# Patient Record
Sex: FEMALE | Race: ASIAN | Employment: OTHER | URBAN - METROPOLITAN AREA
[De-identification: names, ages, dates, MRNs, and addresses within clinical notes are randomized per-mention and may not be internally consistent; named-entity substitution may affect disease eponyms.]

---

## 2019-05-01 ENCOUNTER — APPOINTMENT (OUTPATIENT)
Dept: CT IMAGING | Age: 64
End: 2019-05-01
Attending: EMERGENCY MEDICINE
Payer: COMMERCIAL

## 2019-05-01 ENCOUNTER — HOSPITAL ENCOUNTER (EMERGENCY)
Age: 64
Discharge: HOME | End: 2019-05-01
Attending: EMERGENCY MEDICINE
Payer: COMMERCIAL

## 2019-05-01 VITALS
OXYGEN SATURATION: 90 % | SYSTOLIC BLOOD PRESSURE: 122 MMHG | TEMPERATURE: 98 F | RESPIRATION RATE: 20 BRPM | HEART RATE: 91 BPM | BODY MASS INDEX: 33.99 KG/M2 | HEIGHT: 61 IN | DIASTOLIC BLOOD PRESSURE: 74 MMHG | WEIGHT: 180 LBS

## 2019-05-01 DIAGNOSIS — R07.89 ATYPICAL CHEST PAIN: ICD-10-CM

## 2019-05-01 DIAGNOSIS — M54.16 LUMBAR RADICULOPATHY: Primary | ICD-10-CM

## 2019-05-01 LAB
ALBUMIN SERPL-MCNC: 3.9 G/DL (ref 3.4–5)
ALBUMIN/GLOB SERPL: 1.1 {RATIO} (ref 0.8–1.7)
ALP SERPL-CCNC: 90 U/L (ref 45–117)
ALT SERPL-CCNC: 26 U/L (ref 13–56)
ANION GAP SERPL CALC-SCNC: 9 MMOL/L (ref 3–18)
AST SERPL-CCNC: 18 U/L (ref 15–37)
BASOPHILS # BLD: 0.1 K/UL (ref 0–0.1)
BASOPHILS NFR BLD: 1 % (ref 0–2)
BILIRUB SERPL-MCNC: 0.3 MG/DL (ref 0.2–1)
BUN SERPL-MCNC: 21 MG/DL (ref 7–18)
BUN/CREAT SERPL: 23 (ref 12–20)
CALCIUM SERPL-MCNC: 9.2 MG/DL (ref 8.5–10.1)
CHLORIDE SERPL-SCNC: 105 MMOL/L (ref 100–108)
CK MB CFR SERPL CALC: 1.7 % (ref 0–4)
CK MB SERPL-MCNC: 1.8 NG/ML (ref 5–25)
CK SERPL-CCNC: 105 U/L (ref 26–192)
CO2 SERPL-SCNC: 25 MMOL/L (ref 21–32)
CREAT SERPL-MCNC: 0.91 MG/DL (ref 0.6–1.3)
DIFFERENTIAL METHOD BLD: ABNORMAL
EOSINOPHIL # BLD: 0.4 K/UL (ref 0–0.4)
EOSINOPHIL NFR BLD: 6 % (ref 0–5)
ERYTHROCYTE [DISTWIDTH] IN BLOOD BY AUTOMATED COUNT: 13.7 % (ref 11.6–14.5)
GLOBULIN SER CALC-MCNC: 3.4 G/DL (ref 2–4)
GLUCOSE SERPL-MCNC: 148 MG/DL (ref 74–99)
HCT VFR BLD AUTO: 39.4 % (ref 35–45)
HGB BLD-MCNC: 13.1 G/DL (ref 12–16)
LYMPHOCYTES # BLD: 1.6 K/UL (ref 0.9–3.6)
LYMPHOCYTES NFR BLD: 25 % (ref 21–52)
MCH RBC QN AUTO: 28 PG (ref 24–34)
MCHC RBC AUTO-ENTMCNC: 33.2 G/DL (ref 31–37)
MCV RBC AUTO: 84.2 FL (ref 74–97)
MONOCYTES # BLD: 0.4 K/UL (ref 0.05–1.2)
MONOCYTES NFR BLD: 7 % (ref 3–10)
NEUTS SEG # BLD: 3.9 K/UL (ref 1.8–8)
NEUTS SEG NFR BLD: 61 % (ref 40–73)
PLATELET # BLD AUTO: 329 K/UL (ref 135–420)
PMV BLD AUTO: 8.9 FL (ref 9.2–11.8)
POTASSIUM SERPL-SCNC: 4 MMOL/L (ref 3.5–5.5)
PROT SERPL-MCNC: 7.3 G/DL (ref 6.4–8.2)
RBC # BLD AUTO: 4.68 M/UL (ref 4.2–5.3)
SODIUM SERPL-SCNC: 139 MMOL/L (ref 136–145)
TROPONIN I SERPL-MCNC: <0.02 NG/ML (ref 0–0.04)
WBC # BLD AUTO: 6.3 K/UL (ref 4.6–13.2)

## 2019-05-01 PROCEDURE — 74011250636 HC RX REV CODE- 250/636: Performed by: EMERGENCY MEDICINE

## 2019-05-01 PROCEDURE — 85025 COMPLETE CBC W/AUTO DIFF WBC: CPT

## 2019-05-01 PROCEDURE — 93005 ELECTROCARDIOGRAM TRACING: CPT

## 2019-05-01 PROCEDURE — 99284 EMERGENCY DEPT VISIT MOD MDM: CPT

## 2019-05-01 PROCEDURE — 80053 COMPREHEN METABOLIC PANEL: CPT

## 2019-05-01 PROCEDURE — 96374 THER/PROPH/DIAG INJ IV PUSH: CPT

## 2019-05-01 PROCEDURE — 82550 ASSAY OF CK (CPK): CPT

## 2019-05-01 PROCEDURE — 72131 CT LUMBAR SPINE W/O DYE: CPT

## 2019-05-01 RX ORDER — KETOROLAC TROMETHAMINE 30 MG/ML
30 INJECTION, SOLUTION INTRAMUSCULAR; INTRAVENOUS
Status: COMPLETED | OUTPATIENT
Start: 2019-05-01 | End: 2019-05-01

## 2019-05-01 RX ORDER — CYCLOBENZAPRINE HCL 10 MG
10 TABLET ORAL
Qty: 20 TAB | Refills: 0 | Status: SHIPPED | OUTPATIENT
Start: 2019-05-01 | End: 2022-05-31

## 2019-05-01 RX ORDER — HYDROCODONE BITARTRATE AND ACETAMINOPHEN 7.5; 325 MG/1; MG/1
1 TABLET ORAL
Qty: 60 TAB | Refills: 0 | Status: SHIPPED | OUTPATIENT
Start: 2019-05-01 | End: 2019-05-16

## 2019-05-01 RX ADMIN — KETOROLAC TROMETHAMINE 30 MG: 30 INJECTION, SOLUTION INTRAMUSCULAR at 07:06

## 2019-05-01 NOTE — ED TRIAGE NOTES
Patient reports to ED c/c bilateral leg pain, reports pain radiates from knees to feet. Patient reports being seen by Dr. Juan Hernadez, reports medication is not working. Unable to recall name of pain medication, reports taking Vistaril to help with sleep but it didn't work.

## 2019-05-01 NOTE — DISCHARGE INSTRUCTIONS
Patient Education        Back Pain: Care Instructions  Your Care Instructions    Back pain has many possible causes. It is often related to problems with muscles and ligaments of the back. It may also be related to problems with the nerves, discs, or bones of the back. Moving, lifting, standing, sitting, or sleeping in an awkward way can strain the back. Sometimes you don't notice the injury until later. Arthritis is another common cause of back pain. Although it may hurt a lot, back pain usually improves on its own within several weeks. Most people recover in 12 weeks or less. Using good home treatment and being careful not to stress your back can help you feel better sooner. Follow-up care is a key part of your treatment and safety. Be sure to make and go to all appointments, and call your doctor if you are having problems. It's also a good idea to know your test results and keep a list of the medicines you take. How can you care for yourself at home? · Sit or lie in positions that are most comfortable and reduce your pain. Try one of these positions when you lie down:  ? Lie on your back with your knees bent and supported by large pillows. ? Lie on the floor with your legs on the seat of a sofa or chair. ? Lie on your side with your knees and hips bent and a pillow between your legs. ? Lie on your stomach if it does not make pain worse. · Do not sit up in bed, and avoid soft couches and twisted positions. Bed rest can help relieve pain at first, but it delays healing. Avoid bed rest after the first day of back pain. · Change positions every 30 minutes. If you must sit for long periods of time, take breaks from sitting. Get up and walk around, or lie in a comfortable position. · Try using a heating pad on a low or medium setting for 15 to 20 minutes every 2 or 3 hours. Try a warm shower in place of one session with the heating pad. · You can also try an ice pack for 10 to 15 minutes every 2 to 3 hours. Put a thin cloth between the ice pack and your skin. · Take pain medicines exactly as directed. ? If the doctor gave you a prescription medicine for pain, take it as prescribed. ? If you are not taking a prescription pain medicine, ask your doctor if you can take an over-the-counter medicine. · Take short walks several times a day. You can start with 5 to 10 minutes, 3 or 4 times a day, and work up to longer walks. Walk on level surfaces and avoid hills and stairs until your back is better. · Return to work and other activities as soon as you can. Continued rest without activity is usually not good for your back. · To prevent future back pain, do exercises to stretch and strengthen your back and stomach. Learn how to use good posture, safe lifting techniques, and proper body mechanics. When should you call for help? Call your doctor now or seek immediate medical care if:    · You have new or worsening numbness in your legs.     · You have new or worsening weakness in your legs. (This could make it hard to stand up.)     · You lose control of your bladder or bowels.    Watch closely for changes in your health, and be sure to contact your doctor if:    · You have a fever, lose weight, or don't feel well.     · You do not get better as expected. Where can you learn more? Go to http://kelsey-lexus.info/. Enter O250 in the search box to learn more about \"Back Pain: Care Instructions. \"  Current as of: September 20, 2018  Content Version: 11.9  © 8424-7285 [employer]. Care instructions adapted under license by [employer] (which disclaims liability or warranty for this information). If you have questions about a medical condition or this instruction, always ask your healthcare professional. [place] [address] any warranty or liability for your use of this information.

## 2019-05-01 NOTE — ED PROVIDER NOTES
EMERGENCY DEPARTMENT HISTORY AND PHYSICAL EXAM    Date: 5/1/2019  Patient Name: Zoë Hernandez    History of Presenting Illness     Chief Complaint   Patient presents with    Leg Pain         HPI:   6:52 AM  Zoë Hernandez is a 61 y.o. female with PMHX of diabetes, hypertension, asthma, coronary artery disease and she states she has a stent put in by Dr. Susy Farley 2 years ago who presents to the emergency department C/O multiple complaints but mostly leg pain bilaterally. Also complains of chest pain and wants his stents checked out. Patient states she has had long history of low back pain and leg pain and is treated for restless leg syndrome by her PCP. She states she has been on tramadol but this was changed   to gabapentin which she states is not working low back pain and leg pain began worsening about a month or 2 ago. Patient states she is now unable to sleep because of the pain. She states last night she began having chest pain as well. She points to the lower sternum area for hip pain which she says is localized and some shortness of breath. At present leg pain is worse and she rates 10 out of 10, and chest pain she rates a 6 out of 10. PCP: Tamar Panchal MD    Current Outpatient Medications   Medication Sig Dispense Refill    HYDROcodone-acetaminophen (NORCO) 7.5-325 mg per tablet Take 1 Tab by mouth every six (6) hours as needed for Pain for up to 15 days. Max Daily Amount: 4 Tabs. 60 Tab 0    cyclobenzaprine (FLEXERIL) 10 mg tablet Take 1 Tab by mouth three (3) times daily as needed for Muscle Spasm(s). 20 Tab 0    aspirin delayed-release 81 mg tablet Take 1 Tab by mouth daily. 30 Tab 0    ticagrelor (BRILINTA) 90 mg tablet Take 1 Tab by mouth two (2) times a day. 60 Tab 0    metFORMIN (GLUCOPHAGE) 500 mg tablet Take 500 mg by mouth daily (with breakfast).  lisinopril-hydrochlorothiazide (PRINZIDE, ZESTORETIC) 10-12.5 mg per tablet Take 1 Tab by mouth daily.       PARoxetine (PAXIL) 40 mg tablet Take 40 mg by mouth every evening.  cycloSPORINE (RESTASIS) 0.05 % ophthalmic emulsion Administer 1 Drop to both eyes every twelve (12) hours.  budesonide-formoterol (SYMBICORT) 80-4.5 mcg/actuation HFAA inhaler Take 2 Puffs by inhalation two (2) times a day.  albuterol (PROVENTIL HFA, VENTOLIN HFA, PROAIR HFA) 90 mcg/actuation inhaler Take 2 Puffs by inhalation every four (4) hours as needed for Wheezing.  vitamin E (AQUA GEMS) 400 unit capsule Take 400 Units by mouth daily.  vit a,c & e-lutein-minerals (OCUVITE) tablet 1 Tab daily.  potassium 99 mg tablet Take 99 mg by mouth daily. Past History     Past Medical History:  Past Medical History:   Diagnosis Date    Asthma     Diabetes ([facility] [address]     Hypertension     Psychiatric disorder     depression       Past Surgical History:  Past Surgical History:   Procedure Laterality Date    HX ORTHOPAEDIC      neck surgery    HX UROLOGICAL      bladder surgery       Family History:  History reviewed. No pertinent family history. Social History:  Social History     Tobacco Use    Smoking status: Never Smoker   Substance Use Topics    Alcohol use: No    Drug use: Not on file       Allergies: Allergies   Allergen Reactions    Statins-Hmg-Coa Reductase Inhibitors Other (comments)         Review of Systems   Review of Systems   Constitutional: Negative for chills and fever. HENT: Negative for congestion and sore throat. Respiratory: Negative for cough and shortness of breath. Cardiovascular: Positive for chest pain and palpitations. Gastrointestinal: Negative for abdominal distention, abdominal pain, nausea and vomiting. Genitourinary: Negative for difficulty urinating, dysuria, flank pain, frequency, hematuria, urgency, vaginal bleeding and vaginal discharge. Musculoskeletal: Positive for back pain. Negative for arthralgias and joint swelling. Leg pain   Skin: Negative for rash and wound.    Neurological: Negative for dizziness, weakness, light-headedness and headaches. Hematological: Negative for adenopathy. Physical Exam     Vitals:    05/01/19 0554 05/01/19 0819   BP: 119/70 122/74   Pulse: 98 91   Resp: 16 20   Temp: 98 °F (36.7 °C)    SpO2: 90%    Weight: 81.6 kg (180 lb)    Height: 5' 1\" (1.549 m)      Physical Exam   Constitutional: She is oriented to person, place, and time. She appears well-developed and well-nourished. No distress. Anxious female in no acute distress. HENT:   Head: Normocephalic and atraumatic. Right Ear: External ear normal. No swelling or tenderness. Tympanic membrane is not perforated, not erythematous and not bulging. Left Ear: External ear normal. No swelling or tenderness. Tympanic membrane is not perforated, not erythematous and not bulging. Nose: Nose normal. No mucosal edema or rhinorrhea. Right sinus exhibits no maxillary sinus tenderness and no frontal sinus tenderness. Left sinus exhibits no maxillary sinus tenderness and no frontal sinus tenderness. Mouth/Throat: Uvula is midline, oropharynx is clear and moist and mucous membranes are normal. No oral lesions. No trismus in the jaw. No dental abscesses or uvula swelling. No oropharyngeal exudate, posterior oropharyngeal edema, posterior oropharyngeal erythema or tonsillar abscesses. Eyes: Conjunctivae are normal. Right eye exhibits no discharge. Left eye exhibits no discharge. No scleral icterus. Neck: Normal range of motion. Neck supple. Cardiovascular: Normal rate, regular rhythm, normal heart sounds and intact distal pulses. Exam reveals no gallop and no friction rub. No murmur heard. Pulmonary/Chest: Effort normal and breath sounds normal. No accessory muscle usage. No tachypnea. No respiratory distress. She has no decreased breath sounds. She has no wheezes. She has no rhonchi. She has no rales. Abdominal: Soft. Musculoskeletal: Normal range of motion. She exhibits no edema or tenderness. There is some mild tenderness to the lumbar area at L5-S1 level. Straight leg raising is normal.   Lymphadenopathy:     She has no cervical adenopathy. Neurological: She is alert and oriented to person, place, and time. Skin: Skin is warm and dry. No rash noted. She is not diaphoretic. No erythema. Psychiatric: She has a normal mood and affect. Judgment normal.   Nursing note and vitals reviewed. Diagnostic Study Results     Labs -   No results found for this or any previous visit (from the past 12 hour(s)). Radiologic Studies -   CT SPINE LUMB WO CONT   Final Result   Impression:      1. Multilevel degenerative disc disease, most prominent at L3-L4 and L4-L5 where   there is moderate multifactorial canal stenosis. High-grade right lateral recess   effacement at L4-L5 with abutment of the descending right L5 nerve root. 2. Advanced lower lumbar facet arthropathy causes impingement of the right L4   and right L5 foraminal nerve roots. There is abutment but no definite   deformation of the left L4 foraminal nerve root. 3. No acute fracture or subluxation. 4. Only partially visualized are calcifications near the right kidney upper   pole. Indeterminant to what extent these are associated with the cortex or   adjacent vasculature. This does not appear to represent nephrolithiasis and   there is no acute obstructive uropathy. CT Results  (Last 48 hours)    None        CXR Results  (Last 48 hours)    None          Medications given in the ED-  Medications   ketorolac (TORADOL) injection 30 mg (30 mg IntraVENous Given 5/1/19 0706)         Medical Decision Making   I am the first provider for this patient. I reviewed the vital signs, available nursing notes, past medical history, past surgical history, family history and social history. Vital Signs-Reviewed the patient's vital signs.     Pulse Oximetry Analysis - 90% on RA     Cardiac Monitor:  Rate: 91 bpm  Rhythm: Sinus    EKG interpretation: (Preliminary)  6:52 AM   NSR with rate 94, Nl QRS      Records Reviewed: Nursing Notes and Old Medical Records    Provider Notes (Medical Decision Making):     Procedures:  Procedures    ED Course:   6:52 AM Initial assessment performed. The patients presenting problems have been discussed, and they are in agreement with the care plan formulated and outlined with them. I have encouraged them to ask questions as they arise throughout their visit. Diagnosis and Disposition       DISCHARGE NOTE:  08:09  Alber Pardo's  results have been reviewed with her. She has been counseled regarding her diagnosis, treatment, and plan. She verbally conveys understanding and agreement of the signs, symptoms, diagnosis, treatment and prognosis and additionally agrees to follow up as discussed. She also agrees with the care-plan and conveys that all of her questions have been answered. I have also provided discharge instructions for her that include: educational information regarding their diagnosis and treatment, and list of reasons why they would want to return to the ED prior to their follow-up appointment, should her condition change. She has been provided with education for proper emergency department utilization. CLINICAL IMPRESSION:    1. Lumbar radiculopathy    2. Atypical chest pain        PLAN:  1. D/C Home  2. Discharge Medication List as of 5/1/2019  8:10 AM      START taking these medications    Details   HYDROcodone-acetaminophen (NORCO) 7.5-325 mg per tablet Take 1 Tab by mouth every six (6) hours as needed for Pain for up to 15 days. Max Daily Amount: 4 Tabs., Print, Disp-60 Tab, R-0      cyclobenzaprine (FLEXERIL) 10 mg tablet Take 1 Tab by mouth three (3) times daily as needed for Muscle Spasm(s). , Print, Disp-20 Tab, R-0         CONTINUE these medications which have NOT CHANGED    Details   aspirin delayed-release 81 mg tablet Take 1 Tab by mouth daily. , Print, Disp-30 Tab, R-0 ticagrelor (BRILINTA) 90 mg tablet Take 1 Tab by mouth two (2) times a day., Print, Disp-60 Tab, R-0      metFORMIN (GLUCOPHAGE) 500 mg tablet Take 500 mg by mouth daily (with breakfast). , Historical Med      lisinopril-hydrochlorothiazide (PRINZIDE, ZESTORETIC) 10-12.5 mg per tablet Take 1 Tab by mouth daily. , Historical Med      PARoxetine (PAXIL) 40 mg tablet Take 40 mg by mouth every evening., Historical Med      cycloSPORINE (RESTASIS) 0.05 % ophthalmic emulsion Administer 1 Drop to both eyes every twelve (12) hours. , Historical Med      budesonide-formoterol (SYMBICORT) 80-4.5 mcg/actuation HFAA inhaler Take 2 Puffs by inhalation two (2) times a day., Historical Med      albuterol (PROVENTIL HFA, VENTOLIN HFA, PROAIR HFA) 90 mcg/actuation inhaler Take 2 Puffs by inhalation every four (4) hours as needed for Wheezing., Historical Med      vitamin E (AQUA GEMS) 400 unit capsule Take 400 Units by mouth daily. , Historical Med      vit a,c & e-lutein-minerals (OCUVITE) tablet 1 Tab daily. , Historical Med      potassium 99 mg tablet Take 99 mg by mouth daily. , Historical Med           3.    Follow-up Information     Follow up With Specialties Details Why Contact Info    Tomas Sanderson MD Cardiology, Internal Medicine In 1 day  [address]  [address] [facility]      Giancarlo Stubbs MD Orthopedic Surgery In 1 day  [address] [address]  [phone]      Other, MD Tamar  In 1 day  Patient can only remember the practice name and not the physician      [facility] Emergency Medicine  As needed [address] [address] [phone]

## 2019-05-06 ENCOUNTER — HOSPITAL ENCOUNTER (OUTPATIENT)
Dept: MRI IMAGING | Age: 64
Discharge: HOME | End: 2019-05-06
Attending: ORTHOPAEDIC SURGERY
Payer: COMMERCIAL

## 2019-05-06 DIAGNOSIS — M54.50 LOW BACK PAIN: ICD-10-CM

## 2019-05-06 PROCEDURE — 72148 MRI LUMBAR SPINE W/O DYE: CPT

## 2019-05-11 LAB
ATRIAL RATE: 94 BPM
CALCULATED P AXIS, ECG09: 42 DEGREES
CALCULATED R AXIS, ECG10: 42 DEGREES
CALCULATED T AXIS, ECG11: 36 DEGREES
DIAGNOSIS, 93000: NORMAL
P-R INTERVAL, ECG05: 162 MS
Q-T INTERVAL, ECG07: 380 MS
QRS DURATION, ECG06: 86 MS
QTC CALCULATION (BEZET), ECG08: 475 MS
VENTRICULAR RATE, ECG03: 94 BPM

## 2019-07-23 ENCOUNTER — HOSPITAL ENCOUNTER (OUTPATIENT)
Dept: MRI IMAGING | Age: 64
Discharge: HOME | End: 2019-07-23
Attending: ORTHOPAEDIC SURGERY
Payer: COMMERCIAL

## 2019-07-23 DIAGNOSIS — M25.562 LEFT KNEE PAIN: ICD-10-CM

## 2019-07-23 DIAGNOSIS — M25.561 KNEE PAIN, RIGHT: ICD-10-CM

## 2019-07-23 PROCEDURE — 73721 MRI JNT OF LWR EXTRE W/O DYE: CPT

## 2020-12-30 ENCOUNTER — HOSPITAL ENCOUNTER (EMERGENCY)
Age: 65
Discharge: HOME | End: 2020-12-30
Attending: EMERGENCY MEDICINE
Payer: MEDICARE

## 2020-12-30 VITALS
TEMPERATURE: 97.3 F | DIASTOLIC BLOOD PRESSURE: 63 MMHG | SYSTOLIC BLOOD PRESSURE: 130 MMHG | HEIGHT: 61 IN | HEART RATE: 100 BPM | OXYGEN SATURATION: 97 % | BODY MASS INDEX: 33.99 KG/M2 | RESPIRATION RATE: 18 BRPM | WEIGHT: 180 LBS

## 2020-12-30 DIAGNOSIS — K59.00 CONSTIPATION, UNSPECIFIED CONSTIPATION TYPE: ICD-10-CM

## 2020-12-30 DIAGNOSIS — G25.81 RLS (RESTLESS LEGS SYNDROME): Primary | ICD-10-CM

## 2020-12-30 PROCEDURE — 74011250637 HC RX REV CODE- 250/637: Performed by: EMERGENCY MEDICINE

## 2020-12-30 PROCEDURE — 99284 EMERGENCY DEPT VISIT MOD MDM: CPT

## 2020-12-30 RX ORDER — BROMPHENIRAMINE MALEATE, DEXTROMETHORPHAN HBR, PHENYLEPHRINE HCL, DIPHENHYDRAMINE HCL, PHENYLEPHRINE HCL 0.52G
1 KIT ORAL DAILY
Qty: 30 CAP | Refills: 0 | Status: SHIPPED | OUTPATIENT
Start: 2020-12-30 | End: 2021-01-29

## 2020-12-30 RX ORDER — DOCUSATE SODIUM 100 MG/1
100 CAPSULE, LIQUID FILLED ORAL 2 TIMES DAILY
Qty: 60 CAP | Refills: 0 | Status: SHIPPED | OUTPATIENT
Start: 2020-12-30 | End: 2021-01-29

## 2020-12-30 RX ORDER — ROPINIROLE 5 MG/1
5 TABLET, FILM COATED ORAL
Qty: 20 TAB | Refills: 0 | Status: SHIPPED | OUTPATIENT
Start: 2020-12-30 | End: 2022-05-24

## 2020-12-30 RX ORDER — ROPINIROLE 1 MG/1
5 TABLET, FILM COATED ORAL ONCE
Status: COMPLETED | OUTPATIENT
Start: 2020-12-30 | End: 2020-12-30

## 2020-12-30 RX ORDER — POLYETHYLENE GLYCOL 3350 17 G/17G
17 POWDER, FOR SOLUTION ORAL DAILY
Qty: 300 G | Refills: 0 | Status: SHIPPED | OUTPATIENT
Start: 2020-12-30

## 2020-12-30 RX ADMIN — ROPINIROLE HYDROCHLORIDE 5 MG: 1 TABLET, FILM COATED ORAL at 05:02

## 2020-12-30 NOTE — ED PROVIDER NOTES
EMERGENCY DEPARTMENT HISTORY AND PHYSICAL EXAM    Date: 12/30/2020  Patient Name: Vilma Mcgill    History of Presenting Illness     Chief Complaint   Patient presents with    Insomnia    Restless Leg Syndrome    Medication Refill         History Provided By: Patient    Mohan Velazquez is a 72 y.o. female with PMHX of restless leg syndrome, diabetes who presents to the emergency department C/O restless legs. Patient reports that she is out of her ropinirole which she has been taking for over 2 years for restless leg syndrome. She tells me that her doctor prescribed her clonazepam to help her sleep but did not prescribe her ropinirole. Tells me she thinks this is because he did not want her to be taking both at the same time. She reports feeling restless legs flaring up and not being able to sleep for the past 2 days. Patient also reports constipation and feeling bloated. PCP: Tamar Panchal MD    Current Facility-Administered Medications   Medication Dose Route Frequency Provider Last Rate Last Admin    rOPINIRole (REQUIP) tablet 5 mg  5 mg Oral ONCE Mikey Acuña MD         Current Outpatient Medications   Medication Sig Dispense Refill    rOPINIRole (Requip) 5 mg tablet Take 1 Tab by mouth nightly. 20 Tab 0    polyethylene glycol (Miralax) 17 gram/dose powder Take 17 g by mouth daily. 1 tablespoon with 8 oz of water daily 300 g 0    docusate sodium (COLACE) 100 mg capsule Take 1 Cap by mouth two (2) times a day for 30 days. 60 Cap 0    psyllium (Metamucil) 0.52 gram capsule Take 1 Cap by mouth daily for 30 days. 30 Cap 0    cyclobenzaprine (FLEXERIL) 10 mg tablet Take 1 Tab by mouth three (3) times daily as needed for Muscle Spasm(s). 20 Tab 0    aspirin delayed-release 81 mg tablet Take 1 Tab by mouth daily. 30 Tab 0    ticagrelor (BRILINTA) 90 mg tablet Take 1 Tab by mouth two (2) times a day. 60 Tab 0    metFORMIN (GLUCOPHAGE) 500 mg tablet Take 500 mg by mouth daily (with breakfast).  lisinopril-hydrochlorothiazide (PRINZIDE, ZESTORETIC) 10-12.5 mg per tablet Take 1 Tab by mouth daily.  PARoxetine (PAXIL) 40 mg tablet Take 40 mg by mouth every evening.  cycloSPORINE (RESTASIS) 0.05 % ophthalmic emulsion Administer 1 Drop to both eyes every twelve (12) hours.  budesonide-formoterol (SYMBICORT) 80-4.5 mcg/actuation HFAA inhaler Take 2 Puffs by inhalation two (2) times a day.  albuterol (PROVENTIL HFA, VENTOLIN HFA, PROAIR HFA) 90 mcg/actuation inhaler Take 2 Puffs by inhalation every four (4) hours as needed for Wheezing.  vitamin E (AQUA GEMS) 400 unit capsule Take 400 Units by mouth daily.  vit a,c & e-lutein-minerals (OCUVITE) tablet 1 Tab daily.  potassium 99 mg tablet Take 99 mg by mouth daily. Past History     Past Medical History:  Past Medical History:   Diagnosis Date    Asthma     Diabetes ([facility] [address]     Hypertension     Psychiatric disorder     depression       Past Surgical History:  Past Surgical History:   Procedure Laterality Date    HX ORTHOPAEDIC      neck surgery    HX UROLOGICAL      bladder surgery       Family History:  History reviewed. No pertinent family history. Social History:  Social History     Tobacco Use    Smoking status: Never Smoker   Substance Use Topics    Alcohol use: No    Drug use: Never       Allergies: Allergies   Allergen Reactions    Statins-Hmg-Coa Reductase Inhibitors Other (comments)         Review of Systems   Review of Systems   Constitutional: Negative for chills and fever. Respiratory: Negative for shortness of breath. Cardiovascular: Negative for chest pain. Gastrointestinal: Positive for constipation. Musculoskeletal: Positive for myalgias. All other systems reviewed and are negative.         Physical Exam     Vitals:    12/30/20 0430   BP: 130/63   Pulse: 100   Resp: 18   Temp: 97.3 °F (36.3 °C)   SpO2: 97%   Weight: 81.6 kg (180 lb)   Height: 5' 1\" (1.549 m)     Physical Exam  Vitals signs and nursing note reviewed. Constitutional:       General: She is not in acute distress. Appearance: Normal appearance. She is not ill-appearing. HENT:      Head: Normocephalic and atraumatic. Eyes:      Extraocular Movements: Extraocular movements intact. Conjunctiva/sclera: Conjunctivae normal.   Neck:      Musculoskeletal: Normal range of motion. Cardiovascular:      Rate and Rhythm: Normal rate and regular rhythm. Pulmonary:      Effort: Pulmonary effort is normal. No respiratory distress. Abdominal:      General: There is no distension. Palpations: Abdomen is soft. Tenderness: There is no abdominal tenderness. Musculoskeletal: Normal range of motion. General: No deformity. Right lower leg: No edema. Left lower leg: No edema. Neurological:      General: No focal deficit present. Mental Status: She is alert and oriented to person, place, and time. Mental status is at baseline. Comments: Akthesia, lower extremities    Psychiatric:         Mood and Affect: Mood normal.         Behavior: Behavior normal.      Comments: Restless, pacing room               Diagnostic Study Results     Labs -   No results found for this or any previous visit (from the past 12 hour(s)). Radiologic Studies -   No orders to display     CT Results  (Last 48 hours)    None        CXR Results  (Last 48 hours)    None          Medications given in the ED-  Medications   rOPINIRole (REQUIP) tablet 5 mg (has no administration in time range)         Medical Decision Making   I am the first provider for this patient. I reviewed the vital signs, available nursing notes, past medical history, past surgical history, family history and social history. Vital Signs-Reviewed the patient's vital signs.     Pulse Oximetry Analysis and Interpretation:   97% on RA, normal        Records Reviewed: Nursing Notes    Provider Notes (Medical Decision Making): Joao Claudio is a 72 y.o. female here for essentially medication refill not be able to sleep as she has not had her ropinirole and has had difficulty getting in contact with her primary care physician. Patient very restless and has been unable to sleep because of this. She tells me his medication has been working well for her for the past 2 years. Will give dose in emergency department and also prescribe short course until she can be seen by her primary care doctor. She is also reporting constipation. This is an ongoing issue. Will prescribed bowel regimen as well. Procedures:  Procedures    ED Course:       Diagnosis and Disposition     Critical Care:     DISCHARGE NOTE:    Alber Pardo's  results have been reviewed with her. She has been counseled regarding her diagnosis, treatment, and plan. She verbally conveys understanding and agreement of the signs, symptoms, diagnosis, treatment and prognosis and additionally agrees to follow up as discussed. She also agrees with the care-plan and conveys that all of her questions have been answered. I have also provided discharge instructions for her that include: educational information regarding their diagnosis and treatment, and list of reasons why they would want to return to the ED prior to their follow-up appointment, should her condition change. She has been provided with education for proper emergency department utilization. CLINICAL IMPRESSION:    1. RLS (restless legs syndrome)    2. Constipation, unspecified constipation type        PLAN:  1. D/C Home  2. Current Discharge Medication List      START taking these medications    Details   rOPINIRole (Requip) 5 mg tablet Take 1 Tab by mouth nightly. Qty: 20 Tab, Refills: 0      polyethylene glycol (Miralax) 17 gram/dose powder Take 17 g by mouth daily.  1 tablespoon with 8 oz of water daily  Qty: 300 g, Refills: 0      docusate sodium (COLACE) 100 mg capsule Take 1 Cap by mouth two (2) times a day for 30 days.  Qty: 60 Cap, Refills: 0      psyllium (Metamucil) 0.52 gram capsule Take 1 Cap by mouth daily for 30 days. Qty: 30 Cap, Refills: 0           3. Follow-up Information     Follow up With Specialties Details Why Contact Info    Mallie Dakins, MD Family Medicine Schedule an appointment as soon as possible for a visit  For primary care follow up [address] [place] [address]  [phone]          _______________________________      Please note that this dictation was completed with [employer], the computer voice recognition software. Quite often unanticipated grammatical, syntax, homophones, and other interpretive errors are inadvertently transcribed by the computer software. Please disregard these errors. Please excuse any errors that have escaped final proofreading.

## 2021-03-22 ENCOUNTER — HOSPITAL ENCOUNTER (EMERGENCY)
Age: 66
Discharge: HOME | End: 2021-03-22
Attending: EMERGENCY MEDICINE
Payer: MEDICARE

## 2021-03-22 ENCOUNTER — APPOINTMENT (OUTPATIENT)
Dept: GENERAL RADIOLOGY | Age: 66
End: 2021-03-22
Attending: PHYSICIAN ASSISTANT
Payer: MEDICARE

## 2021-03-22 VITALS
SYSTOLIC BLOOD PRESSURE: 127 MMHG | HEIGHT: 61 IN | TEMPERATURE: 97.6 F | DIASTOLIC BLOOD PRESSURE: 71 MMHG | HEART RATE: 99 BPM | RESPIRATION RATE: 20 BRPM | OXYGEN SATURATION: 93 % | BODY MASS INDEX: 32.1 KG/M2 | WEIGHT: 170 LBS

## 2021-03-22 DIAGNOSIS — R07.9 CHEST PAIN, UNSPECIFIED TYPE: Primary | ICD-10-CM

## 2021-03-22 LAB
ALBUMIN SERPL-MCNC: 4 G/DL (ref 3.4–5)
ALBUMIN/GLOB SERPL: 1.2 {RATIO} (ref 0.8–1.7)
ALP SERPL-CCNC: 87 U/L (ref 45–117)
ALT SERPL-CCNC: 25 U/L (ref 13–56)
ANION GAP SERPL CALC-SCNC: 6 MMOL/L (ref 3–18)
APPEARANCE UR: CLEAR
APTT PPP: 26.4 SEC (ref 23–36.4)
AST SERPL-CCNC: 15 U/L (ref 10–38)
BASOPHILS # BLD: 0 K/UL (ref 0–0.1)
BASOPHILS NFR BLD: 0 % (ref 0–2)
BILIRUB SERPL-MCNC: 0.5 MG/DL (ref 0.2–1)
BILIRUB UR QL: NEGATIVE
BNP SERPL-MCNC: 14 PG/ML (ref 0–900)
BUN SERPL-MCNC: 12 MG/DL (ref 7–18)
BUN/CREAT SERPL: 17 (ref 12–20)
CALCIUM SERPL-MCNC: 9.4 MG/DL (ref 8.5–10.1)
CHLORIDE SERPL-SCNC: 101 MMOL/L (ref 100–111)
CK MB CFR SERPL CALC: 1.5 % (ref 0–4)
CK MB CFR SERPL CALC: 1.9 % (ref 0–4)
CK MB SERPL-MCNC: 1 NG/ML (ref 5–25)
CK MB SERPL-MCNC: 1.1 NG/ML (ref 5–25)
CK SERPL-CCNC: 58 U/L (ref 26–192)
CK SERPL-CCNC: 66 U/L (ref 26–192)
CO2 SERPL-SCNC: 31 MMOL/L (ref 21–32)
COLOR UR: YELLOW
CREAT SERPL-MCNC: 0.7 MG/DL (ref 0.6–1.3)
D DIMER PPP FEU-MCNC: <0.27 UG/ML(FEU)
DIFFERENTIAL METHOD BLD: NORMAL
EOSINOPHIL # BLD: 0.1 K/UL (ref 0–0.4)
EOSINOPHIL NFR BLD: 2 % (ref 0–5)
ERYTHROCYTE [DISTWIDTH] IN BLOOD BY AUTOMATED COUNT: 13.8 % (ref 11.6–14.5)
GLOBULIN SER CALC-MCNC: 3.4 G/DL (ref 2–4)
GLUCOSE SERPL-MCNC: 115 MG/DL (ref 74–99)
GLUCOSE UR STRIP.AUTO-MCNC: NEGATIVE MG/DL
HCT VFR BLD AUTO: 41.1 % (ref 35–45)
HGB BLD-MCNC: 13 G/DL (ref 12–16)
HGB UR QL STRIP: NEGATIVE
INR PPP: 0.9 (ref 0.8–1.2)
KETONES UR QL STRIP.AUTO: NEGATIVE MG/DL
LEUKOCYTE ESTERASE UR QL STRIP.AUTO: NEGATIVE
LIPASE SERPL-CCNC: 146 U/L (ref 73–393)
LYMPHOCYTES # BLD: 1.4 K/UL (ref 0.9–3.6)
LYMPHOCYTES NFR BLD: 23 % (ref 21–52)
MCH RBC QN AUTO: 27.5 PG (ref 24–34)
MCHC RBC AUTO-ENTMCNC: 31.6 G/DL (ref 31–37)
MCV RBC AUTO: 86.9 FL (ref 74–97)
MONOCYTES # BLD: 0.3 K/UL (ref 0.05–1.2)
MONOCYTES NFR BLD: 5 % (ref 3–10)
NEUTS SEG # BLD: 4.2 K/UL (ref 1.8–8)
NEUTS SEG NFR BLD: 70 % (ref 40–73)
NITRITE UR QL STRIP.AUTO: NEGATIVE
PH UR STRIP: 7 [PH] (ref 5–8)
PLATELET # BLD AUTO: 308 K/UL (ref 135–420)
PMV BLD AUTO: 9.2 FL (ref 9.2–11.8)
POTASSIUM SERPL-SCNC: 3.8 MMOL/L (ref 3.5–5.5)
PROT SERPL-MCNC: 7.4 G/DL (ref 6.4–8.2)
PROT UR STRIP-MCNC: NEGATIVE MG/DL
PROTHROMBIN TIME: 12.2 SEC (ref 11.5–15.2)
RBC # BLD AUTO: 4.73 M/UL (ref 4.2–5.3)
SODIUM SERPL-SCNC: 138 MMOL/L (ref 136–145)
SP GR UR REFRACTOMETRY: <1.005 (ref 1–1.03)
TROPONIN I SERPL-MCNC: <0.02 NG/ML (ref 0–0.04)
TROPONIN I SERPL-MCNC: <0.02 NG/ML (ref 0–0.04)
UROBILINOGEN UR QL STRIP.AUTO: 0.2 EU/DL (ref 0.2–1)
WBC # BLD AUTO: 6.1 K/UL (ref 4.6–13.2)

## 2021-03-22 PROCEDURE — 85730 THROMBOPLASTIN TIME PARTIAL: CPT

## 2021-03-22 PROCEDURE — 83690 ASSAY OF LIPASE: CPT

## 2021-03-22 PROCEDURE — 99285 EMERGENCY DEPT VISIT HI MDM: CPT

## 2021-03-22 PROCEDURE — 80053 COMPREHEN METABOLIC PANEL: CPT

## 2021-03-22 PROCEDURE — 74011250637 HC RX REV CODE- 250/637: Performed by: PHYSICIAN ASSISTANT

## 2021-03-22 PROCEDURE — 81003 URINALYSIS AUTO W/O SCOPE: CPT

## 2021-03-22 PROCEDURE — 83880 ASSAY OF NATRIURETIC PEPTIDE: CPT

## 2021-03-22 PROCEDURE — 93005 ELECTROCARDIOGRAM TRACING: CPT

## 2021-03-22 PROCEDURE — 85379 FIBRIN DEGRADATION QUANT: CPT

## 2021-03-22 PROCEDURE — 85610 PROTHROMBIN TIME: CPT

## 2021-03-22 PROCEDURE — 85025 COMPLETE CBC W/AUTO DIFF WBC: CPT

## 2021-03-22 PROCEDURE — 82553 CREATINE MB FRACTION: CPT

## 2021-03-22 PROCEDURE — 71045 X-RAY EXAM CHEST 1 VIEW: CPT

## 2021-03-22 RX ORDER — GUAIFENESIN 100 MG/5ML
244 LIQUID (ML) ORAL
Status: COMPLETED | OUTPATIENT
Start: 2021-03-22 | End: 2021-03-22

## 2021-03-22 RX ADMIN — ASPIRIN 243 MG: 81 TABLET, CHEWABLE ORAL at 13:55

## 2021-03-22 RX ADMIN — NITROGLYCERIN 1 INCH: 20 OINTMENT TOPICAL at 13:56

## 2021-03-22 NOTE — ED PROVIDER NOTES
EMERGENCY DEPARTMENT HISTORY AND PHYSICAL EXAM    Date: 3/22/2021  Patient Name: Francia Solomon    History of Presenting Illness     Chief Complaint   Patient presents with    Shortness of Breath    Chest Pain         History Provided By: Patient    Chief Complaint: CP, SOB    HPI(Context):   1:02 PM  Francia Solomon is a 72 y.o. female with PMHX of CAD with stents x 2, HTN, DMII, asthma who presents to the emergency department C/O chest pain. Associated sxs include SOB. Sxs intermittent since last night. Reports 4/10 currently. Sxs are not exertional. Sxs described as pressure on across chest. No radiation of sxs. Pt reports left shoulder pain 2 days ago but since resolved. Pt followed by Nikole Cesar MD (cardiology). Pt was taken off Brilinta. Now takes 81 mg ASA with first dose this AM.  Pt is nonsmoker. Pt denies fever, chills, cough, wheezing, diaphoresis, nausea, vomiting, lightheadedness, dizziness, PND, and any other sxs or complaints. PCP: Ansley, MD Tamar    Current Outpatient Medications   Medication Sig Dispense Refill    rOPINIRole (Requip) 5 mg tablet Take 1 Tab by mouth nightly. 20 Tab 0    polyethylene glycol (Miralax) 17 gram/dose powder Take 17 g by mouth daily. 1 tablespoon with 8 oz of water daily 300 g 0    cyclobenzaprine (FLEXERIL) 10 mg tablet Take 1 Tab by mouth three (3) times daily as needed for Muscle Spasm(s). 20 Tab 0    aspirin delayed-release 81 mg tablet Take 1 Tab by mouth daily. 30 Tab 0    ticagrelor (BRILINTA) 90 mg tablet Take 1 Tab by mouth two (2) times a day. 60 Tab 0    metFORMIN (GLUCOPHAGE) 500 mg tablet Take 500 mg by mouth daily (with breakfast).  lisinopril-hydrochlorothiazide (PRINZIDE, ZESTORETIC) 10-12.5 mg per tablet Take 1 Tab by mouth daily.  PARoxetine (PAXIL) 40 mg tablet Take 40 mg by mouth every evening.  cycloSPORINE (RESTASIS) 0.05 % ophthalmic emulsion Administer 1 Drop to both eyes every twelve (12) hours.       budesonide-formoterol (SYMBICORT) 80-4.5 mcg/actuation HFAA inhaler Take 2 Puffs by inhalation two (2) times a day.  albuterol (PROVENTIL HFA, VENTOLIN HFA, PROAIR HFA) 90 mcg/actuation inhaler Take 2 Puffs by inhalation every four (4) hours as needed for Wheezing.  vitamin E (AQUA GEMS) 400 unit capsule Take 400 Units by mouth daily.  vit a,c & e-lutein-minerals (OCUVITE) tablet 1 Tab daily.  potassium 99 mg tablet Take 99 mg by mouth daily. Past History     Past Medical History:  Past Medical History:   Diagnosis Date    Asthma     Diabetes ([address]     Hypertension     Psychiatric disorder     depression       Past Surgical History:  Past Surgical History:   Procedure Laterality Date    HX ORTHOPAEDIC      neck surgery    HX UROLOGICAL      bladder surgery       Family History:  History reviewed. No pertinent family history. Social History:  Social History     Tobacco Use    Smoking status: Never Smoker    Smokeless tobacco: Never Used   Substance Use Topics    Alcohol use: No    Drug use: Never       Allergies: Allergies   Allergen Reactions    Statins-Hmg-Coa Reductase Inhibitors Other (comments)         Review of Systems   Review of Systems   Constitutional: Negative for chills, diaphoresis and fever. HENT: Negative for congestion. Respiratory: Positive for chest tightness and shortness of breath. Negative for cough. Cardiovascular: Positive for chest pain. Negative for palpitations and leg swelling. Gastrointestinal: Negative for nausea and vomiting. Musculoskeletal: Negative for back pain. Neurological: Negative for dizziness and light-headedness. All other systems reviewed and are negative. Physical Exam     Vitals:    03/22/21 1645 03/22/21 1700 03/22/21 1715 03/22/21 1719   BP:       Pulse: 89 94 93 99   Resp:       Temp:       SpO2: 95% 94% 95% 93%   Weight:       Height:         Physical Exam  Vitals signs and nursing note reviewed.    Constitutional:       General: She is not in acute distress. Appearance: She is well-developed. She is not diaphoretic. Comments:  female in NAD. Alert. Appears comfortable. HENT:      Head: Normocephalic and atraumatic. Right Ear: External ear normal.      Left Ear: External ear normal.      Nose: Nose normal.   Eyes:      General: No scleral icterus. Right eye: No discharge. Left eye: No discharge. Conjunctiva/sclera: Conjunctivae normal.   Neck:      Musculoskeletal: Normal range of motion. Cardiovascular:      Rate and Rhythm: Normal rate and regular rhythm. Heart sounds: Normal heart sounds. No murmur. No friction rub. No gallop. Pulmonary:      Effort: Pulmonary effort is normal. No tachypnea, accessory muscle usage or respiratory distress. Breath sounds: Normal breath sounds. No decreased breath sounds, wheezing, rhonchi or rales. Musculoskeletal: Normal range of motion. Right lower leg: No edema. Left lower leg: No edema. Skin:     General: Skin is warm and dry. Neurological:      Mental Status: She is alert and oriented to person, place, and time. Psychiatric:         Behavior: Behavior normal.         Judgment: Judgment normal.             Diagnostic Study Results     Labs -     No results found for this or any previous visit (from the past 12 hour(s)). XR CHEST PORT   Final Result      No acute radiographic cardiopulmonary abnormality. CT Results  (Last 48 hours)    None        CXR Results  (Last 48 hours)               03/22/21 1352  XR CHEST PORT Final result    Impression:      No acute radiographic cardiopulmonary abnormality. Narrative:  EXAM: XR CHEST PORT       CLINICAL INDICATION/HISTORY: CP, SOB   -Additional: None       COMPARISON: 4/13/2016       TECHNIQUE: Frontal view of the chest       _______________       FINDINGS:       HEART AND MEDIASTINUM: Normal cardiac size and mediastinal contours.        LUNGS AND PLEURAL SPACES: No focal pneumonic consolidation, pneumothorax, or   pleural effusion. BONY THORAX AND SOFT TISSUES: No acute osseous abnormality       _______________                 Medications given in the ED-  Medications   aspirin chewable tablet 243 mg (243 mg Oral Given 3/22/21 1355)   nitroglycerin (NITROBID) 2 % ointment 1 Inch (1 Inch Topical Given 3/22/21 1356)         Medical Decision Making   I am the first provider for this patient. I reviewed the vital signs, available nursing notes, past medical history, past surgical history, family history and social history. Vital Signs-Reviewed the patient's vital signs. Pulse Oximetry Analysis - 98% on RA. NORMAL      Records Reviewed: Nursing Notes, Old Medical Records, Previous electrocardiograms, Previous Radiology Studies and Previous Laboratory Studies    Provider Notes (Medical Decision Making): ACS/MI, PE, arrhythmia, pericarditis, myocarditis, GERD, COPD, CHF, PNA, asthma/RAD. Doubt dissection    Procedures:  Procedures    ED Course:   1:02 PM Initial assessment performed. The patients presenting problems have been discussed, and they are in agreement with the care plan formulated and outlined with them. I have encouraged them to ask questions as they arise throughout their visit. Diagnosis and Disposition       2:45 PM  Pt is CP free after nitro and ASA. Will await serial CE/EKG and consult cardiology    5:22 PM  Pt remains CP free. Serial EKG/CE unremarkable. Will consult cardiology    5:23 PM Consult: I discussed care with nurse for Dr. Stan Castillo as he is in a case. It was a standard discussion, including history of patients chief complaint, available diagnostic results, and treatment course. She will notify Dr. Stan Castillo and he will call back in 30 minutes    5:45 PM  Patient's presentation, labs/imaging and plan of care was reviewed with Margaret Jones PA-C as part of sign out.   They will consult with cardiology as part of the plan discussed with the patientKrys Schultz's assistance in completion of this plan is greatly appreciated but it should be noted that I will be the provider of record for this patient. Written by Uma Langley PA-C      5:58 PM Consult: Discussed case with Dr. Sergio Guzman cardiology. Agrees 2 sets of negative cardiac enzymes EKG x2 with NSR normal D-dimer remainder of labs stable vital signs and patient is pain-free at this time. Suitable for discharge and outpatient management. Patient will be seen in his office in a week likely to have outpatient stress testing and further recheck. NINA Jimenez       1. Chest pain, unspecified type        PLAN:  1. D/C Home  2. Discharge Medication List as of 3/22/2021  6:00 PM        3. Follow-up Information     Follow up With Specialties Details Why Eliezer Gitelman, MD Cardiology, Internal Medicine Schedule an appointment as soon as possible for a visit   [address] [place] [id number] [phone]      [facility] EMERGENCY DEPT Emergency Medicine  As needed, If symptoms worsen [address] [place] [id number]  [phone]        _______________________________    Attestations: This note is prepared by Uma Langley PA-C.  _______________________________          Please note that this dictation was completed with [employer], the computer voice recognition software. Quite often unanticipated grammatical, syntax, homophones, and other interpretive errors are inadvertently transcribed by the computer software. Please disregard these errors. Please excuse any errors that have escaped final proofreading.

## 2021-03-23 LAB
ATRIAL RATE: 87 BPM
ATRIAL RATE: 98 BPM
CALCULATED P AXIS, ECG09: 36 DEGREES
CALCULATED P AXIS, ECG09: 49 DEGREES
CALCULATED R AXIS, ECG10: 36 DEGREES
CALCULATED R AXIS, ECG10: 37 DEGREES
CALCULATED T AXIS, ECG11: 36 DEGREES
CALCULATED T AXIS, ECG11: 37 DEGREES
DIAGNOSIS, 93000: NORMAL
DIAGNOSIS, 93000: NORMAL
P-R INTERVAL, ECG05: 174 MS
P-R INTERVAL, ECG05: 184 MS
Q-T INTERVAL, ECG07: 382 MS
Q-T INTERVAL, ECG07: 394 MS
QRS DURATION, ECG06: 86 MS
QRS DURATION, ECG06: 86 MS
QTC CALCULATION (BEZET), ECG08: 474 MS
QTC CALCULATION (BEZET), ECG08: 487 MS
VENTRICULAR RATE, ECG03: 87 BPM
VENTRICULAR RATE, ECG03: 98 BPM

## 2022-05-03 ENCOUNTER — APPOINTMENT (OUTPATIENT)
Dept: GENERAL RADIOLOGY | Age: 67
DRG: 247 | End: 2022-05-03
Attending: EMERGENCY MEDICINE
Payer: MEDICARE

## 2022-05-03 ENCOUNTER — HOSPITAL ENCOUNTER (INPATIENT)
Age: 67
LOS: 2 days | Discharge: HOME | DRG: 247 | End: 2022-05-06
Attending: EMERGENCY MEDICINE | Admitting: FAMILY MEDICINE
Payer: MEDICARE

## 2022-05-03 DIAGNOSIS — I21.4 NON-ST ELEVATED MYOCARDIAL INFARCTION (HCC): Primary | ICD-10-CM

## 2022-05-03 DIAGNOSIS — I25.118 CORONARY ARTERY DISEASE INVOLVING NATIVE CORONARY ARTERY OF NATIVE HEART WITH OTHER FORM OF ANGINA PECTORIS (HCC): ICD-10-CM

## 2022-05-03 DIAGNOSIS — I21.4 NSTEMI (NON-ST ELEVATED MYOCARDIAL INFARCTION) (HCC): ICD-10-CM

## 2022-05-03 LAB
ALBUMIN SERPL-MCNC: 3.4 G/DL (ref 3.4–5)
ALBUMIN/GLOB SERPL: 1 {RATIO} (ref 0.8–1.7)
ALP SERPL-CCNC: 92 U/L (ref 45–117)
ALT SERPL-CCNC: 69 U/L (ref 13–56)
ANION GAP SERPL CALC-SCNC: 4 MMOL/L (ref 3–18)
AST SERPL-CCNC: 73 U/L (ref 10–38)
ATRIAL RATE: 102 BPM
BASOPHILS # BLD: 0.1 K/UL (ref 0–0.1)
BASOPHILS NFR BLD: 1 % (ref 0–2)
BILIRUB SERPL-MCNC: 0.3 MG/DL (ref 0.2–1)
BUN SERPL-MCNC: 20 MG/DL (ref 7–18)
BUN/CREAT SERPL: 23 (ref 12–20)
CALCIUM SERPL-MCNC: 8.6 MG/DL (ref 8.5–10.1)
CALCULATED P AXIS, ECG09: 41 DEGREES
CALCULATED R AXIS, ECG10: 44 DEGREES
CALCULATED T AXIS, ECG11: 23 DEGREES
CHLORIDE SERPL-SCNC: 109 MMOL/L (ref 100–111)
CO2 SERPL-SCNC: 27 MMOL/L (ref 21–32)
CREAT SERPL-MCNC: 0.87 MG/DL (ref 0.6–1.3)
D DIMER PPP FEU-MCNC: 0.38 UG/ML(FEU)
DIAGNOSIS, 93000: NORMAL
DIFFERENTIAL METHOD BLD: NORMAL
EOSINOPHIL # BLD: 0.1 K/UL (ref 0–0.4)
EOSINOPHIL NFR BLD: 2 % (ref 0–5)
ERYTHROCYTE [DISTWIDTH] IN BLOOD BY AUTOMATED COUNT: 13.9 % (ref 11.6–14.5)
GLOBULIN SER CALC-MCNC: 3.4 G/DL (ref 2–4)
GLUCOSE SERPL-MCNC: 178 MG/DL (ref 74–99)
HCT VFR BLD AUTO: 38.2 % (ref 35–45)
HGB BLD-MCNC: 12.3 G/DL (ref 12–16)
IMM GRANULOCYTES # BLD AUTO: 0 K/UL (ref 0–0.04)
IMM GRANULOCYTES NFR BLD AUTO: 0 % (ref 0–0.5)
LYMPHOCYTES # BLD: 1.5 K/UL (ref 0.9–3.6)
LYMPHOCYTES NFR BLD: 24 % (ref 21–52)
MAGNESIUM SERPL-MCNC: 2.1 MG/DL (ref 1.6–2.6)
MCH RBC QN AUTO: 28 PG (ref 24–34)
MCHC RBC AUTO-ENTMCNC: 32.2 G/DL (ref 31–37)
MCV RBC AUTO: 87 FL (ref 78–100)
MONOCYTES # BLD: 0.5 K/UL (ref 0.05–1.2)
MONOCYTES NFR BLD: 7 % (ref 3–10)
NEUTS SEG # BLD: 4.2 K/UL (ref 1.8–8)
NEUTS SEG NFR BLD: 66 % (ref 40–73)
NRBC # BLD: 0 K/UL (ref 0–0.01)
NRBC BLD-RTO: 0 PER 100 WBC
P-R INTERVAL, ECG05: 174 MS
PLATELET # BLD AUTO: 272 K/UL (ref 135–420)
PMV BLD AUTO: 9.2 FL (ref 9.2–11.8)
POTASSIUM SERPL-SCNC: 4.7 MMOL/L (ref 3.5–5.5)
PROT SERPL-MCNC: 6.8 G/DL (ref 6.4–8.2)
Q-T INTERVAL, ECG07: 354 MS
QRS DURATION, ECG06: 86 MS
QTC CALCULATION (BEZET), ECG08: 461 MS
RBC # BLD AUTO: 4.39 M/UL (ref 4.2–5.3)
SODIUM SERPL-SCNC: 140 MMOL/L (ref 136–145)
TROPONIN-HIGH SENSITIVITY: 48 NG/L (ref 0–54)
VENTRICULAR RATE, ECG03: 102 BPM
WBC # BLD AUTO: 6.4 K/UL (ref 4.6–13.2)

## 2022-05-03 PROCEDURE — 93005 ELECTROCARDIOGRAM TRACING: CPT

## 2022-05-03 PROCEDURE — 85730 THROMBOPLASTIN TIME PARTIAL: CPT

## 2022-05-03 PROCEDURE — 80053 COMPREHEN METABOLIC PANEL: CPT

## 2022-05-03 PROCEDURE — 84484 ASSAY OF TROPONIN QUANT: CPT

## 2022-05-03 PROCEDURE — 85379 FIBRIN DEGRADATION QUANT: CPT

## 2022-05-03 PROCEDURE — 71045 X-RAY EXAM CHEST 1 VIEW: CPT

## 2022-05-03 PROCEDURE — 74011250637 HC RX REV CODE- 250/637: Performed by: EMERGENCY MEDICINE

## 2022-05-03 PROCEDURE — 85025 COMPLETE CBC W/AUTO DIFF WBC: CPT

## 2022-05-03 PROCEDURE — 85610 PROTHROMBIN TIME: CPT

## 2022-05-03 PROCEDURE — 94762 N-INVAS EAR/PLS OXIMTRY CONT: CPT

## 2022-05-03 PROCEDURE — 83735 ASSAY OF MAGNESIUM: CPT

## 2022-05-03 PROCEDURE — 99285 EMERGENCY DEPT VISIT HI MDM: CPT

## 2022-05-03 RX ORDER — GUAIFENESIN 100 MG/5ML
162 LIQUID (ML) ORAL
Status: COMPLETED | OUTPATIENT
Start: 2022-05-03 | End: 2022-05-03

## 2022-05-03 RX ADMIN — ASPIRIN 162 MG: 81 TABLET, CHEWABLE ORAL at 21:01

## 2022-05-03 NOTE — Clinical Note
Contrast Dose Calculator:   Patient's age: 77.   Patient's sex: Female. Patient weight (kg) = 91.7. Creatinine level (mg/dL) = 0.73. Creatinine clearance (mL/min): 109.74. Max Contrast dose per Creatinine Cl calculator = 246.91 mL.

## 2022-05-03 NOTE — Clinical Note
TRANSFER - OUT REPORT:     Verbal report given to: RN in holding. Report consisted of patient's Situation, Background, Assessment and   Recommendations(SBAR). Opportunity for questions and clarification was provided. Patient transported with a Registered Nurse.

## 2022-05-03 NOTE — Clinical Note
TRANSFER - IN REPORT:     Verbal report received from: Care Unit RN. Report consisted of patient's Situation, Background, Assessment and   Recommendations(SBAR). Opportunity for questions and clarification was provided. Assessment completed upon patient's arrival to unit and care assumed. Patient transported with a Registered Nurse.

## 2022-05-03 NOTE — Clinical Note
Status[de-identified] INPATIENT [101]   Type of Bed: Telemetry [19]   Cardiac Monitoring Required?: Yes   Inpatient Hospitalization Certified Necessary for the Following Reasons: 3.  Patient receiving treatment that can only be provided in an inpatient setting (further clarification in H&P documentation)   Admitting Diagnosis: Non-STEMI (non-ST elevated myocardial infarction) [facility]) [[id number]]   Admitting Physician: Chano Ramirez [[id number]]   Attending Physician: Chano Ramirez [[id number]]   Estimated Length of Stay: 2 Midnights   Discharge Plan[de-identified] Home with Office Follow-up

## 2022-05-04 ENCOUNTER — APPOINTMENT (OUTPATIENT)
Dept: NON INVASIVE DIAGNOSTICS | Age: 67
DRG: 247 | End: 2022-05-04
Attending: FAMILY MEDICINE
Payer: MEDICARE

## 2022-05-04 PROBLEM — I10 HTN (HYPERTENSION): Status: ACTIVE | Noted: 2022-05-04

## 2022-05-04 PROBLEM — I21.4 NON-STEMI (NON-ST ELEVATED MYOCARDIAL INFARCTION) (HCC): Status: ACTIVE | Noted: 2022-05-04

## 2022-05-04 PROBLEM — Z95.5 HX OF HEART ARTERY STENT: Status: ACTIVE | Noted: 2022-05-04

## 2022-05-04 LAB
ALBUMIN SERPL-MCNC: 3.3 G/DL (ref 3.4–5)
ALBUMIN/GLOB SERPL: 0.9 {RATIO} (ref 0.8–1.7)
ALP SERPL-CCNC: 79 U/L (ref 45–117)
ALT SERPL-CCNC: 62 U/L (ref 13–56)
ANION GAP SERPL CALC-SCNC: 5 MMOL/L (ref 3–18)
APTT PPP: 111.2 SEC (ref 23–36.4)
APTT PPP: 26.9 SEC (ref 23–36.4)
APTT PPP: 88.4 SEC (ref 23–36.4)
APTT PPP: 92.6 SEC (ref 23–36.4)
APTT PPP: >180 SEC (ref 23–36.4)
AST SERPL-CCNC: 41 U/L (ref 10–38)
BILIRUB DIRECT SERPL-MCNC: 0.1 MG/DL (ref 0–0.2)
BILIRUB SERPL-MCNC: 0.3 MG/DL (ref 0.2–1)
BUN SERPL-MCNC: 17 MG/DL (ref 7–18)
BUN/CREAT SERPL: 22 (ref 12–20)
CALCIUM SERPL-MCNC: 8.4 MG/DL (ref 8.5–10.1)
CHLORIDE SERPL-SCNC: 111 MMOL/L (ref 100–111)
CHOLEST SERPL-MCNC: 205 MG/DL
CO2 SERPL-SCNC: 26 MMOL/L (ref 21–32)
CREAT SERPL-MCNC: 0.79 MG/DL (ref 0.6–1.3)
ECHO AO ROOT DIAM: 3.1 CM
ECHO AO ROOT INDEX: 1.69 CM/M2
ECHO AV AREA PEAK VELOCITY: 1.5 CM2
ECHO AV AREA VTI: 1.6 CM2
ECHO AV AREA/BSA PEAK VELOCITY: 0.8 CM2/M2
ECHO AV AREA/BSA VTI: 0.9 CM2/M2
ECHO AV MEAN GRADIENT: 5 MMHG
ECHO AV MEAN VELOCITY: 1 M/S
ECHO AV PEAK GRADIENT: 9 MMHG
ECHO AV PEAK VELOCITY: 1.5 M/S
ECHO AV VELOCITY RATIO: 0.47
ECHO AV VTI: 37 CM
ECHO LA VOL 2C: 34 ML (ref 22–52)
ECHO LA VOL 4C: 57 ML (ref 22–52)
ECHO LA VOL BP: 46 ML (ref 22–52)
ECHO LA VOL/BSA BIPLANE: 25 ML/M2 (ref 16–34)
ECHO LA VOLUME AREA LENGTH: 49 ML
ECHO LA VOLUME INDEX A2C: 19 ML/M2 (ref 16–34)
ECHO LA VOLUME INDEX A4C: 31 ML/M2 (ref 16–34)
ECHO LA VOLUME INDEX AREA LENGTH: 27 ML/M2 (ref 16–34)
ECHO LV E' LATERAL VELOCITY: 8 CM/S
ECHO LV E' SEPTAL VELOCITY: 8 CM/S
ECHO LV EDV A2C: 52 ML
ECHO LV EDV A4C: 73 ML
ECHO LV EDV BP: 64 ML (ref 56–104)
ECHO LV EDV INDEX A4C: 40 ML/M2
ECHO LV EDV INDEX BP: 35 ML/M2
ECHO LV EDV NDEX A2C: 28 ML/M2
ECHO LV EJECTION FRACTION A2C: 61 %
ECHO LV EJECTION FRACTION A4C: 64 %
ECHO LV EJECTION FRACTION BIPLANE: 63 % (ref 55–100)
ECHO LV ESV A2C: 20 ML
ECHO LV ESV A4C: 26 ML
ECHO LV ESV BP: 24 ML (ref 19–49)
ECHO LV ESV INDEX A2C: 11 ML/M2
ECHO LV ESV INDEX A4C: 14 ML/M2
ECHO LV ESV INDEX BP: 13 ML/M2
ECHO LV FRACTIONAL SHORTENING: 37 % (ref 28–44)
ECHO LV INTERNAL DIMENSION DIASTOLE INDEX: 2.24 CM/M2
ECHO LV INTERNAL DIMENSION DIASTOLIC: 4.1 CM (ref 3.9–5.3)
ECHO LV INTERNAL DIMENSION SYSTOLIC INDEX: 1.42 CM/M2
ECHO LV INTERNAL DIMENSION SYSTOLIC: 2.6 CM
ECHO LV IVSD: 1.3 CM (ref 0.6–0.9)
ECHO LV MASS 2D: 182.5 G (ref 67–162)
ECHO LV MASS INDEX 2D: 99.7 G/M2 (ref 43–95)
ECHO LV POSTERIOR WALL DIASTOLIC: 1.2 CM (ref 0.6–0.9)
ECHO LV RELATIVE WALL THICKNESS RATIO: 0.59
ECHO LVOT AREA: 3.1 CM2
ECHO LVOT AV VTI INDEX: 0.53
ECHO LVOT DIAM: 2 CM
ECHO LVOT MEAN GRADIENT: 1 MMHG
ECHO LVOT PEAK GRADIENT: 2 MMHG
ECHO LVOT PEAK VELOCITY: 0.7 M/S
ECHO LVOT STROKE VOLUME INDEX: 33.8 ML/M2
ECHO LVOT SV: 61.9 ML
ECHO LVOT VTI: 19.7 CM
ECHO MV A VELOCITY: 0.76 M/S
ECHO MV E DECELERATION TIME (DT): 124.5 MS
ECHO MV E VELOCITY: 0.71 M/S
ECHO MV E/A RATIO: 0.93
ECHO MV E/E' LATERAL: 8.88
ECHO MV E/E' RATIO (AVERAGED): 8.88
ECHO MV E/E' SEPTAL: 8.88
ECHO RV TAPSE: 2 CM (ref 1.7–?)
ECHO TV REGURGITANT MAX VELOCITY: 2.76 M/S
ECHO TV REGURGITANT PEAK GRADIENT: 31 MMHG
ERYTHROCYTE [DISTWIDTH] IN BLOOD BY AUTOMATED COUNT: 13.8 % (ref 11.6–14.5)
EST. AVERAGE GLUCOSE BLD GHB EST-MCNC: 174 MG/DL
GLOBULIN SER CALC-MCNC: 3.6 G/DL (ref 2–4)
GLUCOSE BLD STRIP.AUTO-MCNC: 118 MG/DL (ref 70–110)
GLUCOSE BLD STRIP.AUTO-MCNC: 128 MG/DL (ref 70–110)
GLUCOSE BLD STRIP.AUTO-MCNC: 136 MG/DL (ref 70–110)
GLUCOSE SERPL-MCNC: 147 MG/DL (ref 74–99)
HBA1C MFR BLD: 7.7 % (ref 4.2–5.6)
HCT VFR BLD AUTO: 38.6 % (ref 35–45)
HDLC SERPL-MCNC: 57 MG/DL (ref 40–60)
HDLC SERPL: 3.6 {RATIO} (ref 0–5)
HGB BLD-MCNC: 12.2 G/DL (ref 12–16)
INR PPP: 0.9 (ref 0.8–1.2)
INR PPP: 1 (ref 0.8–1.2)
LDLC SERPL CALC-MCNC: 123.6 MG/DL (ref 0–100)
LIPID PROFILE,FLP: ABNORMAL
MCH RBC QN AUTO: 27.7 PG (ref 24–34)
MCHC RBC AUTO-ENTMCNC: 31.6 G/DL (ref 31–37)
MCV RBC AUTO: 87.7 FL (ref 78–100)
NRBC # BLD: 0 K/UL (ref 0–0.01)
NRBC BLD-RTO: 0 PER 100 WBC
PLATELET # BLD AUTO: 290 K/UL (ref 135–420)
PMV BLD AUTO: 9.3 FL (ref 9.2–11.8)
POTASSIUM SERPL-SCNC: 3.9 MMOL/L (ref 3.5–5.5)
PROT SERPL-MCNC: 6.9 G/DL (ref 6.4–8.2)
PROTHROMBIN TIME: 12.3 SEC (ref 11.5–15.2)
PROTHROMBIN TIME: 13.5 SEC (ref 11.5–15.2)
RBC # BLD AUTO: 4.4 M/UL (ref 4.2–5.3)
SODIUM SERPL-SCNC: 142 MMOL/L (ref 136–145)
TRIGL SERPL-MCNC: 122 MG/DL (ref ?–150)
TROPONIN-HIGH SENSITIVITY: 60 NG/L (ref 0–54)
TROPONIN-HIGH SENSITIVITY: 68 NG/L (ref 0–54)
TROPONIN-HIGH SENSITIVITY: 68 NG/L (ref 0–54)
TSH SERPL DL<=0.05 MIU/L-ACNC: 3.7 UIU/ML (ref 0.36–3.74)
VLDLC SERPL CALC-MCNC: 24.4 MG/DL
WBC # BLD AUTO: 6 K/UL (ref 4.6–13.2)

## 2022-05-04 PROCEDURE — 82962 GLUCOSE BLOOD TEST: CPT

## 2022-05-04 PROCEDURE — 84443 ASSAY THYROID STIM HORMONE: CPT

## 2022-05-04 PROCEDURE — 65270000046 HC RM TELEMETRY

## 2022-05-04 PROCEDURE — 74011250636 HC RX REV CODE- 250/636: Performed by: FAMILY MEDICINE

## 2022-05-04 PROCEDURE — 93306 TTE W/DOPPLER COMPLETE: CPT

## 2022-05-04 PROCEDURE — 85610 PROTHROMBIN TIME: CPT

## 2022-05-04 PROCEDURE — 80061 LIPID PANEL: CPT

## 2022-05-04 PROCEDURE — 74011000250 HC RX REV CODE- 250: Performed by: FAMILY MEDICINE

## 2022-05-04 PROCEDURE — 94640 AIRWAY INHALATION TREATMENT: CPT

## 2022-05-04 PROCEDURE — 85730 THROMBOPLASTIN TIME PARTIAL: CPT

## 2022-05-04 PROCEDURE — 85027 COMPLETE CBC AUTOMATED: CPT

## 2022-05-04 PROCEDURE — 83036 HEMOGLOBIN GLYCOSYLATED A1C: CPT

## 2022-05-04 PROCEDURE — 74011250637 HC RX REV CODE- 250/637: Performed by: FAMILY MEDICINE

## 2022-05-04 PROCEDURE — 80048 BASIC METABOLIC PNL TOTAL CA: CPT

## 2022-05-04 PROCEDURE — 84484 ASSAY OF TROPONIN QUANT: CPT

## 2022-05-04 PROCEDURE — 74011250637 HC RX REV CODE- 250/637: Performed by: EMERGENCY MEDICINE

## 2022-05-04 PROCEDURE — 80076 HEPATIC FUNCTION PANEL: CPT

## 2022-05-04 PROCEDURE — 36415 COLL VENOUS BLD VENIPUNCTURE: CPT

## 2022-05-04 PROCEDURE — 74011250636 HC RX REV CODE- 250/636: Performed by: EMERGENCY MEDICINE

## 2022-05-04 RX ORDER — NITROGLYCERIN 0.4 MG/1
0.4 TABLET SUBLINGUAL
Status: DISCONTINUED | OUTPATIENT
Start: 2022-05-04 | End: 2022-05-06 | Stop reason: HOSPADM

## 2022-05-04 RX ORDER — SODIUM CHLORIDE 0.9 % (FLUSH) 0.9 %
5-40 SYRINGE (ML) INJECTION EVERY 8 HOURS
Status: DISCONTINUED | OUTPATIENT
Start: 2022-05-04 | End: 2022-05-04

## 2022-05-04 RX ORDER — ACETAMINOPHEN 325 MG/1
650 TABLET ORAL
Status: DISCONTINUED | OUTPATIENT
Start: 2022-05-04 | End: 2022-05-06 | Stop reason: HOSPADM

## 2022-05-04 RX ORDER — DEXTROSE MONOHYDRATE 100 MG/ML
0-250 INJECTION, SOLUTION INTRAVENOUS AS NEEDED
Status: DISCONTINUED | OUTPATIENT
Start: 2022-05-04 | End: 2022-05-06 | Stop reason: HOSPADM

## 2022-05-04 RX ORDER — ROPINIROLE 1 MG/1
5 TABLET, FILM COATED ORAL
Status: DISCONTINUED | OUTPATIENT
Start: 2022-05-04 | End: 2022-05-06 | Stop reason: HOSPADM

## 2022-05-04 RX ORDER — HYDROCODONE BITARTRATE AND ACETAMINOPHEN 5; 325 MG/1; MG/1
1 TABLET ORAL
Status: DISCONTINUED | OUTPATIENT
Start: 2022-05-04 | End: 2022-05-06 | Stop reason: HOSPADM

## 2022-05-04 RX ORDER — SODIUM CHLORIDE 0.9 % (FLUSH) 0.9 %
5-40 SYRINGE (ML) INJECTION AS NEEDED
Status: DISCONTINUED | OUTPATIENT
Start: 2022-05-04 | End: 2022-05-04

## 2022-05-04 RX ORDER — DOCUSATE SODIUM 100 MG/1
100 CAPSULE, LIQUID FILLED ORAL 2 TIMES DAILY
Status: DISCONTINUED | OUTPATIENT
Start: 2022-05-04 | End: 2022-05-06 | Stop reason: HOSPADM

## 2022-05-04 RX ORDER — PAROXETINE HYDROCHLORIDE 20 MG/1
40 TABLET, FILM COATED ORAL EVERY EVENING
Status: DISCONTINUED | OUTPATIENT
Start: 2022-05-04 | End: 2022-05-06 | Stop reason: HOSPADM

## 2022-05-04 RX ORDER — LEVOTHYROXINE SODIUM 25 UG/1
25 TABLET ORAL
COMMUNITY
Start: 2020-02-25

## 2022-05-04 RX ORDER — ONDANSETRON 2 MG/ML
4 INJECTION INTRAMUSCULAR; INTRAVENOUS
Status: DISCONTINUED | OUTPATIENT
Start: 2022-05-04 | End: 2022-05-06 | Stop reason: HOSPADM

## 2022-05-04 RX ORDER — METOPROLOL TARTRATE 25 MG/1
12.5 TABLET, FILM COATED ORAL EVERY 12 HOURS
Status: DISCONTINUED | OUTPATIENT
Start: 2022-05-04 | End: 2022-05-06 | Stop reason: HOSPADM

## 2022-05-04 RX ORDER — GUAIFENESIN 100 MG/5ML
81 LIQUID (ML) ORAL DAILY
Status: DISCONTINUED | OUTPATIENT
Start: 2022-05-05 | End: 2022-05-04

## 2022-05-04 RX ORDER — LANOLIN ALCOHOL/MO/W.PET/CERES
3 CREAM (GRAM) TOPICAL
Status: DISCONTINUED | OUTPATIENT
Start: 2022-05-04 | End: 2022-05-06 | Stop reason: HOSPADM

## 2022-05-04 RX ORDER — LEVOTHYROXINE SODIUM 25 UG/1
25 TABLET ORAL
Status: DISCONTINUED | OUTPATIENT
Start: 2022-05-05 | End: 2022-05-04 | Stop reason: SDUPTHER

## 2022-05-04 RX ORDER — METOPROLOL TARTRATE 25 MG/1
12.5 TABLET, FILM COATED ORAL
Status: COMPLETED | OUTPATIENT
Start: 2022-05-04 | End: 2022-05-04

## 2022-05-04 RX ORDER — INSULIN LISPRO 100 [IU]/ML
INJECTION, SOLUTION INTRAVENOUS; SUBCUTANEOUS
Status: DISCONTINUED | OUTPATIENT
Start: 2022-05-04 | End: 2022-05-06 | Stop reason: HOSPADM

## 2022-05-04 RX ORDER — MAGNESIUM SULFATE 100 %
4 CRYSTALS MISCELLANEOUS AS NEEDED
Status: DISCONTINUED | OUTPATIENT
Start: 2022-05-04 | End: 2022-05-06 | Stop reason: HOSPADM

## 2022-05-04 RX ORDER — MORPHINE SULFATE 2 MG/ML
2 INJECTION, SOLUTION INTRAMUSCULAR; INTRAVENOUS
Status: DISCONTINUED | OUTPATIENT
Start: 2022-05-04 | End: 2022-05-06 | Stop reason: HOSPADM

## 2022-05-04 RX ORDER — ASPIRIN 81 MG/1
81 TABLET ORAL DAILY
Status: DISCONTINUED | OUTPATIENT
Start: 2022-05-05 | End: 2022-05-06 | Stop reason: HOSPADM

## 2022-05-04 RX ORDER — LEVOTHYROXINE SODIUM 25 UG/1
25 TABLET ORAL
Status: DISCONTINUED | OUTPATIENT
Start: 2022-05-04 | End: 2022-05-06 | Stop reason: HOSPADM

## 2022-05-04 RX ORDER — ADHESIVE BANDAGE
30 BANDAGE TOPICAL DAILY PRN
Status: DISCONTINUED | OUTPATIENT
Start: 2022-05-04 | End: 2022-05-06 | Stop reason: HOSPADM

## 2022-05-04 RX ORDER — HEPARIN SODIUM 1000 [USP'U]/ML
80 INJECTION, SOLUTION INTRAVENOUS; SUBCUTANEOUS ONCE
Status: COMPLETED | OUTPATIENT
Start: 2022-05-04 | End: 2022-05-04

## 2022-05-04 RX ADMIN — MORPHINE SULFATE 2 MG: 2 INJECTION, SOLUTION INTRAMUSCULAR; INTRAVENOUS at 10:14

## 2022-05-04 RX ADMIN — MORPHINE SULFATE 2 MG: 2 INJECTION, SOLUTION INTRAMUSCULAR; INTRAVENOUS at 18:50

## 2022-05-04 RX ADMIN — HYDROCODONE BITARTRATE AND ACETAMINOPHEN 1 TABLET: 5; 325 TABLET ORAL at 06:03

## 2022-05-04 RX ADMIN — HEPARIN SODIUM 6710 UNITS: 1000 INJECTION INTRAVENOUS; SUBCUTANEOUS at 00:35

## 2022-05-04 RX ADMIN — ROPINIROLE HYDROCHLORIDE 5 MG: 1 TABLET, FILM COATED ORAL at 22:00

## 2022-05-04 RX ADMIN — METOPROLOL TARTRATE 12.5 MG: 25 TABLET, FILM COATED ORAL at 13:09

## 2022-05-04 RX ADMIN — DOCUSATE SODIUM 100 MG: 100 CAPSULE ORAL at 10:14

## 2022-05-04 RX ADMIN — METOPROLOL TARTRATE 12.5 MG: 25 TABLET, FILM COATED ORAL at 21:54

## 2022-05-04 RX ADMIN — LEVOTHYROXINE SODIUM 25 MCG: 0.03 TABLET ORAL at 07:28

## 2022-05-04 RX ADMIN — SODIUM CHLORIDE, PRESERVATIVE FREE 40 ML: 5 INJECTION INTRAVENOUS at 06:00

## 2022-05-04 RX ADMIN — METOPROLOL TARTRATE 12.5 MG: 25 TABLET, FILM COATED ORAL at 00:36

## 2022-05-04 RX ADMIN — DOCUSATE SODIUM 100 MG: 100 CAPSULE ORAL at 21:54

## 2022-05-04 RX ADMIN — SODIUM CHLORIDE, PRESERVATIVE FREE 10 ML: 5 INJECTION INTRAVENOUS at 14:00

## 2022-05-04 RX ADMIN — HEPARIN SODIUM 18 UNITS/KG/HR: 5000 INJECTION INTRAVENOUS; SUBCUTANEOUS at 00:43

## 2022-05-04 RX ADMIN — PAROXETINE HYDROCHLORIDE 40 MG: 20 TABLET, FILM COATED ORAL at 21:54

## 2022-05-04 RX ADMIN — ARFORMOTEROL TARTRATE: 15 SOLUTION RESPIRATORY (INHALATION) at 19:51

## 2022-05-04 NOTE — PROGRESS NOTES
Care Management    Reason for Admission: NSTEMI    Chart reviewed. Per H&P: Lorna Santos is a 77 y.o. female who has a history of hypertension, hyperlipidemia and diabetes as well as coronary artery disease with 2 stents placed and is followed by Dr. Nick Carolina. She presents to the emergency room complaining of 3 days of chest pain/chest pressure, shortness of breath that is worse with exertion. She stated the pain got really bad today when she decided to go for a walk in her neighborhood and became very short of breath with lots of chest pressure radiating into her left shoulder and left arm and had to actually come back and sit down for it to get better. Associated history is osteoarthritis in both of her knees. She is followed by Dr. Blanca Hernandez and is getting gel injections.        Prior to admission patient was living: with her daughter and son in law    Prior to admission patient was using the following DME:  none                   RUR Score:  5%                  Plan for utilizing home health: To be determined based on clinical course       COVID Vaccine Status:     PCP: First and Last name: Fabiano Tee MD    Name of Practice:    Are you a current patient: Yes/No:    Approximate date of last visit:    Can you participate in a virtual visit with your PCP:                     Current Advanced Directive/Advance Care Plan: Full Code    Healthcare Decision Maker:   Click here to complete [address] including selection of the Healthcare Decision Maker Relationship (ie \"Primary\")                         Transition of Care Plan:     CM spoke with patient's daughter via telephone who confirmed contact info, her mother's PCP and that the patient lives with the daughter and son in law. According to the daughter the patient does not use home O2 and does use DME. CM will follow up with patient to complete CM assessment. Care Management Interventions  PCP Verified by CM:  Yes  Transition of Care Consult (CM Consult): Discharge Planning  Support Systems: Child(carito)  Discharge Location  Patient Expects to be Discharged to[de-identified]  (discharge home with physician follow up)

## 2022-05-04 NOTE — CONSULTS
[facility] Consult Note      Patient: Diego Farmer MRN: [id number]  SSN: [id number]    YOB: 1955  Age: 77 y.o. Sex: female    Date of Consultation: 05/04/2022  Referring Physician: Ashley Chung  Reason for Consultation: Chest pain, abnormal troponin    Chief complain: Chest pain    HPI:  78-year-old female came to emergency with complaining of chest pain and shortness of breath for last 2-3 days. Chest pain is midsternal, pressure-like, no radiation and associated with shortness of breath and dizziness. She has arthritis of both knee and she was given recent gel injection. For last few days her knee was bothering and she could not able to walk. Now when she started walking she developed chest pain and shortness of breath. Cardiology consult called for evaluation of chest pain and abnormal troponin. Currently she is chest pain-free. Denies any palpitation, presyncope or syncope. Denies any smoking or alcohol abuse. Patient has known coronary artery disease and she had PCI done in 2016. At that time she had similar symptoms.     Past Medical History:   Diagnosis Date    Asthma     Diabetes [address] [address] [address]     Hypertension     Hypertension, accelerated 4/13/2016    Positive cardiac stress test 4/14/2016    Precordial pain 4/13/2016    Psychiatric disorder     depression     Past Surgical History:   Procedure Laterality Date    HX ORTHOPAEDIC      neck surgery    HX UROLOGICAL      bladder surgery     Current Facility-Administered Medications   Medication Dose Route Frequency    heparin 25,000 units in  mL infusion  18-36 Units/kg/hr IntraVENous TITRATE    sodium chloride (NS) flush 5-40 mL  5-40 mL IntraVENous Q8H    sodium chloride (NS) flush 5-40 mL  5-40 mL IntraVENous PRN    magnesium hydroxide (MILK OF MAGNESIA) 400 mg/5 mL oral suspension 30 mL  30 mL Oral DAILY PRN    docusate sodium (COLACE) capsule 100 mg  100 mg Oral BID    acetaminophen (TYLENOL) tablet 650 mg  650 mg Oral Q6H PRN    HYDROcodone-acetaminophen (NORCO) 5-325 mg per tablet 1 Tablet  1 Tablet Oral Q6H PRN    morphine injection 2 mg  2 mg IntraVENous Q4H PRN    nitroglycerin (NITROSTAT) tablet 0.4 mg  0.4 mg SubLINGual Q5MIN PRN    [START ON 5/5/2022] aspirin chewable tablet 81 mg  81 mg Oral DAILY    ondansetron (ZOFRAN) injection 4 mg  4 mg IntraVENous Q4H PRN    melatonin tablet 3 mg  3 mg Oral QHS PRN    sodium chloride (NS) flush 5-40 mL  5-40 mL IntraVENous Q8H    sodium chloride (NS) flush 5-40 mL  5-40 mL IntraVENous PRN    insulin lispro (HUMALOG) injection   SubCUTAneous AC&HS    glucose chewable tablet 16 g  4 Tablet Oral PRN    glucagon (GLUCAGEN) injection 1 mg  1 mg IntraMUSCular PRN    dextrose 10% infusion 0-250 mL  0-250 mL IntraVENous PRN    metoprolol tartrate (LOPRESSOR) tablet 12.5 mg  12.5 mg Oral Q12H    levothyroxine (SYNTHROID) tablet 25 mcg  25 mcg Oral ACB       Allergies and Intolerances: Allergies   Allergen Reactions    Statins-Hmg-Coa Reductase Inhibitors Other (comments)       Family History:   History reviewed. No pertinent family history. Social History:   She  reports that she has never smoked. She has never used smokeless tobacco.  She  reports no history of alcohol use. Review of Systems:     Gen: No fever, chills, malaise, weight loss/gain. Heent: No headache, rhinorrhea, epistaxis, ear pain, hearing loss, sinus pain, neck pain/stiffness, sore throat. Heart:   Positive chest pain, shortness of breath, no palpitations,pnd, or orthopnea. Resp: No cough, hemoptysis, wheezing and  Dyspnea  GI: No nausea, vomiting, diarrhea, constipation, melena or hematochezia. : No urinary obstruction, dysuria or hematuria. Derm: No rash, new skin lesion or pruritis. Musc/skeletal:  Positive bone or joint complains. Vasc: No edema, cyanosis or claudication. Endo: No heat/cold intolerance, no polyuria,polydipsia or polyphagia.    Neuro: No unilateral weakness, numbness, tingling. No seizures. Heme: No easy bruising or bleeding. Physical:   Patient Vitals for the past 6 hrs:   Temp Pulse Resp BP SpO2   05/04/22 1503 97.8 °F (36.6 °C) 78 20 128/62 94 %   05/04/22 1256 -- 84 18 -- 95 %         Exam:   General Appearance: Comfortable, not using accessory muscles of respiration. HEENT: PAT. HEAD: Atraumatic  NECK: No JVD, no thyroidomeglay. CAROTIDS: no bruit  LUNGS: Clear bilaterally. HEART: S1+S2 audible, no murmur, no pericardial rub. ABD: Non-tender, BS Audible    EXT: No edema, and no cyanosis. VASCULAR EXAM: Pulses are intact. PSYCHIATRIC EXAM: Mood is appropriate. MUSCULOSKELETAL: Grossly no joint deformity.   NEUROLOGICAL: AAO times 3, Motor and sensory sytem intact     Review of Data:   LABS:   Lab Results   Component Value Date/Time    WBC 6.0 05/04/2022 03:39 AM    HGB 12.2 05/04/2022 03:39 AM    HCT 38.6 05/04/2022 03:39 AM    PLATELET 523 61/09/3416 03:39 AM     Lab Results   Component Value Date/Time    Sodium 142 05/04/2022 03:39 AM    Potassium 3.9 05/04/2022 03:39 AM    Chloride 111 05/04/2022 03:39 AM    CO2 26 05/04/2022 03:39 AM    Glucose 147 (H) 05/04/2022 03:39 AM    BUN 17 05/04/2022 03:39 AM    Creatinine 0.79 05/04/2022 03:39 AM     Lab Results   Component Value Date/Time    Cholesterol, total 205 (H) 05/04/2022 03:39 AM    HDL Cholesterol 57 05/04/2022 03:39 AM    LDL, calculated 123.6 (H) 05/04/2022 03:39 AM    Triglyceride 122 05/04/2022 03:39 AM     No components found for: GPT  Lab Results   Component Value Date/Time    Hemoglobin A1c 7.7 (H) 05/04/2022 03:39 AM         Cardiology Procedures:   Results for orders placed or performed during the hospital encounter of 05/03/22   EKG, 12 LEAD, INITIAL   Result Value Ref Range    Ventricular Rate 102 BPM    Atrial Rate 102 BPM    P-R Interval 174 ms    QRS Duration 86 ms    Q-T Interval 354 ms    QTC Calculation (Bezet) 461 ms    Calculated P Axis 41 degrees    Calculated R Axis 44 degrees Calculated T Axis 23 degrees    Diagnosis       Sinus tachycardia  Otherwise normal ECG  Confirmed by Lizeth Padron MD, Marci Gomez ([id number]) on 5/3/2022 9:46:20 PM             Impression / Plan:    Patient Active Problem List   Diagnosis Code    DM II (diabetes mellitus, type II), controlled ([facility] [facility] E11.9    Generalized anxiety disorder F41.1    Coronary artery disease involving native coronary artery I25.10    Non-STEMI (non-ST elevated myocardial infarction) ([facility] I21.4    HTN (hypertension) I10    Hx of heart artery stent Z95.5       She had cardiac catheterization was done in 04/2016     1. LEFT MAIN: Normal.  2. LAD: There was a proximal 10% to 15% stenosis followed by a 20% to 25%  stenosis, and then there was a 95% stenosis in the proximal-mid vessel  and the mid-distal vessel, a 10% to 15% stenosis was also noted. 3. Left circumflex: The left circumflex vessel had scattered luminal  irregularities with at most 10% stenosis in the proximal to mid vessel. The  obtuse marginal branches were normal.  4. RCA: There was a proximal 10% stenosis followed by another 10% stenosis. In the mid vessel, there was a 10% to 15% stenosis. There were a few  scattered luminal irregularities in the mid-distal vessel. The  posterolateral branch and posterior descending coronary artery appeared  normal.     CONCLUSIONS  1. Single-vessel occlusive coronary artery disease (left anterior  descending) with 3-vessel coronary artery very mild plaquing noted. 2. Successful percutaneous transluminal coronary stenting of the  proximal-mid left anterior descending using drug-eluting stents (Xience). PCI was done with Xience 3.0X12 mm followed by 2.75X8 mm stent in overlapping fashion and post dilated with 3.25 mm balloon up to 20 atmosphere pressure      Echocardiogram done revealed      Left Ventricle: The EF by visual approximation is 55 - 60%. Left ventricle size is normal. Mildly increased wall thickness.  Findings consistent with mild concentric hypertrophy. Normal wall motion. Grade I diastolic dysfunction.   Mild tricuspid regurgitation. Estimated PASP 31 mm hg       Plan:    Continue aspirin and beta-blocker. Patient has statin allergy. I advised ER physician to start IV heparin. Continue IV heparin as per a PTT. Continue telemetry monitoring. In view of non-STEMI advised about left heart catheterization, coronary angiogram plus or minus PCI. All risks, benefits and alternatives explained to patient and family member and they verbally understood. Discussed with patient about risk of cardiac catheterization including not limited are hematoma, retroperitoneal bleed, pseudoaneurysm, AV fistula, dissection, thrombosis and embolism, radial artery occlusion, myocardial infarction, CVA, TIA, dissection and perforation of great vessels, cardiac perforation, tamponade, atheroembolism, allergy reaction, infection, acute renal failure, arrhythmias, radiation injury, congestive heart failure, respiratory failure, multiorgan failure, death. Patient agreed for procedure. NPO after midnight.         Signed By: Shyanne Harley MD     May 4, 2022

## 2022-05-04 NOTE — H&P
History & Physical    Patient: Agustina Wilder MRN: [id number]  CSN: [id number]    YOB: 1955  Age: 77 y.o. Sex: female      DOA: 5/3/2022  Primary Care Provider:  Juice Mota MD      Assessment/Plan   Agustina Wilder is a 77 y.o. female who has a history of hypertension, hyperlipidemia and diabetes as well as coronary artery disease with 2 stents placed and is followed by Dr. Veronica Hathaway. She presents to the emergency room complaining of 3 days of chest pain/chest pressure, shortness of breath that is worse with exertion. Admitted for NSTEMI. NSTEMI   Chest pain with exertional shortness of breath  Hypertension, with mildly elevated systolic blood pressure on presentation  Diabetes mellitus  Coronary artery disease, with stent placement x2    Admit to cardiac floor with telemetry monitoring  EKG unremarkable  Elevated troponin  ED department consulted cardiology, Dr. Zac Padilla following  Aspirin, heparin drip, metoprolol 12.5 mg started in the emergency department  We will continue metoprolol 12.5 mg twice daily  We will trend cardiac enzymes  We will obtain echocardiogram in a.m.   Did not start statin because patient had allergic reaction which patient describes as severe myalgias    Daughter at bedside, Melisa Jacob, medical power of  for patient, [phone]    DVT prophylaxis covered by heparin drip  GI prophylaxis covered by Pepcid    Patient desires to be full code    Patient Active Problem List   Diagnosis Code    DM II (diabetes mellitus, type II), controlled ([facility] [address] E11.9    Generalized anxiety disorder F41.1    Coronary artery disease involving native coronary artery I25.10    Non-STEMI (non-ST elevated myocardial infarction) ([facility] [address] I21.4    HTN (hypertension) I10    Hx of heart artery stent Z95.5     Estimated length of stay : 2 to 3 days    CC: chest pain with SOB worse with exertion        HPI:     Agustina Wilder is a 77 y.o. female who has a history of hypertension, hyperlipidemia and diabetes as well as coronary artery disease with 2 stents placed and is followed by Dr. Jose Norwood. She presents to the emergency room complaining of 3 days of chest pain/chest pressure, shortness of breath that is worse with exertion. She stated the pain got really bad today when she decided to go for a walk in her neighborhood and became very short of breath with lots of chest pressure radiating into her left shoulder and left arm and had to actually come back and sit down for it to get better. Associated history is osteoarthritis in both of her knees. She is followed by Dr. Rosalind Philip and is getting gel injections. Past Medical History:   Diagnosis Date    Asthma     Diabetes ([address]     Hypertension     Hypertension, accelerated 4/13/2016    Positive cardiac stress test 4/14/2016    Precordial pain 4/13/2016    Psychiatric disorder     depression       Past Surgical History:   Procedure Laterality Date    HX ORTHOPAEDIC      neck surgery    HX UROLOGICAL      bladder surgery       History reviewed. No pertinent family history. Social History     Socioeconomic History    Marital status:    Tobacco Use    Smoking status: Never Smoker    Smokeless tobacco: Never Used   Vaping Use    Vaping Use: Never used   Substance and Sexual Activity    Alcohol use: No    Drug use: Never       Prior to Admission medications    Medication Sig Start Date End Date Taking? Authorizing Provider   rOPINIRole (Requip) 5 mg tablet Take 1 Tab by mouth nightly. 12/30/20   Daisy Maravilla MD   polyethylene glycol (Miralax) 17 gram/dose powder Take 17 g by mouth daily. 1 tablespoon with 8 oz of water daily 12/30/20   Daisy Maravilla MD   cyclobenzaprine (FLEXERIL) 10 mg tablet Take 1 Tab by mouth three (3) times daily as needed for Muscle Spasm(s). 5/1/19   Tom Davidson MD   aspirin delayed-release 81 mg tablet Take 1 Tab by mouth daily.  4/15/16   Sheila Nance MD   ticagrelor (BRILINTA) 90 mg tablet Take 1 Tab by mouth two (2) times a day. 4/15/16   Sophia Salinas MD   metFORMIN (GLUCOPHAGE) 500 mg tablet Take 500 mg by mouth daily (with breakfast). Other, MD Tamar   lisinopril-hydrochlorothiazide (PRINZIDE, ZESTORETIC) 10-12.5 mg per tablet Take 1 Tab by mouth daily. Other, MD Tamar   PARoxetine (PAXIL) 40 mg tablet Take 40 mg by mouth every evening. Provider, Historical   cycloSPORINE (RESTASIS) 0.05 % ophthalmic emulsion Administer 1 Drop to both eyes every twelve (12) hours. Provider, Historical   budesonide-formoterol (SYMBICORT) 80-4.5 mcg/actuation HFAA inhaler Take 2 Puffs by inhalation two (2) times a day. Provider, Historical   albuterol (PROVENTIL HFA, VENTOLIN HFA, PROAIR HFA) 90 mcg/actuation inhaler Take 2 Puffs by inhalation every four (4) hours as needed for Wheezing. Provider, Historical   vitamin E (AQUA GEMS) 400 unit capsule Take 400 Units by mouth daily. Provider, Historical   vit a,c & e-lutein-minerals (OCUVITE) tablet 1 Tab daily. Provider, Historical   potassium 99 mg tablet Take 99 mg by mouth daily. Provider, Historical       Allergies   Allergen Reactions    Statins-Hmg-Coa Reductase Inhibitors Other (comments)       Review of Systems  Gen: No fever, chills, malaise, weight loss/gain. Heent: No headache, rhinorrhea, epistaxis, ear pain, hearing loss, sinus pain, neck pain/stiffness, sore throat. Heart: No chest pain, palpitations, BIRD, pnd, or orthopnea. Resp: No cough, hemoptysis, wheezing and shortness of breath. GI: No nausea, vomiting, diarrhea, constipation, melena or hematochezia. : No urinary obstruction, dysuria or hematuria. Derm: No rash, new skin lesion or pruritis. Musc/skeletal: no bone or joint complains. Vasc: No edema, cyanosis or claudication. Endo: No heat/cold intolerance, no polyuria,polydipsia or polyphagia. Neuro: No unilateral weakness, numbness, tingling. No seizures.    Heme: No easy bruising or bleeding. Physical Exam:     Physical Exam:  Visit Vitals  /62   Pulse 69   Temp 97.8 °F (36.6 °C)   Resp 16   Ht 5' 1\" (1.549 m)   Wt 83.9 kg (185 lb)   SpO2 95%   BMI 34.96 kg/m²      O2 Device: None (Room air)    Temp (24hrs), Av.8 °F (36.6 °C), Min:97.8 °F (36.6 °C), Max:97.8 °F (36.6 °C)    No intake/output data recorded. No intake/output data recorded. General:  Awake, cooperative, no distress. Head:  Normocephalic, without obvious abnormality, atraumatic. Eyes:  Conjunctivae/corneas clear, sclera anicteric, PERRL, EOMs intact. Nose: Nares normal. No drainage or sinus tenderness. Throat: Lips, mucosa, and tongue normal.    Neck: Supple, symmetrical, trachea midline, no adenopathy. Lungs:   Clear to auscultation bilaterally. Heart:  Regular rate and rhythm, S1, S2 normal, no murmur, click, rub or gallop. Abdomen: Soft, non-tender. Bowel sounds normal. No masses,  No organomegaly. Extremities: Extremities normal, atraumatic, no cyanosis or edema. Capillary refill normal.   Pulses: 2+ and symmetric all extremities. Skin: Skin color as per ethnicity, turgor normal. No rashes or lesions   Neurologic: CNII-XII intact. No focal motor or sensory deficit.        Labs Reviewed:  Recent Results (from the past 24 hour(s))   EKG, 12 LEAD, INITIAL    Collection Time: 22  8:53 PM   Result Value Ref Range    Ventricular Rate 102 BPM    Atrial Rate 102 BPM    P-R Interval 174 ms    QRS Duration 86 ms    Q-T Interval 354 ms    QTC Calculation (Bezet) 461 ms    Calculated P Axis 41 degrees    Calculated R Axis 44 degrees    Calculated T Axis 23 degrees    Diagnosis       Sinus tachycardia  Otherwise normal ECG  Confirmed by Renée Riddle MD, [facility] ([id number]) on 5/3/2022 9:46:20 PM     CBC WITH AUTOMATED DIFF    Collection Time: 22  9:00 PM   Result Value Ref Range    WBC 6.4 4.6 - 13.2 K/uL    RBC 4.39 4.20 - 5.30 M/uL    HGB 12.3 12.0 - 16.0 g/dL    HCT 38.2 35.0 - 45.0 %    MCV 87.0 78.0 - 100.0 FL    MCH 28.0 24.0 - 34.0 PG    MCHC 32.2 31.0 - 37.0 g/dL    RDW 13.9 11.6 - 14.5 %    PLATELET 991 741 - 928 K/uL    MPV 9.2 9.2 - 11.8 FL    NRBC 0.0 0  WBC    ABSOLUTE NRBC 0.00 0.00 - 0.01 K/uL    NEUTROPHILS 66 40 - 73 %    LYMPHOCYTES 24 21 - 52 %    MONOCYTES 7 3 - 10 %    EOSINOPHILS 2 0 - 5 %    BASOPHILS 1 0 - 2 %    IMMATURE GRANULOCYTES 0 0.0 - 0.5 %    ABS. NEUTROPHILS 4.2 1.8 - 8.0 K/UL    ABS. LYMPHOCYTES 1.5 0.9 - 3.6 K/UL    ABS. MONOCYTES 0.5 0.05 - 1.2 K/UL    ABS. EOSINOPHILS 0.1 0.0 - 0.4 K/UL    ABS. BASOPHILS 0.1 0.0 - 0.1 K/UL    ABS. IMM. GRANS. 0.0 0.00 - 0.04 K/UL    DF AUTOMATED     METABOLIC PANEL, COMPREHENSIVE    Collection Time: 05/03/22  9:00 PM   Result Value Ref Range    Sodium 140 136 - 145 mmol/L    Potassium 4.7 3.5 - 5.5 mmol/L    Chloride 109 100 - 111 mmol/L    CO2 27 21 - 32 mmol/L    Anion gap 4 3.0 - 18 mmol/L    Glucose 178 (H) 74 - 99 mg/dL    BUN 20 (H) 7.0 - 18 MG/DL    Creatinine 0.87 0.6 - 1.3 MG/DL    BUN/Creatinine ratio 23 (H) 12 - 20      GFR est AA >60 >60 ml/min/1.73m2    GFR est non-AA >60 >60 ml/min/1.73m2    Calcium 8.6 8.5 - 10.1 MG/DL    Bilirubin, total 0.3 0.2 - 1.0 MG/DL    ALT (SGPT) 69 (H) 13 - 56 U/L    AST (SGOT) 73 (H) 10 - 38 U/L    Alk.  phosphatase 92 45 - 117 U/L    Protein, total 6.8 6.4 - 8.2 g/dL    Albumin 3.4 3.4 - 5.0 g/dL    Globulin 3.4 2.0 - 4.0 g/dL    A-G Ratio 1.0 0.8 - 1.7     MAGNESIUM    Collection Time: 05/03/22  9:00 PM   Result Value Ref Range    Magnesium 2.1 1.6 - 2.6 mg/dL   TROPONIN-HIGH SENSITIVITY    Collection Time: 05/03/22  9:00 PM   Result Value Ref Range    Troponin-High Sensitivity 48 0 - 54 ng/L   D DIMER    Collection Time: 05/03/22  9:00 PM   Result Value Ref Range    D DIMER 0.38 <0.46 ug/ml(FEU)   PTT    Collection Time: 05/03/22  9:00 PM   Result Value Ref Range    aPTT 26.9 23.0 - 36.4 SEC   PROTHROMBIN TIME + INR    Collection Time: 05/03/22  9:00 PM   Result Value Ref Range Prothrombin time 12.3 11.5 - 15.2 sec    INR 0.9 0.8 - 1.2     TROPONIN-HIGH SENSITIVITY    Collection Time: 05/03/22 11:22 PM   Result Value Ref Range    Troponin-High Sensitivity 68 (HH) 0 - 54 ng/L   CBC W/O DIFF    Collection Time: 05/04/22  3:39 AM   Result Value Ref Range    WBC 6.0 4.6 - 13.2 K/uL    RBC 4.40 4.20 - 5.30 M/uL    HGB 12.2 12.0 - 16.0 g/dL    HCT 38.6 35.0 - 45.0 %    MCV 87.7 78.0 - 100.0 FL    MCH 27.7 24.0 - 34.0 PG    MCHC 31.6 31.0 - 37.0 g/dL    RDW 13.8 11.6 - 14.5 %    PLATELET 516 437 - 211 K/uL    MPV 9.3 9.2 - 11.8 FL    NRBC 0.0 0  WBC    ABSOLUTE NRBC 0.00 0.00 - 0.01 K/uL       Procedures/imaging: see electronic medical records for all procedures/Xrays and details which were not copied into this note but were reviewed prior to creation of Plan      CC:  King Alexis MD

## 2022-05-04 NOTE — ED TRIAGE NOTES
Ambulatory patient w/ multiple complaints. Patient reports dyspnea at rest that started yesterday, right sided chest discomfort and RLQ abdominal pain that started this morning, and chronic right knee pain that she can not control tonight. VSS. NAD able to talk in complete sentences.

## 2022-05-04 NOTE — ED NOTES
TRANSFER - OUT REPORT:    Verbal report given to Staci Palmer RN(name) on Pipo Tobin  being transferred to tele(unit) for routine progression of care       Report consisted of patients Situation, Background, Assessment and   Recommendations(SBAR). Information from the following report(s) SBAR, ED Summary, MAR, Recent Results and Cardiac Rhythm nsr was reviewed with the receiving nurse. Lines:   Peripheral IV 05/03/22 Right Antecubital (Active)       Peripheral IV 05/04/22 Left Antecubital (Active)   Site Assessment Clean, dry, & intact 05/04/22 0723   Phlebitis Assessment 0 05/04/22 0723   Infiltration Assessment 0 05/04/22 0723   Dressing Status Clean, dry, & intact 05/04/22 0723   Action Taken Blood drawn 05/04/22 8512        Opportunity for questions and clarification was provided.       Patient transported with:   Monitor  Registered Nurse

## 2022-05-04 NOTE — ED PROVIDER NOTES
EMERGENCY DEPARTMENT HISTORY AND PHYSICAL EXAM    Date: 5/3/2022  Patient Name: Melania Diallo    History of Presenting Illness     Chief Complaint   Patient presents with    Shortness of Breath       History Provided By: Patient     History Dinah Walter):   8:53 PM  Melania Diallo is a 77 y.o. female with PMHX of diabetes, hypertension, hyperlipidemia, CAD (with stent) who presents to the emergency department C/O chest pain onset 3 days ago. Associated sxs include dyspnea and dyspnea with exertion. Pt denies any other related sxs or complaints. Patient additionally complains of chronic right knee pain. Chief Complaint: Chest pain  Onset: 3 days  Timing:  Acute  Context: Symptoms started spontaneously, symptoms have progressively worsened since onset  Location: Left chest  Quality: Dull  Severity: Moderate  Modifying Factors: Nothing makes it better, walking makes it worse. Associated Symptoms: Dyspnea    PCP: Morro Joseph MD  Cardiologist: Dr. Jazmín Raymond    Past History         Past Medical History:  Past Medical History:   Diagnosis Date    Asthma     Diabetes ([address]     Hypertension     Psychiatric disorder     depression       Past Surgical History:  Past Surgical History:   Procedure Laterality Date    HX ORTHOPAEDIC      neck surgery    HX UROLOGICAL      bladder surgery       Family History:  History reviewed. No pertinent family history.   Reviewed and non-contributory    Social History:  Social History     Tobacco Use    Smoking status: Never Smoker    Smokeless tobacco: Never Used   Vaping Use    Vaping Use: Never used   Substance Use Topics    Alcohol use: No    Drug use: Never       Medications:  Current Facility-Administered Medications   Medication Dose Route Frequency Provider Last Rate Last Admin    heparin 25,000 units in  mL infusion  18-36 Units/kg/hr IntraVENous TITRATE Yoanna Taylor MD 15.1 mL/hr at 05/04/22 0043 18 Units/kg/hr at 05/04/22 0043    sodium chloride (NS) flush 5-40 mL  5-40 mL IntraVENous Q8H Abby Torres MD        sodium chloride (NS) flush 5-40 mL  5-40 mL IntraVENous PRN Abby Torres MD        magnesium hydroxide (MILK OF MAGNESIA) 400 mg/5 mL oral suspension 30 mL  30 mL Oral DAILY PRN Abby Torres MD        docusate sodium (COLACE) capsule 100 mg  100 mg Oral BID Abby Torres MD        acetaminophen (TYLENOL) tablet 650 mg  650 mg Oral Q6H PRN Abby Torres MD        HYDROcodone-acetaminophen (NORCO) 5-325 mg per tablet 1 Tablet  1 Tablet Oral Q6H PRN Abby Torres MD        morphine injection 2 mg  2 mg IntraVENous Q4H PRN Abby Torres MD        nitroglycerin (NITROSTAT) tablet 0.4 mg  0.4 mg SubLINGual Q5MIN PRN Abby Torres MD        [START ON 5/5/2022] aspirin chewable tablet 81 mg  81 mg Oral DAILY Kvng Torres MD        ondansetron (ZOFRAN) injection 4 mg  4 mg IntraVENous Q4H PRN Kvng Torres MD        melatonin tablet 3 mg  3 mg Oral QHS PRN Abby Torres MD        sodium chloride (NS) flush 5-40 mL  5-40 mL IntraVENous Q8H Kvng Torres MD        sodium chloride (NS) flush 5-40 mL  5-40 mL IntraVENous PRN Abby Torres MD         Current Outpatient Medications   Medication Sig Dispense Refill    rOPINIRole (Requip) 5 mg tablet Take 1 Tab by mouth nightly. 20 Tab 0    polyethylene glycol (Miralax) 17 gram/dose powder Take 17 g by mouth daily. 1 tablespoon with 8 oz of water daily 300 g 0    cyclobenzaprine (FLEXERIL) 10 mg tablet Take 1 Tab by mouth three (3) times daily as needed for Muscle Spasm(s). 20 Tab 0    aspirin delayed-release 81 mg tablet Take 1 Tab by mouth daily. 30 Tab 0    ticagrelor (BRILINTA) 90 mg tablet Take 1 Tab by mouth two (2) times a day. 60 Tab 0    metFORMIN (GLUCOPHAGE) 500 mg tablet Take 500 mg by mouth daily (with breakfast).       lisinopril-hydrochlorothiazide (PRINZIDE, ZESTORETIC) 10-12.5 mg per tablet Take 1 Tab by mouth daily.  PARoxetine (PAXIL) 40 mg tablet Take 40 mg by mouth every evening.  cycloSPORINE (RESTASIS) 0.05 % ophthalmic emulsion Administer 1 Drop to both eyes every twelve (12) hours.  budesonide-formoterol (SYMBICORT) 80-4.5 mcg/actuation HFAA inhaler Take 2 Puffs by inhalation two (2) times a day.  albuterol (PROVENTIL HFA, VENTOLIN HFA, PROAIR HFA) 90 mcg/actuation inhaler Take 2 Puffs by inhalation every four (4) hours as needed for Wheezing.  vitamin E (AQUA GEMS) 400 unit capsule Take 400 Units by mouth daily.  vit a,c & e-lutein-minerals (OCUVITE) tablet 1 Tab daily.  potassium 99 mg tablet Take 99 mg by mouth daily. Allergies: Allergies   Allergen Reactions    Statins-Hmg-Coa Reductase Inhibitors Other (comments)       Review of Systems      Review of Systems   Constitutional: Negative for chills and fever. HENT: Negative for congestion, rhinorrhea and sore throat. Eyes: Negative for pain and visual disturbance. Respiratory: Positive for shortness of breath. Negative for cough and wheezing. Cardiovascular: Positive for chest pain. Negative for palpitations. Gastrointestinal: Negative for abdominal pain, diarrhea and vomiting. Genitourinary: Negative for dysuria, flank pain, frequency and urgency. Musculoskeletal: Negative for arthralgias and myalgias. Skin: Negative for rash and wound. Neurological: Negative for speech difficulty, weakness, light-headedness and headaches. Psychiatric/Behavioral: Negative for agitation and confusion. All other systems reviewed and are negative. Physical Exam     Vitals:    05/03/22 2041 05/03/22 2125 05/04/22 0036   BP: (!) 141/56  (!) 155/76   Pulse: 82  89   Resp: 20     Temp: 97.8 °F (36.6 °C)     SpO2: 97% 97%    Weight: 83.9 kg (185 lb)     Height: 5' 1\" (1.549 m)         Physical Exam  Vitals and nursing note reviewed.    Constitutional: General: She is not in acute distress. Appearance: Normal appearance. She is normal weight. She is not ill-appearing. HENT:      Head: Normocephalic and atraumatic. Nose: Nose normal. No rhinorrhea. Mouth/Throat:      Mouth: Mucous membranes are moist.      Pharynx: No oropharyngeal exudate or posterior oropharyngeal erythema. Eyes:      Extraocular Movements: Extraocular movements intact. Conjunctiva/sclera: Conjunctivae normal.      Pupils: Pupils are equal, round, and reactive to light. Cardiovascular:      Rate and Rhythm: Normal rate and regular rhythm. Heart sounds: No murmur heard. No friction rub. No gallop. Pulmonary:      Effort: Pulmonary effort is normal. No respiratory distress. Breath sounds: Normal breath sounds. No wheezing, rhonchi or rales. Abdominal:      General: Bowel sounds are normal.      Palpations: Abdomen is soft. Tenderness: There is no abdominal tenderness. There is no guarding or rebound. Musculoskeletal:         General: No swelling, tenderness or deformity. Normal range of motion. Cervical back: Normal range of motion and neck supple. No rigidity. Lymphadenopathy:      Cervical: No cervical adenopathy. Skin:     General: Skin is warm and dry. Findings: No rash. Neurological:      General: No focal deficit present. Mental Status: She is alert and oriented to person, place, and time.    Psychiatric:         Mood and Affect: Mood normal.         Behavior: Behavior normal.         Diagnostic Study Results     Labs -  Recent Results (from the past 12 hour(s))   EKG, 12 LEAD, INITIAL    Collection Time: 05/03/22  8:53 PM   Result Value Ref Range    Ventricular Rate 102 BPM    Atrial Rate 102 BPM    P-R Interval 174 ms    QRS Duration 86 ms    Q-T Interval 354 ms    QTC Calculation (Bezet) 461 ms    Calculated P Axis 41 degrees    Calculated R Axis 44 degrees    Calculated T Axis 23 degrees    Diagnosis       Sinus tachycardia  Otherwise normal ECG  Confirmed by Luan Voss MD, [facility] ([id number]) on 5/3/2022 9:46:20 PM     CBC WITH AUTOMATED DIFF    Collection Time: 05/03/22  9:00 PM   Result Value Ref Range    WBC 6.4 4.6 - 13.2 K/uL    RBC 4.39 4.20 - 5.30 M/uL    HGB 12.3 12.0 - 16.0 g/dL    HCT 38.2 35.0 - 45.0 %    MCV 87.0 78.0 - 100.0 FL    MCH 28.0 24.0 - 34.0 PG    MCHC 32.2 31.0 - 37.0 g/dL    RDW 13.9 11.6 - 14.5 %    PLATELET 702 596 - 473 K/uL    MPV 9.2 9.2 - 11.8 FL    NRBC 0.0 0  WBC    ABSOLUTE NRBC 0.00 0.00 - 0.01 K/uL    NEUTROPHILS 66 40 - 73 %    LYMPHOCYTES 24 21 - 52 %    MONOCYTES 7 3 - 10 %    EOSINOPHILS 2 0 - 5 %    BASOPHILS 1 0 - 2 %    IMMATURE GRANULOCYTES 0 0.0 - 0.5 %    ABS. NEUTROPHILS 4.2 1.8 - 8.0 K/UL    ABS. LYMPHOCYTES 1.5 0.9 - 3.6 K/UL    ABS. MONOCYTES 0.5 0.05 - 1.2 K/UL    ABS. EOSINOPHILS 0.1 0.0 - 0.4 K/UL    ABS. BASOPHILS 0.1 0.0 - 0.1 K/UL    ABS. IMM. GRANS. 0.0 0.00 - 0.04 K/UL    DF AUTOMATED     METABOLIC PANEL, COMPREHENSIVE    Collection Time: 05/03/22  9:00 PM   Result Value Ref Range    Sodium 140 136 - 145 mmol/L    Potassium 4.7 3.5 - 5.5 mmol/L    Chloride 109 100 - 111 mmol/L    CO2 27 21 - 32 mmol/L    Anion gap 4 3.0 - 18 mmol/L    Glucose 178 (H) 74 - 99 mg/dL    BUN 20 (H) 7.0 - 18 MG/DL    Creatinine 0.87 0.6 - 1.3 MG/DL    BUN/Creatinine ratio 23 (H) 12 - 20      GFR est AA >60 >60 ml/min/1.73m2    GFR est non-AA >60 >60 ml/min/1.73m2    Calcium 8.6 8.5 - 10.1 MG/DL    Bilirubin, total 0.3 0.2 - 1.0 MG/DL    ALT (SGPT) 69 (H) 13 - 56 U/L    AST (SGOT) 73 (H) 10 - 38 U/L    Alk.  phosphatase 92 45 - 117 U/L    Protein, total 6.8 6.4 - 8.2 g/dL    Albumin 3.4 3.4 - 5.0 g/dL    Globulin 3.4 2.0 - 4.0 g/dL    A-G Ratio 1.0 0.8 - 1.7     MAGNESIUM    Collection Time: 05/03/22  9:00 PM   Result Value Ref Range    Magnesium 2.1 1.6 - 2.6 mg/dL   TROPONIN-HIGH SENSITIVITY    Collection Time: 05/03/22  9:00 PM   Result Value Ref Range    Troponin-High Sensitivity 48 0 - 54 ng/L   D DIMER    Collection Time: 05/03/22  9:00 PM   Result Value Ref Range    D DIMER 0.38 <0.46 ug/ml(FEU)   PTT    Collection Time: 05/03/22  9:00 PM   Result Value Ref Range    aPTT 26.9 23.0 - 36.4 SEC   PROTHROMBIN TIME + INR    Collection Time: 05/03/22  9:00 PM   Result Value Ref Range    Prothrombin time 12.3 11.5 - 15.2 sec    INR 0.9 0.8 - 1.2     TROPONIN-HIGH SENSITIVITY    Collection Time: 05/03/22 11:22 PM   Result Value Ref Range    Troponin-High Sensitivity 68 (HH) 0 - 54 ng/L       Radiologic Studies -     2:06 AM  RADIOLOGY FINDINGS  Chest X-ray shows no acute cardiopulmonary process.   Pending review by Radiologist  Recorded by Reva Lala MD.    XR CHEST PORT    (Results Pending)     CT Results  (Last 48 hours)    None        CXR Results  (Last 48 hours)    None          Medications given in the ED-  Medications   heparin 25,000 units in  mL infusion (18 Units/kg/hr × 83.9 kg IntraVENous New Bag 5/4/22 0043)   sodium chloride (NS) flush 5-40 mL (has no administration in time range)   sodium chloride (NS) flush 5-40 mL (has no administration in time range)   magnesium hydroxide (MILK OF MAGNESIA) 400 mg/5 mL oral suspension 30 mL (has no administration in time range)   docusate sodium (COLACE) capsule 100 mg (has no administration in time range)   acetaminophen (TYLENOL) tablet 650 mg (has no administration in time range)   HYDROcodone-acetaminophen (NORCO) 5-325 mg per tablet 1 Tablet (has no administration in time range)   morphine injection 2 mg (has no administration in time range)   nitroglycerin (NITROSTAT) tablet 0.4 mg (has no administration in time range)   aspirin chewable tablet 81 mg (has no administration in time range)   ondansetron (ZOFRAN) injection 4 mg (has no administration in time range)   melatonin tablet 3 mg (has no administration in time range)   sodium chloride (NS) flush 5-40 mL (has no administration in time range)   sodium chloride (NS) flush 5-40 mL (has no administration in time range)   aspirin chewable tablet 162 mg (162 mg Oral Given 5/3/22 2101)   metoprolol tartrate (LOPRESSOR) tablet 12.5 mg (12.5 mg Oral Given 5/4/22 0036)   heparin (porcine) 1,000 unit/mL injection 6,710 Units (6,710 Units IntraVENous Given 5/4/22 0035)       Procedures     Procedures    ED Course     I am the first provider for this patient. I reviewed the vital signs, available nursing notes, past medical history, past surgical history, family history and social history. Records Reviewed: Nursing Notes    Cardiac Monitor:  Rate: 82 bpm  Rhythm: sinus rhythm    Pulse Oximetry Analysis - 97% on RA    EKG interpretation: (Preliminary)  Rhythm: sinus tachycardia. Rate: 102 bpm; no STEMI  EKG read by Titus Ariza MD at 8:53 PM    8:53 PM Initial assessment performed. The patients presenting problems have been discussed, and they are in agreement with the care plan formulated and outlined with them. I have encouraged them to ask questions as they arise throughout their visit. ED Course as of 05/04/22 0209   Wed May 04, 2022   0021 Discussed with Dr. Adeola Parekh, Pt has an LAD stent, start heparin, metoprolol 12.5 mg po bid, trend enzymes, echo in the morning [JM]   0022 Discussed with Dr. Zaina Olivares for telemetry admission. [JM]      ED Course User Index  [JM] Mckenzie Calvo MD         Medical Decision Making     Provider Notes (Medical Decision Making):   DDX: ACS, URI, pneumonia, PE    Discussion:  77 y.o. female with 3 days of acute chest pain, dyspnea and dyspnea on exertion. Patient had a negative D-dimer in the ED and a troponin in the intermediate range. Repeat troponin was done 2 hours later which was increasing by more than 20%. Discussed with cardiologist who recommended inpatient treatment as listed above. Discussed with hospitalist will admit for further work-up. Patient and family understand and agree with the need for admission.      Critical Care Time:   I have spent 45 minutes of critical care time involved in lab review, consultations with specialist, family decision-making, and documentation. During this entire length of time I was immediately available to the patient. Critical Care: The reason for providing this level of medical care for this critically ill patient was due a critical illness that impaired one or more vital organ systems such that there was a high probability of imminent or life threatening deterioration in the patients condition. This care involved high complexity decision making to assess, manipulate, and support vital system functions, to treat this degreee vital organ system failure and to prevent further life threatening deterioration of the patients condition. Izabel Villagomez MD    Diagnosis and Disposition     Critical Care Time: 45 minutes    Core Measures:  For Hospitalized Patients:    1. Hospitalization Decision Time:  The decision to hospitalize the patient was made by Chloé Lagos MD, MD at 12:21 AM on 5/3/2022    2. Aspirin: Aspirin was given    12:22 AM patient is being admitted to the hospital by Dr. Temitope Jung. The results of their tests and reasons for their admission have been discussed with them and/or available family. They convey agreement and understanding for the need to be admitted and for their admission diagnosis. CONDITIONS ON ADMISSION:  Sepsis is not present at the time of admission. Deep Vein Thrombosis is not present at the time of admission. Thrombosis is not present at the time of admission. Urinary Tract Infection is not present at the time of admission. Pneumonia is not present at the time of admission. MRSA is not present at the time of admission. Wound infection is not present at the time of admission. Pressure Ulcer is not present at the time of admission.       CLINICAL IMPRESSION:    1. Non-ST elevated myocardial infarction [facility])          Dragon Disclaimer     Please note that this dictation was completed with Dragon, the computer voice recognition software. Quite often unanticipated grammatical, syntax, homophones, and other interpretive errors are inadvertently transcribed by the computer software. Please disregard these errors. Please excuse any errors that have escaped final proofreading.     Octavia Haynes MD

## 2022-05-05 LAB
ACT BLD: 291 SECS (ref 79–138)
ACT BLD: 327 SECS (ref 79–138)
ALBUMIN SERPL-MCNC: 3.3 G/DL (ref 3.4–5)
ALBUMIN/GLOB SERPL: 1.1 {RATIO} (ref 0.8–1.7)
ALP SERPL-CCNC: 68 U/L (ref 45–117)
ALT SERPL-CCNC: 71 U/L (ref 13–56)
ANION GAP SERPL CALC-SCNC: 4 MMOL/L (ref 3–18)
APTT PPP: 90.4 SEC (ref 23–36.4)
AST SERPL-CCNC: 33 U/L (ref 10–38)
BASOPHILS # BLD: 0.1 K/UL (ref 0–0.1)
BASOPHILS NFR BLD: 1 % (ref 0–2)
BILIRUB SERPL-MCNC: 0.6 MG/DL (ref 0.2–1)
BUN SERPL-MCNC: 13 MG/DL (ref 7–18)
BUN/CREAT SERPL: 18 (ref 12–20)
CALCIUM SERPL-MCNC: 8.7 MG/DL (ref 8.5–10.1)
CHLORIDE SERPL-SCNC: 108 MMOL/L (ref 100–111)
CO2 SERPL-SCNC: 28 MMOL/L (ref 21–32)
CREAT SERPL-MCNC: 0.73 MG/DL (ref 0.6–1.3)
DIFFERENTIAL METHOD BLD: ABNORMAL
EOSINOPHIL # BLD: 0.1 K/UL (ref 0–0.4)
EOSINOPHIL NFR BLD: 1 % (ref 0–5)
ERYTHROCYTE [DISTWIDTH] IN BLOOD BY AUTOMATED COUNT: 14 % (ref 11.6–14.5)
GLOBULIN SER CALC-MCNC: 3.1 G/DL (ref 2–4)
GLUCOSE BLD STRIP.AUTO-MCNC: 133 MG/DL (ref 70–110)
GLUCOSE BLD STRIP.AUTO-MCNC: 143 MG/DL (ref 70–110)
GLUCOSE BLD STRIP.AUTO-MCNC: 172 MG/DL (ref 70–110)
GLUCOSE BLD STRIP.AUTO-MCNC: 182 MG/DL (ref 70–110)
GLUCOSE SERPL-MCNC: 172 MG/DL (ref 74–99)
HCT VFR BLD AUTO: 37.4 % (ref 35–45)
HCT VFR BLD AUTO: 39.5 % (ref 35–45)
HGB BLD-MCNC: 11.8 G/DL (ref 12–16)
HGB BLD-MCNC: 12.9 G/DL (ref 12–16)
IMM GRANULOCYTES # BLD AUTO: 0 K/UL (ref 0–0.04)
IMM GRANULOCYTES NFR BLD AUTO: 0 % (ref 0–0.5)
INR PPP: 0.9 (ref 0.8–1.2)
LYMPHOCYTES # BLD: 1.4 K/UL (ref 0.9–3.6)
LYMPHOCYTES NFR BLD: 19 % (ref 21–52)
MCH RBC QN AUTO: 27.8 PG (ref 24–34)
MCHC RBC AUTO-ENTMCNC: 31.6 G/DL (ref 31–37)
MCV RBC AUTO: 88.2 FL (ref 78–100)
MONOCYTES # BLD: 0.4 K/UL (ref 0.05–1.2)
MONOCYTES NFR BLD: 6 % (ref 3–10)
NEUTS SEG # BLD: 5.4 K/UL (ref 1.8–8)
NEUTS SEG NFR BLD: 73 % (ref 40–73)
NRBC # BLD: 0 K/UL (ref 0–0.01)
NRBC BLD-RTO: 0 PER 100 WBC
PLATELET # BLD AUTO: 261 K/UL (ref 135–420)
PMV BLD AUTO: 9.4 FL (ref 9.2–11.8)
POTASSIUM SERPL-SCNC: 4.1 MMOL/L (ref 3.5–5.5)
PROT SERPL-MCNC: 6.4 G/DL (ref 6.4–8.2)
PROTHROMBIN TIME: 12.9 SEC (ref 11.5–15.2)
RBC # BLD AUTO: 4.24 M/UL (ref 4.2–5.3)
SODIUM SERPL-SCNC: 140 MMOL/L (ref 136–145)
WBC # BLD AUTO: 7.4 K/UL (ref 4.6–13.2)

## 2022-05-05 PROCEDURE — B2111ZZ FLUOROSCOPY OF MULTIPLE CORONARY ARTERIES USING LOW OSMOLAR CONTRAST: ICD-10-PCS | Performed by: INTERNAL MEDICINE

## 2022-05-05 PROCEDURE — 4A023N7 MEASUREMENT OF CARDIAC SAMPLING AND PRESSURE, LEFT HEART, PERCUTANEOUS APPROACH: ICD-10-PCS | Performed by: INTERNAL MEDICINE

## 2022-05-05 PROCEDURE — 85025 COMPLETE CBC W/AUTO DIFF WBC: CPT

## 2022-05-05 PROCEDURE — C1894 INTRO/SHEATH, NON-LASER: HCPCS | Performed by: INTERNAL MEDICINE

## 2022-05-05 PROCEDURE — 74011000636 HC RX REV CODE- 636: Performed by: INTERNAL MEDICINE

## 2022-05-05 PROCEDURE — 85730 THROMBOPLASTIN TIME PARTIAL: CPT

## 2022-05-05 PROCEDURE — 74011000250 HC RX REV CODE- 250: Performed by: INTERNAL MEDICINE

## 2022-05-05 PROCEDURE — 85610 PROTHROMBIN TIME: CPT

## 2022-05-05 PROCEDURE — 36415 COLL VENOUS BLD VENIPUNCTURE: CPT

## 2022-05-05 PROCEDURE — 77030008543 HC TBNG MON PRSS MRTM -A: Performed by: INTERNAL MEDICINE

## 2022-05-05 PROCEDURE — C1725 CATH, TRANSLUMIN NON-LASER: HCPCS | Performed by: INTERNAL MEDICINE

## 2022-05-05 PROCEDURE — 65270000046 HC RM TELEMETRY

## 2022-05-05 PROCEDURE — 94760 N-INVAS EAR/PLS OXIMETRY 1: CPT

## 2022-05-05 PROCEDURE — 74011250636 HC RX REV CODE- 250/636: Performed by: INTERNAL MEDICINE

## 2022-05-05 PROCEDURE — C1769 GUIDE WIRE: HCPCS | Performed by: INTERNAL MEDICINE

## 2022-05-05 PROCEDURE — 92929 HC PLC DE STNT +/-PTA MAJOR COR VESL/BRNCH  ADD LD: CPT | Performed by: INTERNAL MEDICINE

## 2022-05-05 PROCEDURE — 77030013519 HC DEV INFL BASIX MRTM -B: Performed by: INTERNAL MEDICINE

## 2022-05-05 PROCEDURE — 99153 MOD SED SAME PHYS/QHP EA: CPT | Performed by: INTERNAL MEDICINE

## 2022-05-05 PROCEDURE — 93458 L HRT ARTERY/VENTRICLE ANGIO: CPT | Performed by: INTERNAL MEDICINE

## 2022-05-05 PROCEDURE — 74011636637 HC RX REV CODE- 636/637: Performed by: FAMILY MEDICINE

## 2022-05-05 PROCEDURE — 80053 COMPREHEN METABOLIC PANEL: CPT

## 2022-05-05 PROCEDURE — 74011000250 HC RX REV CODE- 250: Performed by: FAMILY MEDICINE

## 2022-05-05 PROCEDURE — 77030013797 HC KT TRNSDUC PRSSR EDWD -A: Performed by: INTERNAL MEDICINE

## 2022-05-05 PROCEDURE — 85347 COAGULATION TIME ACTIVATED: CPT

## 2022-05-05 PROCEDURE — 82962 GLUCOSE BLOOD TEST: CPT

## 2022-05-05 PROCEDURE — C1874 STENT, COATED/COV W/DEL SYS: HCPCS | Performed by: INTERNAL MEDICINE

## 2022-05-05 PROCEDURE — 93005 ELECTROCARDIOGRAM TRACING: CPT

## 2022-05-05 PROCEDURE — 77030010221 HC SPLNT WR POS TELE -B: Performed by: INTERNAL MEDICINE

## 2022-05-05 PROCEDURE — 77030004522 HC CATH ANGI DX EXPO BSC -A: Performed by: INTERNAL MEDICINE

## 2022-05-05 PROCEDURE — 027135Z DILATION OF CORONARY ARTERY, TWO ARTERIES WITH TWO DRUG-ELUTING INTRALUMINAL DEVICES, PERCUTANEOUS APPROACH: ICD-10-PCS | Performed by: INTERNAL MEDICINE

## 2022-05-05 PROCEDURE — 99152 MOD SED SAME PHYS/QHP 5/>YRS: CPT | Performed by: INTERNAL MEDICINE

## 2022-05-05 PROCEDURE — 94640 AIRWAY INHALATION TREATMENT: CPT

## 2022-05-05 PROCEDURE — 77010033678 HC OXYGEN DAILY

## 2022-05-05 PROCEDURE — 74011250636 HC RX REV CODE- 250/636: Performed by: FAMILY MEDICINE

## 2022-05-05 PROCEDURE — 74011250637 HC RX REV CODE- 250/637: Performed by: FAMILY MEDICINE

## 2022-05-05 PROCEDURE — 74011250637 HC RX REV CODE- 250/637: Performed by: INTERNAL MEDICINE

## 2022-05-05 PROCEDURE — C1887 CATHETER, GUIDING: HCPCS | Performed by: INTERNAL MEDICINE

## 2022-05-05 PROCEDURE — 85018 HEMOGLOBIN: CPT

## 2022-05-05 PROCEDURE — 92928 PRQ TCAT PLMT NTRAC ST 1 LES: CPT | Performed by: INTERNAL MEDICINE

## 2022-05-05 DEVICE — XIENCE SIERRA™ EVEROLIMUS ELUTING CORONARY STENT SYSTEM 3.00 MM X 18 MM / RAPID-EXCHANGE
Type: IMPLANTABLE DEVICE | Status: FUNCTIONAL
Brand: XIENCE SIERRA™

## 2022-05-05 DEVICE — STENT RONYX30018UX RESOLUTE ONYX 3.00X18
Type: IMPLANTABLE DEVICE | Status: FUNCTIONAL
Brand: RESOLUTE ONYX™

## 2022-05-05 RX ORDER — EPTIFIBATIDE 0.75 MG/ML
2 INJECTION, SOLUTION INTRAVENOUS CONTINUOUS
Status: ACTIVE | OUTPATIENT
Start: 2022-05-05 | End: 2022-05-05

## 2022-05-05 RX ORDER — EPTIFIBATIDE 2 MG/ML
INJECTION, SOLUTION INTRAVENOUS AS NEEDED
Status: DISCONTINUED | OUTPATIENT
Start: 2022-05-05 | End: 2022-05-05 | Stop reason: HOSPADM

## 2022-05-05 RX ORDER — HEPARIN SODIUM 200 [USP'U]/100ML
INJECTION, SOLUTION INTRAVENOUS
Status: COMPLETED | OUTPATIENT
Start: 2022-05-05 | End: 2022-05-05

## 2022-05-05 RX ORDER — VERAPAMIL HYDROCHLORIDE 2.5 MG/ML
INJECTION, SOLUTION INTRAVENOUS AS NEEDED
Status: DISCONTINUED | OUTPATIENT
Start: 2022-05-05 | End: 2022-05-05 | Stop reason: HOSPADM

## 2022-05-05 RX ORDER — MIDAZOLAM HYDROCHLORIDE 1 MG/ML
INJECTION, SOLUTION INTRAMUSCULAR; INTRAVENOUS AS NEEDED
Status: DISCONTINUED | OUTPATIENT
Start: 2022-05-05 | End: 2022-05-05 | Stop reason: HOSPADM

## 2022-05-05 RX ORDER — SODIUM CHLORIDE 9 MG/ML
42 INJECTION, SOLUTION INTRAVENOUS CONTINUOUS
Status: DISPENSED | OUTPATIENT
Start: 2022-05-05 | End: 2022-05-05

## 2022-05-05 RX ORDER — FENTANYL CITRATE 50 UG/ML
INJECTION, SOLUTION INTRAMUSCULAR; INTRAVENOUS AS NEEDED
Status: DISCONTINUED | OUTPATIENT
Start: 2022-05-05 | End: 2022-05-05 | Stop reason: HOSPADM

## 2022-05-05 RX ORDER — HEPARIN SODIUM 1000 [USP'U]/ML
INJECTION, SOLUTION INTRAVENOUS; SUBCUTANEOUS AS NEEDED
Status: DISCONTINUED | OUTPATIENT
Start: 2022-05-05 | End: 2022-05-05 | Stop reason: HOSPADM

## 2022-05-05 RX ORDER — SODIUM CHLORIDE 0.9 % (FLUSH) 0.9 %
5-40 SYRINGE (ML) INJECTION EVERY 8 HOURS
Status: DISCONTINUED | OUTPATIENT
Start: 2022-05-05 | End: 2022-05-06 | Stop reason: HOSPADM

## 2022-05-05 RX ORDER — EPTIFIBATIDE 0.75 MG/ML
INJECTION, SOLUTION INTRAVENOUS
Status: COMPLETED | OUTPATIENT
Start: 2022-05-05 | End: 2022-05-05

## 2022-05-05 RX ORDER — SODIUM CHLORIDE 0.9 % (FLUSH) 0.9 %
5-40 SYRINGE (ML) INJECTION AS NEEDED
Status: DISCONTINUED | OUTPATIENT
Start: 2022-05-05 | End: 2022-05-06 | Stop reason: HOSPADM

## 2022-05-05 RX ORDER — LIDOCAINE HYDROCHLORIDE 10 MG/ML
INJECTION INFILTRATION; PERINEURAL AS NEEDED
Status: DISCONTINUED | OUTPATIENT
Start: 2022-05-05 | End: 2022-05-05 | Stop reason: HOSPADM

## 2022-05-05 RX ADMIN — DOCUSATE SODIUM 100 MG: 100 CAPSULE ORAL at 20:52

## 2022-05-05 RX ADMIN — ARFORMOTEROL TARTRATE: 15 SOLUTION RESPIRATORY (INHALATION) at 20:21

## 2022-05-05 RX ADMIN — ASPIRIN 81 MG: 81 TABLET, COATED ORAL at 11:01

## 2022-05-05 RX ADMIN — INSULIN LISPRO 2 UNITS: 100 INJECTION, SOLUTION INTRAVENOUS; SUBCUTANEOUS at 22:17

## 2022-05-05 RX ADMIN — ACETAMINOPHEN 650 MG: 325 TABLET ORAL at 15:49

## 2022-05-05 RX ADMIN — ARFORMOTEROL TARTRATE: 15 SOLUTION RESPIRATORY (INHALATION) at 07:55

## 2022-05-05 RX ADMIN — SODIUM CHLORIDE, PRESERVATIVE FREE 10 ML: 5 INJECTION INTRAVENOUS at 15:00

## 2022-05-05 RX ADMIN — INSULIN LISPRO 2 UNITS: 100 INJECTION, SOLUTION INTRAVENOUS; SUBCUTANEOUS at 17:32

## 2022-05-05 RX ADMIN — MORPHINE SULFATE 2 MG: 2 INJECTION, SOLUTION INTRAMUSCULAR; INTRAVENOUS at 13:38

## 2022-05-05 RX ADMIN — SODIUM CHLORIDE, PRESERVATIVE FREE 10 ML: 5 INJECTION INTRAVENOUS at 22:18

## 2022-05-05 RX ADMIN — PAROXETINE HYDROCHLORIDE 40 MG: 20 TABLET, FILM COATED ORAL at 17:30

## 2022-05-05 RX ADMIN — METOPROLOL TARTRATE 12.5 MG: 25 TABLET, FILM COATED ORAL at 20:52

## 2022-05-05 RX ADMIN — TICAGRELOR 90 MG: 90 TABLET ORAL at 22:17

## 2022-05-05 RX ADMIN — ROPINIROLE HYDROCHLORIDE 5 MG: 1 TABLET, FILM COATED ORAL at 22:00

## 2022-05-05 RX ADMIN — LEVOTHYROXINE SODIUM 25 MCG: 0.03 TABLET ORAL at 06:05

## 2022-05-05 NOTE — H&P
Date of Surgery Update:  Angeles Quinones was seen and examined. History and physical has been reviewed. The patient has been examined. There have been no significant clinical changes since the completion of the originally dated History and Physical.      Patient assessed and is candidate for moderate sedation.       Signed By: Jhony Kapadia MD     May 5, 2022 11:52 AM

## 2022-05-05 NOTE — PROGRESS NOTES
Back from procedure, Swelling & hardness noted just above left radial vas. Band. Manual pressure held per [facility]  cath lab. Maynor RCIS at bedside. Applied 2nd TR band above first one. C/o of severe pain left wrist/forearm,  Dr. Zac Zamorano called, made aware of swelling in forearm. Elizabeth Emerson at bedside, assessing left arm. 2nd TR band removed. First TR band adjusted. Swelling improving. Good n/v checks to left arm. Ice pack applied. 1345 Left forearm area soft, no further swelling or hardness. Will continue to monitor. Bruising at radial site. [address] at bedside. No swelling or hardness noted. Diet given. 1410 pain left arm improving 8/10. Daughter at bedside. 1430 Ice pack removed. No bleeding, small bruise at TR band. Pt sleeping. [address] day presciption filled, stent cards, cardiac rehab order and education about diet, cholesterol, dual anti-platelet therapy. 1536 TR band released, sterile 2x2 & Tegaderm applied with armboard in use. No bleeding or swelling. Bruised at site. 1549 medicated for left wrist ache. See mar. Ice pack in use. 65 Dr. Zac Zamorano in, assessed left radial. No bleeding or swelling. 1625 Transferred to room 334 to [facility] in stable condition.

## 2022-05-05 NOTE — DISCHARGE INSTRUCTIONS
Cardiac Catheterization/Angiography Discharge Instructions    *Check the puncture site frequently for swelling or bleeding. If you see any bleeding, lie down and apply pressure over the area with a clean town or washcloth. Notify your doctor for any redness, swelling, drainage or oozing from the puncture site. Notify your doctor for any fever or chills. *If the leg or arm with the puncture becomes cold, numb or painful, call Dr Chucho Christiansen     *Activity should be limited for the next 48 hours. Climb stairs as little as possible and avoid any stooping, bending or strenuous activity for 48 hours. No heavy lifting (anything over 10 pounds) for 1 week. *Do not drive for 24  hours. *You may resume your usual diet. Drink more fluids than usual.    *Have a responsible person drive you home and stay with you for at least 24 hours after your heart catheterization/angiography. *You may remove the bandage from your Left and Arm in 24 hours. You may shower in 24 hours. No tub baths, hot tubs or swimming for one week. Do not place any lotions, creams, powders, ointments over the puncture site for one week. You may place a clean band-aid over the puncture site each day for 5 days. Change this daily.     Patient armband removed and shredded

## 2022-05-05 NOTE — ROUTINE PROCESS
Bedside and Verbal shift change report given to Yeimi N Herb Spear  (oncoming nurse) by Crissy Kebede RN  (offgoing nurse). Report given with SBAR, Kardex, Intake/Output and Recent Results.

## 2022-05-05 NOTE — PROGRESS NOTES
CM notes patient to have left heart catheterization today. CM anticipates patient may be ready for discharge within 24-48 hours. CM to continue to monitor for recommendations from cardiology to assist with identifying any discharge planning needs. CM to continue to follow and remain available. CM spoke with patient who confirmed patient lives with her daughter and her daughter's family, does not use medical equipment and was independent in ADL's prior to admission. Patient states she last saw her PCP Dr. Camron Singletary a week ago\" and her daughter will be able to drive her home at discharge. CM notes patient had stents placed in circumflex and LAD (above previous LAD site). CM notes patient had 30 day prescription of Brelinta filled and cardiac rehab referral placed by cardiology. CM notes wrist board to remain for 24 hours, can be d/c'd tomorrow 5/6/2022 at 1500. CM to continue to monitor and remain available. Care Management Interventions  PCP Verified by CM:  Yes  Transition of Care Consult (CM Consult): Discharge Planning  Support Systems: Child(carito)  Discharge Location  Patient Expects to be Discharged to[de-identified] Home with physician follow up

## 2022-05-05 NOTE — PROGRESS NOTES
TRANSFER - OUT REPORT:    Verbal report given to Di Sharma RN(name) on Blossom Vaughn  being transferred to [address] [place] [address](unit) for routine progression of care       Report consisted of patients Situation, Background, Assessment and   Recommendations(SBAR). Information from the following report(s) SBAR, Procedure Summary, Intake/Output, MAR, Recent Results, Med Rec Status and Cardiac Rhythm sr with 1 avb was reviewed with the receiving nurse. Lines:   Peripheral IV 05/03/22 Right Antecubital (Active)   Site Assessment Clean, dry, & intact 05/05/22 0337   Phlebitis Assessment 0 05/05/22 0337   Infiltration Assessment 0 05/05/22 0337   Dressing Status Clean, dry, & intact 05/05/22 0337   Dressing Type Transparent 05/05/22 0337   Hub Color/Line Status Pink 05/05/22 0337   Alcohol Cap Used Yes 05/05/22 0337       Peripheral IV 05/04/22 Left Antecubital (Active)   Site Assessment Clean, dry, & intact 05/05/22 0337   Phlebitis Assessment 0 05/05/22 0337   Infiltration Assessment 0 05/05/22 0337   Dressing Status Clean, dry, & intact 05/05/22 0337   Dressing Type Transparent 05/05/22 0337   Hub Color/Line Status Pink 05/05/22 0337   Action Taken Blood drawn 05/04/22 0723   Alcohol Cap Used Yes 05/05/22 2006        Opportunity for questions and clarification was provided.       Patient transported with:   Monitor  Registered Nurse

## 2022-05-05 NOTE — PROGRESS NOTES
Problem: Diabetes Self-Management  Goal: *Disease process and treatment process  Description: Define diabetes and identify own type of diabetes; list 3 options for treating diabetes. Outcome: Progressing Towards Goal  Goal: *Incorporating nutritional management into lifestyle  Description: Describe effect of type, amount and timing of food on blood glucose; list 3 methods for planning meals. Outcome: Progressing Towards Goal  Goal: *Incorporating physical activity into lifestyle  Description: State effect of exercise on blood glucose levels. Outcome: Progressing Towards Goal  Goal: *Developing strategies to promote health/change behavior  Description: Define the ABC's of diabetes; identify appropriate screenings, schedule and personal plan for screenings. Outcome: Progressing Towards Goal  Goal: *Using medications safely  Description: State effect of diabetes medications on diabetes; name diabetes medication taking, action and side effects. Outcome: Progressing Towards Goal  Goal: *Monitoring blood glucose, interpreting and using results  Description: Identify recommended blood glucose targets  and personal targets. Outcome: Progressing Towards Goal  Goal: *Prevention, detection, treatment of acute complications  Description: List symptoms of hyper- and hypoglycemia; describe how to treat low blood sugar and actions for lowering  high blood glucose level. Outcome: Progressing Towards Goal  Goal: *Prevention, detection and treatment of chronic complications  Description: Define the natural course of diabetes and describe the relationship of blood glucose levels to long term complications of diabetes.   Outcome: Progressing Towards Goal  Goal: *Developing strategies to address psychosocial issues  Description: Describe feelings about living with diabetes; identify support needed and support network  Outcome: Progressing Towards Goal  Goal: *Insulin pump training  Outcome: Progressing Towards Goal  Goal: *Sick day guidelines  Outcome: Progressing Towards Goal  Goal: *Patient Specific Goal (EDIT GOAL, INSERT TEXT)  Outcome: Progressing Towards Goal     Problem: Patient Education: Go to Patient Education Activity  Goal: Patient/Family Education  Outcome: Progressing Towards Goal     Problem: Falls - Risk of  Goal: *Absence of Falls  Description: Document John Araujo Fall Risk and appropriate interventions in the flowsheet.   Outcome: Progressing Towards Goal  Note: Fall Risk Interventions:            Medication Interventions: Bed/chair exit alarm,Evaluate medications/consider consulting pharmacy,Patient to call before getting OOB,Teach patient to arise slowly         History of Falls Interventions: Bed/chair exit alarm,Consult care management for discharge planning,Door open when patient unattended,Evaluate medications/consider consulting pharmacy,Investigate reason for fall,Utilize gait belt for transfer/ambulation,Assess for delayed presentation/identification of injury for 48 hrs (comment for end date),Vital signs minimum Q4HRs X 24 hrs (comment for end date)         Problem: Patient Education: Go to Patient Education Activity  Goal: Patient/Family Education  Outcome: Progressing Towards Goal

## 2022-05-05 NOTE — PROGRESS NOTES
0535 The PTT is therapeutic with the heparin gtt. No changes needed. Lab Results   Component Value Date/Time    aPTT 90.4 (H) 05/05/2022 04:52 AM     [id number] Shift Summary: Pt rested well overnight with no complaints. No new clinical concerns noted. She has been NPO after MN for the planned cardiac cath.      Nightshift Chart Audit Completed

## 2022-05-05 NOTE — PROGRESS NOTES
This nurse received post cath report from Melania Staley RN. Patient to report to floor after being seen by MD. Information and update as follows:    L Radial cath site  2 stent locations: Circumflex and LAD (above previous LAD site)  Brelinta started loading dose of 180mg. Med to be at patient bedside. Rx for 30 day at bedside. IV fluid: Integralin @ 2mcg/kg/min and NS @42ml/hr BOTH to d/c at [address] [facility]. Left wrist on armboard to d/c in 24 hrs, 5/6/22 @1500.

## 2022-05-05 NOTE — PROCEDURES
[facility], Coronary angiogram and PCI of pLAD and pLCx done via left radial approach. Coronary angiogram revealed critical two vessel disease. PCI of pLAD and pLCx done with ALANIS with good result. LV gram was not done. LVEDP 27 mm hg. No significant gradient on pull back. Patient tolerated procedure well. Scant blood loss. No complications. No specimen removed. Recommendation:    Medication considered:   Aspirin, beta blocker, ACEI, Nitrates and statin   Brilinta 180 mg one dose followed by 90 mg twice a day  Dual antiplatelets for 12 months due to NSTEMI and ALANIS  Integrilin for 6 hours  CAD risk factor education  Diet education  Control cholesterol  Blood pressure control  Exercise education: Age and functional status appropriate.   Cardiac rehab referral done

## 2022-05-06 VITALS
OXYGEN SATURATION: 94 % | HEART RATE: 70 BPM | DIASTOLIC BLOOD PRESSURE: 56 MMHG | SYSTOLIC BLOOD PRESSURE: 155 MMHG | HEIGHT: 61 IN | BODY MASS INDEX: 38.17 KG/M2 | WEIGHT: 202.16 LBS | TEMPERATURE: 98 F | RESPIRATION RATE: 16 BRPM

## 2022-05-06 LAB
APTT PPP: 26.7 SEC (ref 23–36.4)
ATRIAL RATE: 69 BPM
BASOPHILS # BLD: 0 K/UL (ref 0–0.1)
BASOPHILS NFR BLD: 1 % (ref 0–2)
CALCULATED P AXIS, ECG09: 56 DEGREES
CALCULATED R AXIS, ECG10: 63 DEGREES
CALCULATED T AXIS, ECG11: 62 DEGREES
DIAGNOSIS, 93000: NORMAL
DIFFERENTIAL METHOD BLD: ABNORMAL
EOSINOPHIL # BLD: 0.1 K/UL (ref 0–0.4)
EOSINOPHIL NFR BLD: 1 % (ref 0–5)
ERYTHROCYTE [DISTWIDTH] IN BLOOD BY AUTOMATED COUNT: 14.3 % (ref 11.6–14.5)
GLUCOSE BLD STRIP.AUTO-MCNC: 135 MG/DL (ref 70–110)
GLUCOSE BLD STRIP.AUTO-MCNC: 146 MG/DL (ref 70–110)
GLUCOSE BLD STRIP.AUTO-MCNC: 165 MG/DL (ref 70–110)
HCT VFR BLD AUTO: 37.2 % (ref 35–45)
HGB BLD-MCNC: 11.8 G/DL (ref 12–16)
IMM GRANULOCYTES # BLD AUTO: 0 K/UL (ref 0–0.04)
IMM GRANULOCYTES NFR BLD AUTO: 0 % (ref 0–0.5)
LYMPHOCYTES # BLD: 1 K/UL (ref 0.9–3.6)
LYMPHOCYTES NFR BLD: 16 % (ref 21–52)
MCH RBC QN AUTO: 27.8 PG (ref 24–34)
MCHC RBC AUTO-ENTMCNC: 31.7 G/DL (ref 31–37)
MCV RBC AUTO: 87.7 FL (ref 78–100)
MONOCYTES # BLD: 0.5 K/UL (ref 0.05–1.2)
MONOCYTES NFR BLD: 8 % (ref 3–10)
NEUTS SEG # BLD: 4.6 K/UL (ref 1.8–8)
NEUTS SEG NFR BLD: 74 % (ref 40–73)
NRBC # BLD: 0 K/UL (ref 0–0.01)
NRBC BLD-RTO: 0 PER 100 WBC
P-R INTERVAL, ECG05: 182 MS
PLATELET # BLD AUTO: 280 K/UL (ref 135–420)
PMV BLD AUTO: 9.6 FL (ref 9.2–11.8)
Q-T INTERVAL, ECG07: 424 MS
QRS DURATION, ECG06: 86 MS
QTC CALCULATION (BEZET), ECG08: 454 MS
RBC # BLD AUTO: 4.24 M/UL (ref 4.2–5.3)
VENTRICULAR RATE, ECG03: 69 BPM
WBC # BLD AUTO: 6.3 K/UL (ref 4.6–13.2)

## 2022-05-06 PROCEDURE — 94640 AIRWAY INHALATION TREATMENT: CPT

## 2022-05-06 PROCEDURE — 85025 COMPLETE CBC W/AUTO DIFF WBC: CPT

## 2022-05-06 PROCEDURE — 74011250637 HC RX REV CODE- 250/637: Performed by: FAMILY MEDICINE

## 2022-05-06 PROCEDURE — 85730 THROMBOPLASTIN TIME PARTIAL: CPT

## 2022-05-06 PROCEDURE — 74011250637 HC RX REV CODE- 250/637: Performed by: INTERNAL MEDICINE

## 2022-05-06 PROCEDURE — 82962 GLUCOSE BLOOD TEST: CPT

## 2022-05-06 PROCEDURE — 74011000250 HC RX REV CODE- 250: Performed by: INTERNAL MEDICINE

## 2022-05-06 PROCEDURE — 74011636637 HC RX REV CODE- 636/637: Performed by: FAMILY MEDICINE

## 2022-05-06 PROCEDURE — 74011000250 HC RX REV CODE- 250: Performed by: FAMILY MEDICINE

## 2022-05-06 PROCEDURE — 36415 COLL VENOUS BLD VENIPUNCTURE: CPT

## 2022-05-06 RX ORDER — NITROGLYCERIN 0.4 MG/1
0.4 TABLET SUBLINGUAL
Qty: 20 EACH | Refills: 0 | Status: SHIPPED | OUTPATIENT
Start: 2022-05-06

## 2022-05-06 RX ORDER — METOPROLOL TARTRATE 25 MG/1
12.5 TABLET, FILM COATED ORAL EVERY 12 HOURS
Qty: 60 TABLET | Refills: 0 | Status: SHIPPED | OUTPATIENT
Start: 2022-05-06

## 2022-05-06 RX ADMIN — ASPIRIN 81 MG: 81 TABLET, COATED ORAL at 10:16

## 2022-05-06 RX ADMIN — SODIUM CHLORIDE, PRESERVATIVE FREE 10 ML: 5 INJECTION INTRAVENOUS at 06:36

## 2022-05-06 RX ADMIN — ARFORMOTEROL TARTRATE: 15 SOLUTION RESPIRATORY (INHALATION) at 07:23

## 2022-05-06 RX ADMIN — METOPROLOL TARTRATE 12.5 MG: 25 TABLET, FILM COATED ORAL at 10:17

## 2022-05-06 RX ADMIN — SODIUM CHLORIDE, PRESERVATIVE FREE 10 ML: 5 INJECTION INTRAVENOUS at 14:00

## 2022-05-06 RX ADMIN — PAROXETINE HYDROCHLORIDE 40 MG: 20 TABLET, FILM COATED ORAL at 18:22

## 2022-05-06 RX ADMIN — TICAGRELOR 90 MG: 90 TABLET ORAL at 12:31

## 2022-05-06 RX ADMIN — DOCUSATE SODIUM 100 MG: 100 CAPSULE ORAL at 10:16

## 2022-05-06 RX ADMIN — LEVOTHYROXINE SODIUM 25 MCG: 0.03 TABLET ORAL at 06:36

## 2022-05-06 RX ADMIN — INSULIN LISPRO 2 UNITS: 100 INJECTION, SOLUTION INTRAVENOUS; SUBCUTANEOUS at 12:31

## 2022-05-06 NOTE — PROGRESS NOTES
Cardiology Progress Note        Patient: Mary Barrios        Sex: female          DOA: 5/3/2022  YOB: 1955      Age:  77 y.o.        LOS:  LOS: 2 days      Patient seen and examined, chart reviewed. Assessment/Plan     Patient Active Problem List   Diagnosis Code    DM II (diabetes mellitus, type II), controlled ([facility] [address] E11.9    Generalized anxiety disorder F41.1    Coronary artery disease involving native coronary artery I25.10    Non-STEMI (non-ST elevated myocardial infarction) ([facility] [address] I21.4    HTN (hypertension) I10    Hx of heart artery stent Z95.5      Echocardiogram revealed      Left Ventricle: The EF by visual approximation is 55 - 60%. Left ventricle size is normal. Mildly increased wall thickness. Findings consistent with mild concentric hypertrophy. Normal wall motion. Grade I diastolic dysfunction.   Mild tricuspid regurgitation. Estimated PASP 31 mm hg      Cardiac catheterization done yesterday    Left Main   Left main is short. Left main has luminal irregularities. Left main bifurcates in LAD and circumflex   Left Anterior Descending   Prox LAD lesion, 80% stenosed. Lesion is the culprit lesion. The lesion is type B2 and eccentric. The lesion is mildly calcified. The lesion was not previously treated. There is severe plaque burden detected. The plaque feature of this lesion is eccentric. LAD has luminal irregularities. Proximal to mid LAD has a patent stent. PCI was done in April 2016. PCI was done with Xience 3.0X12 mm and 2.75X8 mm stent in overlapping fashion. There is 80% stenosis just proximal to proximal edge of the stent. Mid LAD has 50% stenosis. Left Circumflex   Prox Cx lesion, 90% stenosed. Diagnostic coronary angiography shows negative for . Lesion is the culprit lesion. The lesion is type B1 and eccentric. The lesion was not previously treated. There is severe plaque burden detected. The plaque feature of this lesion is eccentric. Circumflex is non dominant vessel. Proximal circumflex as a centric 90% stenosis. OM1 is small to medium caliber, OM 2 is small caliber and OM3 is small to medium caliber vessel with luminal irregularities. Right Coronary Artery   RCA is dominant vessel. RCA has luminal irregularities. Proximal RCA has 30% stenosis     Intervention      Prox LAD lesion   Angioplasty   Angioplasty using a standard balloon was performed prior to stent deployment. The balloon used was a CATH BLLN DIL 2.5 X12MM RX -- EUPHORA. Lesion complication(s): no complications. Stent   A single stent was placed. Drug-eluting stent was successfully placed. The stent used was a STENT SYS COR 3.0X18MM -- XIENCE TOMI. The strut is well apposed. Angioplasty   Angioplasty using a standard balloon was performed following stent deployment. The balloon used was a CATH HAYDEE BLLN DIL 3.5X15MM -- NC EUPHORA. Lesion complication(s): no complications. Post-Intervention Lesion Assessment   The intervention was successful. The guidewire crossed the lesion. The pre-interventional distal flow is normal (ALETHEA 3). Post-intervention ALETHEA flow is 3. There were no complications. There is a 0% residual stenosis post intervention. Prox Cx lesion   Angioplasty   Angioplasty using a standard balloon was performed prior to stent deployment. The balloon used was a CATH BLLN DIL 2.5 X12MM RX -- EUPHORA. Lesion complication(s): no complications. Stent   A single stent was placed. Drug-eluting stent was successfully placed. The stent used was a STENT COR 3X18MM RESOLUTE TISHA RX ALANIS. The strut is well apposed. Angioplasty   Angioplasty using a standard balloon was performed following stent deployment. The balloon used was a CATH HAYDEE BLLN DIL 3.0X12MM -- NC EUPHORA. Lesion complication(s): no complications. Post-Intervention Lesion Assessment   The intervention was successful. The guidewire crossed the lesion. Device was deployed.  The pre-interventional distal flow is normal (ALETHEA 3). Post-intervention ALETHEA flow is 3. There were no complications. There is a 0% residual stenosis post intervention. Left Heart    Left Ventricle LV gram was not done. LVEDP 27 mm hg. No significant gradient on pull back. Plan:    Continue aspirin, Brilinta, and beta-blocker. Patient is statin intolerant. She tried multiple statin in the past but due to myalgia and joint pain did for was discontinued. Patient is candidate for injectable cholesterol medication like Repatha. Patient will discuss for cardiologist as an outpatient. Advised to see cardiologist in 2-4 weeks. Patient was given 1 month of supply of Brilinta. Dual antiplatelet therapy for at least 12 months due to non-STEMI and use of drug-eluting stent  No weight lifting no more than 10 lb from left hand for 6 days  Plan discussed with patient and family member at bedside  Plan discussed with               Subjective:    cc:   denies any chest pain or shortness of breath. REVIEW OF SYSTEMS:     General: No fevers or chills. Cardiovascular: No chest pain,No palpitations, No orthopnea, No PND, No leg swelling, No claudication  Pulmonary: No shortness of breath. Gastrointestinal: No nausea, vomiting, bleeding  Neurology: No Dizziness    Objective:      Visit Vitals  BP (!) 155/56   Pulse 70   Temp 98 °F (36.7 °C)   Resp 16   Ht 5' 1\" (1.549 m)   Wt 91.7 kg (202 lb 2.6 oz)   SpO2 94%   Breastfeeding No   BMI 38.20 kg/m²     Body mass index is 38.2 kg/m². Physical Exam:  General Appearance: Comfortable, not using accessory muscles of respiration. HEENT: PAT. HEAD: Atraumatic  NECK: No JVD, no thyroidomeglay. CAROTIDS: No bruit  LUNGS: Clear bilaterally. HEART: S1+S2 audible, no murmur, no pericardial rub. ABD: Non-tender, BS Audible    EXT: No edema, and no cyanosis.   Left wrist: No swelling, no hematoma, no ecchymosis, no tenderness, left radial pulse +, normal motor and sensor exam of left hand   VASCULAR EXAM: Pulses are intact. PSYCHIATRIC EXAM: Mood is appropriate. MUSCULOSKELETAL: Grossly no joint deformity. NEUROLOGICAL: AAO times 3, No motor and sensory deficit    Medication:  Current Facility-Administered Medications   Medication Dose Route Frequency    ticagrelor (BRILINTA) tablet 90 mg  90 mg Oral Q12H    sodium chloride (NS) flush 5-40 mL  5-40 mL IntraVENous Q8H    sodium chloride (NS) flush 5-40 mL  5-40 mL IntraVENous PRN    magnesium hydroxide (MILK OF MAGNESIA) 400 mg/5 mL oral suspension 30 mL  30 mL Oral DAILY PRN    docusate sodium (COLACE) capsule 100 mg  100 mg Oral BID    acetaminophen (TYLENOL) tablet 650 mg  650 mg Oral Q6H PRN    HYDROcodone-acetaminophen (NORCO) 5-325 mg per tablet 1 Tablet  1 Tablet Oral Q6H PRN    morphine injection 2 mg  2 mg IntraVENous Q4H PRN    nitroglycerin (NITROSTAT) tablet 0.4 mg  0.4 mg SubLINGual Q5MIN PRN    ondansetron (ZOFRAN) injection 4 mg  4 mg IntraVENous Q4H PRN    melatonin tablet 3 mg  3 mg Oral QHS PRN    insulin lispro (HUMALOG) injection   SubCUTAneous AC&HS    glucose chewable tablet 16 g  4 Tablet Oral PRN    glucagon (GLUCAGEN) injection 1 mg  1 mg IntraMUSCular PRN    dextrose 10% infusion 0-250 mL  0-250 mL IntraVENous PRN    metoprolol tartrate (LOPRESSOR) tablet 12.5 mg  12.5 mg Oral Q12H    arformoterol 15 mcg/budesonide 0.5 mg neb solution   Nebulization BID RT    PARoxetine (PAXIL) tablet 40 mg  40 mg Oral QPM    levothyroxine (SYNTHROID) tablet 25 mcg  25 mcg Oral ACB    rOPINIRole (REQUIP) tablet 5 mg  5 mg Oral QHS    aspirin delayed-release tablet 81 mg  81 mg Oral DAILY               Lab/Data Reviewed:       Recent Labs     05/06/22  0320 05/05/22  1230 05/05/22  0452 05/04/22  0339 05/04/22  0339   WBC 6.3  --  7.4  --  6.0   HGB 11.8* 12.9 11.8*   < > 12.2   HCT 37.2 39.5 37.4   < > 38.6     --  261  --  290    < > = values in this interval not displayed.      Recent Labs 05/05/22  0452 05/04/22  0339 05/03/22  2100    142 140   K 4.1 3.9 4.7    111 109   CO2 28 26 27   * 147* 178*   BUN 13 17 20*   CREA 0.73 0.79 0.87   CA 8.7 8.4* 8.6       Signed By: Anurag Ramirez MD     May 6, 2022

## 2022-05-06 NOTE — DISCHARGE SUMMARY
Discharge Summary    Patient: Myriam Richter MRN: [id number]  CSN: [id number]    YOB: 1955  Age: 77 y.o. Sex: female    DOA: 5/3/2022 LOS:  LOS: 2 days   Discharge Date:      Primary Care Provider:  Darron Saldivar MD    Admission Diagnoses: Non-STEMI (non-ST elevated myocardial infarction) [facility]) [I21.4]    Discharge Diagnoses:    Problem List as of 5/6/2022 Never Reviewed          Codes Class Noted - Resolved    * (Principal) Non-STEMI (non-ST elevated myocardial infarction) ([facility] ICD-10-CM: I21.4  ICD-9-CM: 410.70  5/4/2022 - Present        HTN (hypertension) ICD-10-CM: I10  ICD-9-CM: 401.9  5/4/2022 - Present        Hx of heart artery stent ICD-10-CM: Z95.5  ICD-9-CM: V45.82  5/4/2022 - Present        Coronary artery disease involving native coronary artery ICD-10-CM: I25.10  ICD-9-CM: 414.01  4/15/2016 - Present    Overview Signed 4/15/2016 11:52 AM by Dalila Tenorio MD     ALANIS stent to proximal-mid left anterior descending                  DM II (diabetes mellitus, type II), controlled ([facility] ICD-10-CM: E11.9  ICD-9-CM: 250.00  4/13/2016 - Present        Generalized anxiety disorder ICD-10-CM: F41.1  ICD-9-CM: 300.02  4/13/2016 - Present        RESOLVED: Positive cardiac stress test ICD-10-CM: R94.39  ICD-9-CM: 794.39  4/14/2016 - 5/4/2022        RESOLVED: Precordial pain ICD-10-CM: R07.2  ICD-9-CM: 786.51  4/13/2016 - 5/4/2022        RESOLVED: Hypertension, accelerated ICD-10-CM: I10  ICD-9-CM: 401.0  4/13/2016 - 5/4/2022              Discharge Medications:     Current Discharge Medication List      START taking these medications    Details   metoprolol tartrate (LOPRESSOR) 25 mg tablet Take 0.5 Tablets by mouth every twelve (12) hours. Qty: 60 Tablet, Refills: 0  Start date: 5/6/2022      nitroglycerin (NITROSTAT) 0.4 mg SL tablet 1 Tablet by SubLINGual route every five (5) minutes as needed for Chest Pain. Up to 3 doses.   Qty: 20 Each, Refills: 0  Start date: 5/6/2022         CONTINUE these medications which have NOT CHANGED    Details   levothyroxine (SYNTHROID) 25 mcg tablet 25 mcg. rOPINIRole (Requip) 5 mg tablet Take 1 Tab by mouth nightly. Qty: 20 Tab, Refills: 0      polyethylene glycol (Miralax) 17 gram/dose powder Take 17 g by mouth daily. 1 tablespoon with 8 oz of water daily  Qty: 300 g, Refills: 0      cyclobenzaprine (FLEXERIL) 10 mg tablet Take 1 Tab by mouth three (3) times daily as needed for Muscle Spasm(s). Qty: 20 Tab, Refills: 0      aspirin delayed-release 81 mg tablet Take 1 Tab by mouth daily. Qty: 30 Tab, Refills: 0      ticagrelor (BRILINTA) 90 mg tablet Take 1 Tab by mouth two (2) times a day. Qty: 60 Tab, Refills: 0      metFORMIN (GLUCOPHAGE) 500 mg tablet Take 500 mg by mouth daily (with breakfast). PARoxetine (PAXIL) 40 mg tablet Take 40 mg by mouth every evening. cycloSPORINE (RESTASIS) 0.05 % ophthalmic emulsion Administer 1 Drop to both eyes every twelve (12) hours. budesonide-formoterol (SYMBICORT) 80-4.5 mcg/actuation HFAA inhaler Take 2 Puffs by inhalation two (2) times a day. albuterol (PROVENTIL HFA, VENTOLIN HFA, PROAIR HFA) 90 mcg/actuation inhaler Take 2 Puffs by inhalation every four (4) hours as needed for Wheezing. vitamin E (AQUA GEMS) 400 unit capsule Take 400 Units by mouth daily. vit a,c & e-lutein-minerals (OCUVITE) tablet 1 Tab daily. potassium 99 mg tablet Take 99 mg by mouth daily. STOP taking these medications       lisinopril-hydrochlorothiazide (PRINZIDE, ZESTORETIC) 10-12.5 mg per tablet Comments:   Reason for Stopping:               Discharge Condition: Good    Procedures : coronary angiogram with PCI    Consults: Cardiology      PHYSICAL EXAM   Visit Vitals  BP (!) 155/56   Pulse 70   Temp 98 °F (36.7 °C)   Resp 16   Ht 5' 1\" (1.549 m)   Wt 91.7 kg (202 lb 2.6 oz)   SpO2 94%   Breastfeeding No   BMI 38.20 kg/m²     General: Awake, cooperative, no acute distress    HEENT: NC, Atraumatic.   MAYO, EOMI. Anicteric sclerae. Lungs:  CTA Bilaterally. No Wheezing/Rhonchi/Rales. Heart:  Regular  rhythm,  No murmur, No Rubs, No Gallops  Abdomen: Soft, Non distended, Non tender. +Bowel sounds,   Extremities: No c/c/e  Psych:   Not anxious or agitated. Neurologic:  No acute neurological deficits. Admission HPI :   Fortunato Reddy is a 77 y.o. female who has a history of hypertension, hyperlipidemia and diabetes as well as coronary artery disease with 2 stents placed and is followed by Dr. Nick Carolina. She presents to the emergency room complaining of 3 days of chest pain/chest pressure, shortness of breath that is worse with exertion. She stated the pain got really bad today when she decided to go for a walk in her neighborhood and became very short of breath with lots of chest pressure radiating into her left shoulder and left arm and had to actually come back and sit down for it to get better. Associated history is osteoarthritis in both of her knees. She is followed by Dr. Blanca Hernandez and is getting gel injections. Hospital Course :   Ms. April Velasco was admitted to monitored floor, she was seen and followed by cardiology. NSTEMI-  S/p coronary angiogram with critical two-vessel disease. Status post PCI of LAD and left circumflex. Cardiology continued on her aspirin, started on Brilinta. 1 month samples given. Also started on metoprolol tartrate. She is allergic to statin. Hence not started. Hypertension-  Controlled. DM-  Placed on sliding scale insulin.     Activity: Activity as tolerated    Diet: Diabetic Diet    Follow-up: PCP, cardiology    Disposition: Home    Minutes spent on discharge: 45      Labs: Results:       Chemistry Recent Labs     05/05/22  0452 05/04/22  0339 05/03/22  2100   * 147* 178*    142 140   K 4.1 3.9 4.7    111 109   CO2 28 26 27   BUN 13 17 20*   CREA 0.73 0.79 0.87   CA 8.7 8.4* 8.6   AGAP 4 5 4   BUCR 18 22* 23*   AP 68 79 92 TP 6.4 6.9 6.8   ALB 3.3* 3.3* 3.4   GLOB 3.1 3.6 3.4   AGRAT 1.1 0.9 1.0      CBC w/Diff Recent Labs     05/06/22  0320 05/05/22  1230 05/05/22  0452 05/04/22  0339 05/04/22  0339 05/03/22  2100 05/03/22 2100   WBC 6.3  --  7.4  --  6.0   < > 6.4   RBC 4.24  --  4.24  --  4.40   < > 4.39   HGB 11.8* 12.9 11.8*   < > 12.2   < > 12.3   HCT 37.2 39.5 37.4   < > 38.6   < > 38.2     --  261  --  290   < > 272   GRANS 74*  --  73  --   --   --  66   LYMPH 16*  --  19*  --   --   --  24   EOS 1  --  1  --   --   --  2    < > = values in this interval not displayed. Cardiac Enzymes No results for input(s): CPK, CKND1, KARLEE in the last 72 hours. No lab exists for component: CKRMB, TROIP   Coagulation Recent Labs     05/06/22  0320 05/05/22 0452 05/04/22  0610 05/04/22  0339   PTP  --  12.9  --  13.5   INR  --  0.9  --  1.0   APTT 26.7 90.4*   < >  --     < > = values in this interval not displayed. Lipid Panel Lab Results   Component Value Date/Time    Cholesterol, total 205 (H) 05/04/2022 03:39 AM    HDL Cholesterol 57 05/04/2022 03:39 AM    LDL, calculated 123.6 (H) 05/04/2022 03:39 AM    VLDL, calculated 24.4 05/04/2022 03:39 AM    Triglyceride 122 05/04/2022 03:39 AM    CHOL/HDL Ratio 3.6 05/04/2022 03:39 AM      BNP No results for input(s): BNPP in the last 72 hours. Liver Enzymes Recent Labs     05/05/22 0452   TP 6.4   ALB 3.3*   AP 68      Thyroid Studies Lab Results   Component Value Date/Time    TSH 3.70 05/04/2022 03:39 AM            Significant Diagnostic Studies: XR CHEST PORT    Result Date: 5/4/2022  EXAM: XR CHEST PORT. CLINICAL INDICATION/HISTORY: chest pain. -Additional: None.  TECHNIQUE: A portable erect AP radiographic view of the chest. COMPARISON: Radiograph of 03/22/2021. _______________ FINDINGS: Lungs and pleural spaces: > Mild central vascular and diffuse interstitial prominence, suggestive of mild pulmonary edema. > No pneumothorax or appreciable significant pleural effusion. Cardiomediastinal silhouette: > Mild prominence of the cardiac silhouette may be entirely artifactual secondary to AP portable technique. Bones: > No acute findings. Other: > None. _______________     Findings suggestive of mild pulmonary edema. _______________     ECHO ADULT COMPLETE    Result Date: 5/4/2022    Left Ventricle: The EF by visual approximation is 55 - 60%. Left ventricle size is normal. Mildly increased wall thickness. Findings consistent with mild concentric hypertrophy. Normal wall motion. Grade I diastolic dysfunction.   Mild tricuspid regurgitation. Estimated PASP 31 mm hg     CARDIAC PROCEDURE    Result Date: 5/6/2022  Risks, benefits, and alternatives and complications of procedure were explained to the patient and appropriate informed consent was obtained prior to the procedure. The patient was brought to the cath lab and was prepped and draped in the usual sterile manner. Moderate sedation was achieved with the appropriate medications. Lidocaine was used to secure local anesthesia. The Left radial artery was cannulated using a modified Seldinger technique and a 6F Divehi sheath was placed. LHC was performed using 5F JR 3.5 catheter under fluoroscopic guidance. After obtaining Left heart pressure catheter was pull back. RCA was cannulated with JR 3.5 catheter. RCA angiogram was done in different views. Catheter was exchanged over 0.035 inch J tip guide wire. 5F FL3.5 catheter was advanced under fluoroscopic guidance and Left main was cannulated. Angiogram of left coronary artery was performed in different views. Catheter was exchanged over 0.035 inch J tip guide wire. EBU 3 advanced under fluroscopic guidance and Left main was cannulated. After achieving therapeutic aPTT The LCx lesion was crossed with run through wire. Lesion was pre dilated with 2.5X12 mm pre dil balloon and PCI done with Resolute 3.0X18 mm stent and post dilated with 3.0X12 mm post dil ballon with good result. The run through wire was pulled from LCx and introduced in LAD. The LAD lesion was crossed with run through wire. Lesion was pre dilated with 2.5X12 mm pre dil balloon and PCI done with Xience 3.0X18 mm stent and post dilated with 3.5X15 mm post dil ballon with good result. Post procedure angiogram done. Guide wire was removed. EBU was removed over J wire. Radial band applied and radial sheath was removed and patient transferred to recovery area. [facility], Coronary angiogram and PCI of proximal LAD and proximal LCx done via left radial approach. Coronary anatomy described below with noted coronary artery disease. PCI of pLAD and pLCx done with ALANIS with good result. LV gram was not done. LVEDP 27 mm hg. No significant gradient on pull back. Patient tolerated procedure well. Scant blood loss. No complications. No specimen removed. Recommendation: Medication considered: Aspirin, beta blocker, ACEI, Nitrates and statin Brilinta 180 mg one dose followed by 90 mg twice a day Integrilin for 6 hours Dual antiplatelet therapy for at least 12 months due to non-STEMI and use of drug-eluting stent CAD risk factor education Diet education Control cholesterol Blood pressure control Exercise education: Age and functional status appropriate. Cardiac rehab referral done        No results found for this or any previous visit. Please note that this dictation was completed with [email or web address], the computer voice recognition software. Quite often unanticipated grammatical, syntax, homophones, and other interpretive errors are inadvertently transcribed by the computer software. Please disregard these errors. Please excuse any errors that have escaped final proofreading.      CC: Ross Castanon MD

## 2022-05-06 NOTE — PROGRESS NOTES
Hospitalist Progress Note    Patient: Pipo Tobin MRN: [id number]  CSN: [id number]    YOB: 1955  Age: 77 y.o. Sex: female    DOA: 5/3/2022 LOS:  LOS: 1 day                Assessment/Plan     Patient Active Problem List   Diagnosis Code    DM II (diabetes mellitus, type II), controlled ([facility] E11.9    Generalized anxiety disorder F41.1    Coronary artery disease involving native coronary artery I25.10    Non-STEMI (non-ST elevated myocardial infarction) ([facility] I21.4    HTN (hypertension) I10    Hx of heart artery stent Z95.5        Chief complaint :  Chest pain  77 y.o. female who has a history of hypertension, hyperlipidemia and diabetes as well as coronary artery disease with 2 stents placed and is followed by Dr. Kailash Julio. She presents to the emergency room complaining of 3 days of chest pain/chest pressure, shortness of breath that is worse with exertion. Admitted for NSTEMI.      NSTEMI   S/p coronary angiogram with critical 2 vessel disease, PCI of LAD and LCx. Continue with medications per cardiology    HTN -  Controlled    DM -   On SSI    Discussed with Dr. Alfonzo Gagnon    Disposition : tomorrow    Review of systems  General: No fevers or chills. Cardiovascular: No chest pain or pressure. No palpitations. Pulmonary: No shortness of breath. Gastrointestinal: No nausea, vomiting. Physical Exam:  General: Awake, cooperative, no acute distress    HEENT: NC, Atraumatic. PERRLA, anicteric sclerae. Lungs: CTA Bilaterally. No Wheezing/Rhonchi/Rales. Heart:  S1 S2,  No murmur, No Rubs, No Gallops  Abdomen: Soft, Non distended, Non tender.  +Bowel sounds,   Extremities: No c/c/e  Psych:   Not anxious or agitated. Neurologic:  No acute neurological deficit.            Vital signs/Intake and Output:  Visit Vitals  BP (!) 126/51   Pulse 73   Temp 98.2 °F (36.8 °C)   Resp 14   Ht 5' 1\" (1.549 m)   Wt 91.7 kg (202 lb 2.6 oz)   SpO2 97%   Breastfeeding No   BMI 38.20 kg/m²     Current Shift:  No intake/output data recorded. Last three shifts:  05/04 0701 - 05/05 1900  In: 480 [P.O.:480]  Out: -             Labs: Results:       Chemistry Recent Labs     05/05/22 0452 05/04/22 0339 05/03/22 2100   * 147* 178*    142 140   K 4.1 3.9 4.7    111 109   CO2 28 26 27   BUN 13 17 20*   CREA 0.73 0.79 0.87   CA 8.7 8.4* 8.6   AGAP 4 5 4   BUCR 18 22* 23*   AP 68 79 92   TP 6.4 6.9 6.8   ALB 3.3* 3.3* 3.4   GLOB 3.1 3.6 3.4   AGRAT 1.1 0.9 1.0      CBC w/Diff Recent Labs     05/05/22  1230 05/05/22 0452 05/04/22 0339 05/03/22 2100 05/03/22  2100   WBC  --  7.4 6.0  --  6.4   RBC  --  4.24 4.40  --  4.39   HGB 12.9 11.8* 12.2   < > 12.3   HCT 39.5 37.4 38.6   < > 38.2   PLT  --  261 290  --  272   GRANS  --  73  --   --  66   LYMPH  --  19*  --   --  24   EOS  --  1  --   --  2    < > = values in this interval not displayed. Cardiac Enzymes No results for input(s): CPK, CKND1, KARLEE in the last 72 hours. No lab exists for component: CKRMB, TROIP   Coagulation Recent Labs     05/05/22 0452 05/04/22  1822 05/04/22 0610 05/04/22 0339   PTP 12.9  --   --  13.5   INR 0.9  --   --  1.0   APTT 90.4* 88.4*   < >  --     < > = values in this interval not displayed. Lipid Panel Lab Results   Component Value Date/Time    Cholesterol, total 205 (H) 05/04/2022 03:39 AM    HDL Cholesterol 57 05/04/2022 03:39 AM    LDL, calculated 123.6 (H) 05/04/2022 03:39 AM    VLDL, calculated 24.4 05/04/2022 03:39 AM    Triglyceride 122 05/04/2022 03:39 AM    CHOL/HDL Ratio 3.6 05/04/2022 03:39 AM      BNP No results for input(s): BNPP in the last 72 hours.    Liver Enzymes Recent Labs     05/05/22  0452   TP 6.4   ALB 3.3*   AP 68      Thyroid Studies Lab Results   Component Value Date/Time    TSH 3.70 05/04/2022 03:39 AM        Procedures/imaging: see electronic medical records for all procedures/Xrays and details which were not copied into this note but were reviewed prior to creation of Plan

## 2022-05-22 ENCOUNTER — APPOINTMENT (OUTPATIENT)
Dept: CT IMAGING | Age: 67
DRG: 175 | End: 2022-05-22
Attending: EMERGENCY MEDICINE
Payer: MEDICARE

## 2022-05-22 ENCOUNTER — APPOINTMENT (OUTPATIENT)
Dept: GENERAL RADIOLOGY | Age: 67
DRG: 175 | End: 2022-05-22
Attending: EMERGENCY MEDICINE
Payer: MEDICARE

## 2022-05-22 ENCOUNTER — HOSPITAL ENCOUNTER (INPATIENT)
Age: 67
LOS: 1 days | Discharge: HOME | DRG: 175 | End: 2022-05-24
Attending: EMERGENCY MEDICINE | Admitting: INTERNAL MEDICINE
Payer: MEDICARE

## 2022-05-22 DIAGNOSIS — I26.94 MULTIPLE SUBSEGMENTAL PULMONARY EMBOLI WITHOUT ACUTE COR PULMONALE (HCC): ICD-10-CM

## 2022-05-22 DIAGNOSIS — N30.00 ACUTE CYSTITIS WITHOUT HEMATURIA: ICD-10-CM

## 2022-05-22 DIAGNOSIS — G25.81 RESTLESS LEG SYNDROME: Primary | ICD-10-CM

## 2022-05-22 LAB
ALBUMIN SERPL-MCNC: 3.3 G/DL (ref 3.4–5)
ALBUMIN/GLOB SERPL: 1 {RATIO} (ref 0.8–1.7)
ALP SERPL-CCNC: 111 U/L (ref 45–117)
ALT SERPL-CCNC: 36 U/L (ref 13–56)
ANION GAP SERPL CALC-SCNC: 8 MMOL/L (ref 3–18)
APPEARANCE UR: CLEAR
APTT PPP: 25.1 SEC (ref 23–36.4)
AST SERPL-CCNC: 31 U/L (ref 10–38)
BACTERIA URNS QL MICRO: ABNORMAL /HPF
BASOPHILS # BLD: 0.1 K/UL (ref 0–0.1)
BASOPHILS NFR BLD: 1 % (ref 0–2)
BILIRUB SERPL-MCNC: 0.3 MG/DL (ref 0.2–1)
BILIRUB UR QL: NEGATIVE
BNP SERPL-MCNC: 33 PG/ML (ref 0–900)
BUN SERPL-MCNC: 22 MG/DL (ref 7–18)
BUN/CREAT SERPL: 25 (ref 12–20)
CALCIUM SERPL-MCNC: 8.7 MG/DL (ref 8.5–10.1)
CHLORIDE SERPL-SCNC: 105 MMOL/L (ref 100–111)
CK SERPL-CCNC: 138 U/L (ref 26–192)
CO2 SERPL-SCNC: 25 MMOL/L (ref 21–32)
COLOR UR: YELLOW
CREAT SERPL-MCNC: 0.89 MG/DL (ref 0.6–1.3)
DIFFERENTIAL METHOD BLD: ABNORMAL
EOSINOPHIL # BLD: 0.2 K/UL (ref 0–0.4)
EOSINOPHIL NFR BLD: 2 % (ref 0–5)
EPITH CASTS URNS QL MICRO: ABNORMAL /LPF (ref 0–5)
ERYTHROCYTE [DISTWIDTH] IN BLOOD BY AUTOMATED COUNT: 13.6 % (ref 11.6–14.5)
GLOBULIN SER CALC-MCNC: 3.4 G/DL (ref 2–4)
GLUCOSE SERPL-MCNC: 205 MG/DL (ref 74–99)
GLUCOSE UR STRIP.AUTO-MCNC: NEGATIVE MG/DL
HCT VFR BLD AUTO: 37.2 % (ref 35–45)
HGB BLD-MCNC: 12.3 G/DL (ref 12–16)
HGB UR QL STRIP: NEGATIVE
IMM GRANULOCYTES # BLD AUTO: 0 K/UL (ref 0–0.04)
IMM GRANULOCYTES NFR BLD AUTO: 0 % (ref 0–0.5)
INR PPP: 0.8 (ref 0.8–1.2)
KETONES UR QL STRIP.AUTO: NEGATIVE MG/DL
LEUKOCYTE ESTERASE UR QL STRIP.AUTO: ABNORMAL
LYMPHOCYTES # BLD: 1.5 K/UL (ref 0.9–3.6)
LYMPHOCYTES NFR BLD: 19 % (ref 21–52)
MCH RBC QN AUTO: 28.3 PG (ref 24–34)
MCHC RBC AUTO-ENTMCNC: 33.1 G/DL (ref 31–37)
MCV RBC AUTO: 85.5 FL (ref 78–100)
MONOCYTES # BLD: 0.7 K/UL (ref 0.05–1.2)
MONOCYTES NFR BLD: 8 % (ref 3–10)
NEUTS SEG # BLD: 5.8 K/UL (ref 1.8–8)
NEUTS SEG NFR BLD: 70 % (ref 40–73)
NITRITE UR QL STRIP.AUTO: NEGATIVE
NRBC # BLD: 0 K/UL (ref 0–0.01)
NRBC BLD-RTO: 0 PER 100 WBC
PH UR STRIP: 5.5 [PH] (ref 5–8)
PLATELET # BLD AUTO: 282 K/UL (ref 135–420)
PMV BLD AUTO: 9.3 FL (ref 9.2–11.8)
POTASSIUM SERPL-SCNC: 4.3 MMOL/L (ref 3.5–5.5)
PROT SERPL-MCNC: 6.7 G/DL (ref 6.4–8.2)
PROT UR STRIP-MCNC: NEGATIVE MG/DL
PROTHROMBIN TIME: 11.5 SEC (ref 11.5–15.2)
RBC # BLD AUTO: 4.35 M/UL (ref 4.2–5.3)
RBC #/AREA URNS HPF: NEGATIVE /HPF (ref 0–5)
SODIUM SERPL-SCNC: 138 MMOL/L (ref 136–145)
SP GR UR REFRACTOMETRY: 1.01 (ref 1–1.03)
TROPONIN-HIGH SENSITIVITY: 27 NG/L (ref 0–54)
UROBILINOGEN UR QL STRIP.AUTO: 0.2 EU/DL (ref 0.2–1)
WBC # BLD AUTO: 8.3 K/UL (ref 4.6–13.2)
WBC URNS QL MICRO: ABNORMAL /HPF (ref 0–5)

## 2022-05-22 PROCEDURE — 83036 HEMOGLOBIN GLYCOSYLATED A1C: CPT

## 2022-05-22 PROCEDURE — 87086 URINE CULTURE/COLONY COUNT: CPT

## 2022-05-22 PROCEDURE — 83880 ASSAY OF NATRIURETIC PEPTIDE: CPT

## 2022-05-22 PROCEDURE — 83540 ASSAY OF IRON: CPT

## 2022-05-22 PROCEDURE — 85730 THROMBOPLASTIN TIME PARTIAL: CPT

## 2022-05-22 PROCEDURE — 85025 COMPLETE CBC W/AUTO DIFF WBC: CPT

## 2022-05-22 PROCEDURE — 80053 COMPREHEN METABOLIC PANEL: CPT

## 2022-05-22 PROCEDURE — 71045 X-RAY EXAM CHEST 1 VIEW: CPT

## 2022-05-22 PROCEDURE — 99285 EMERGENCY DEPT VISIT HI MDM: CPT

## 2022-05-22 PROCEDURE — 93005 ELECTROCARDIOGRAM TRACING: CPT

## 2022-05-22 PROCEDURE — 82550 ASSAY OF CK (CPK): CPT

## 2022-05-22 PROCEDURE — 74011250636 HC RX REV CODE- 250/636: Performed by: EMERGENCY MEDICINE

## 2022-05-22 PROCEDURE — 84484 ASSAY OF TROPONIN QUANT: CPT

## 2022-05-22 PROCEDURE — 94762 N-INVAS EAR/PLS OXIMTRY CONT: CPT

## 2022-05-22 PROCEDURE — 96375 TX/PRO/DX INJ NEW DRUG ADDON: CPT

## 2022-05-22 PROCEDURE — 81001 URINALYSIS AUTO W/SCOPE: CPT

## 2022-05-22 PROCEDURE — 71275 CT ANGIOGRAPHY CHEST: CPT

## 2022-05-22 PROCEDURE — 85610 PROTHROMBIN TIME: CPT

## 2022-05-22 RX ORDER — MORPHINE SULFATE 4 MG/ML
4 INJECTION INTRAVENOUS
Status: COMPLETED | OUTPATIENT
Start: 2022-05-22 | End: 2022-05-22

## 2022-05-22 RX ORDER — ONDANSETRON 2 MG/ML
4 INJECTION INTRAMUSCULAR; INTRAVENOUS
Status: COMPLETED | OUTPATIENT
Start: 2022-05-22 | End: 2022-05-22

## 2022-05-22 RX ADMIN — MORPHINE SULFATE 4 MG: 4 INJECTION INTRAVENOUS at 23:11

## 2022-05-22 RX ADMIN — ONDANSETRON 4 MG: 2 INJECTION INTRAMUSCULAR; INTRAVENOUS at 23:11

## 2022-05-22 NOTE — Clinical Note
Status[de-identified] INPATIENT [101]   Type of Bed: Telemetry [19]   Cardiac Monitoring Required?: Yes   Inpatient Hospitalization Certified Necessary for the Following Reasons: 3.  Patient receiving treatment that can only be provided in an inpatient setting (further clarification in H&P documentation)   Admitting Diagnosis: Pulmonary edema [813893]   Admitting Physician: Mitchel Gonzales [[id number]]   Attending Physician: Mitchel Gonzales [[id number]]   Estimated Length of Stay: 3-4 Midnights   Discharge Plan[de-identified] Home with Office Follow-up

## 2022-05-23 ENCOUNTER — APPOINTMENT (OUTPATIENT)
Dept: VASCULAR SURGERY | Age: 67
DRG: 175 | End: 2022-05-23
Attending: EMERGENCY MEDICINE
Payer: MEDICARE

## 2022-05-23 ENCOUNTER — APPOINTMENT (OUTPATIENT)
Dept: NON INVASIVE DIAGNOSTICS | Age: 67
DRG: 175 | End: 2022-05-23
Attending: INTERNAL MEDICINE
Payer: MEDICARE

## 2022-05-23 PROBLEM — I26.99 PULMONARY EMBOLUS (HCC): Status: ACTIVE | Noted: 2022-05-23

## 2022-05-23 PROBLEM — J81.1 PULMONARY EDEMA: Status: ACTIVE | Noted: 2022-05-23

## 2022-05-23 PROBLEM — G25.81 RESTLESS LEG SYNDROME: Status: ACTIVE | Noted: 2022-05-23

## 2022-05-23 LAB
APTT PPP: 163.1 SEC (ref 23–36.4)
APTT PPP: 72.6 SEC (ref 23–36.4)
COVID-19 RAPID TEST, COVR: NOT DETECTED
ECHO AO ROOT DIAM: 3.4 CM
ECHO AO ROOT INDEX: 1.79 CM/M2
ECHO AV AREA PEAK VELOCITY: 1.8 CM2
ECHO AV AREA VTI: 1.8 CM2
ECHO AV AREA/BSA PEAK VELOCITY: 0.9 CM2/M2
ECHO AV AREA/BSA VTI: 0.9 CM2/M2
ECHO AV MEAN GRADIENT: 8 MMHG
ECHO AV MEAN VELOCITY: 1.3 M/S
ECHO AV PEAK GRADIENT: 15 MMHG
ECHO AV PEAK VELOCITY: 2 M/S
ECHO AV VELOCITY RATIO: 0.55
ECHO AV VTI: 41.3 CM
ECHO EST RA PRESSURE: 8 MMHG
ECHO LA DIAMETER INDEX: 2.05 CM/M2
ECHO LA DIAMETER: 3.9 CM
ECHO LA TO AORTIC ROOT RATIO: 1.15
ECHO LA VOL 2C: 30 ML (ref 22–52)
ECHO LA VOL 4C: 54 ML (ref 22–52)
ECHO LA VOL BP: 41 ML (ref 22–52)
ECHO LA VOL/BSA BIPLANE: 22 ML/M2 (ref 16–34)
ECHO LA VOLUME AREA LENGTH: 43 ML
ECHO LA VOLUME INDEX A2C: 16 ML/M2 (ref 16–34)
ECHO LA VOLUME INDEX A4C: 28 ML/M2 (ref 16–34)
ECHO LA VOLUME INDEX AREA LENGTH: 23 ML/M2 (ref 16–34)
ECHO LV E' LATERAL VELOCITY: 10 CM/S
ECHO LV E' SEPTAL VELOCITY: 8 CM/S
ECHO LV EDV A2C: 84 ML
ECHO LV EDV A4C: 93 ML
ECHO LV EDV BP: 89 ML (ref 56–104)
ECHO LV EDV INDEX A4C: 49 ML/M2
ECHO LV EDV INDEX BP: 47 ML/M2
ECHO LV EDV NDEX A2C: 44 ML/M2
ECHO LV EJECTION FRACTION A2C: 71 %
ECHO LV EJECTION FRACTION A4C: 66 %
ECHO LV EJECTION FRACTION BIPLANE: 67 % (ref 55–100)
ECHO LV ESV A2C: 25 ML
ECHO LV ESV A4C: 31 ML
ECHO LV ESV BP: 29 ML (ref 19–49)
ECHO LV ESV INDEX A2C: 13 ML/M2
ECHO LV ESV INDEX A4C: 16 ML/M2
ECHO LV ESV INDEX BP: 15 ML/M2
ECHO LV FRACTIONAL SHORTENING: 26 % (ref 28–44)
ECHO LV INTERNAL DIMENSION DIASTOLE INDEX: 2.63 CM/M2
ECHO LV INTERNAL DIMENSION DIASTOLIC: 5 CM (ref 3.9–5.3)
ECHO LV INTERNAL DIMENSION SYSTOLIC INDEX: 1.95 CM/M2
ECHO LV INTERNAL DIMENSION SYSTOLIC: 3.7 CM
ECHO LV IVSD: 1.6 CM (ref 0.6–0.9)
ECHO LV MASS 2D: 322.6 G (ref 67–162)
ECHO LV MASS INDEX 2D: 169.8 G/M2 (ref 43–95)
ECHO LV POSTERIOR WALL DIASTOLIC: 1.4 CM (ref 0.6–0.9)
ECHO LV RELATIVE WALL THICKNESS RATIO: 0.56
ECHO LVOT AREA: 3.1 CM2
ECHO LVOT AV VTI INDEX: 0.56
ECHO LVOT CARDIAC OUTPUT: 0 LITER/MINUTE
ECHO LVOT DIAM: 2 CM
ECHO LVOT MEAN GRADIENT: 2 MMHG
ECHO LVOT PEAK GRADIENT: 5 MMHG
ECHO LVOT PEAK VELOCITY: 1.1 M/S
ECHO LVOT STROKE VOLUME INDEX: 38.2 ML/M2
ECHO LVOT SV: 72.5 ML
ECHO LVOT VTI: 23.1 CM
ECHO MV A VELOCITY: 1.13 M/S
ECHO MV AREA VTI: 2.5 CM2
ECHO MV E VELOCITY: 0.91 M/S
ECHO MV E/A RATIO: 0.81
ECHO MV E/E' LATERAL: 9.1
ECHO MV E/E' RATIO (AVERAGED): 10.24
ECHO MV E/E' SEPTAL: 11.38
ECHO MV LVOT VTI INDEX: 1.24
ECHO MV MAX VELOCITY: 1.2 M/S
ECHO MV MEAN GRADIENT: 3 MMHG
ECHO MV MEAN VELOCITY: 0.8 M/S
ECHO MV PEAK GRADIENT: 6 MMHG
ECHO MV VTI: 28.6 CM
ECHO RIGHT VENTRICULAR SYSTOLIC PRESSURE (RVSP): 27 MMHG
ECHO RV FREE WALL PEAK S': 11 CM/S
ECHO RV INTERNAL DIMENSION: 3.2 CM
ECHO RV TAPSE: 1.8 CM (ref 1.7–?)
ECHO TV PEAK GRADIENT: 8 MMHG
ECHO TV REGURGITANT MAX VELOCITY: 2.2 M/S
ECHO TV REGURGITANT PEAK GRADIENT: 19 MMHG
EST. AVERAGE GLUCOSE BLD GHB EST-MCNC: 180 MG/DL
FOLATE SERPL-MCNC: 19.5 NG/ML (ref 3.1–17.5)
GLUCOSE BLD STRIP.AUTO-MCNC: 141 MG/DL (ref 70–110)
GLUCOSE BLD STRIP.AUTO-MCNC: 168 MG/DL (ref 70–110)
GLUCOSE BLD STRIP.AUTO-MCNC: 219 MG/DL (ref 70–110)
GLUCOSE BLD STRIP.AUTO-MCNC: 224 MG/DL (ref 70–110)
HBA1C MFR BLD: 7.9 % (ref 4.2–5.6)
IRON SATN MFR SERPL: 13 % (ref 20–50)
IRON SERPL-MCNC: 48 UG/DL (ref 50–175)
SOURCE, COVRS: NORMAL
TIBC SERPL-MCNC: 380 UG/DL (ref 250–450)
TROPONIN-HIGH SENSITIVITY: 25 NG/L (ref 0–54)
TROPONIN-HIGH SENSITIVITY: 26 NG/L (ref 0–54)
VIT B12 SERPL-MCNC: 466 PG/ML (ref 211–911)

## 2022-05-23 PROCEDURE — 74011250636 HC RX REV CODE- 250/636: Performed by: HOSPITALIST

## 2022-05-23 PROCEDURE — 94640 AIRWAY INHALATION TREATMENT: CPT

## 2022-05-23 PROCEDURE — 74011250637 HC RX REV CODE- 250/637: Performed by: INTERNAL MEDICINE

## 2022-05-23 PROCEDURE — 96376 TX/PRO/DX INJ SAME DRUG ADON: CPT

## 2022-05-23 PROCEDURE — 74011250637 HC RX REV CODE- 250/637: Performed by: PSYCHIATRY & NEUROLOGY

## 2022-05-23 PROCEDURE — 74011000258 HC RX REV CODE- 258: Performed by: INTERNAL MEDICINE

## 2022-05-23 PROCEDURE — 74011000636 HC RX REV CODE- 636: Performed by: EMERGENCY MEDICINE

## 2022-05-23 PROCEDURE — 84484 ASSAY OF TROPONIN QUANT: CPT

## 2022-05-23 PROCEDURE — 74011250636 HC RX REV CODE- 250/636: Performed by: INTERNAL MEDICINE

## 2022-05-23 PROCEDURE — 85730 THROMBOPLASTIN TIME PARTIAL: CPT

## 2022-05-23 PROCEDURE — 77010033678 HC OXYGEN DAILY

## 2022-05-23 PROCEDURE — 65270000046 HC RM TELEMETRY

## 2022-05-23 PROCEDURE — 87635 SARS-COV-2 COVID-19 AMP PRB: CPT

## 2022-05-23 PROCEDURE — 74011000250 HC RX REV CODE- 250: Performed by: INTERNAL MEDICINE

## 2022-05-23 PROCEDURE — 36415 COLL VENOUS BLD VENIPUNCTURE: CPT

## 2022-05-23 PROCEDURE — 96366 THER/PROPH/DIAG IV INF ADDON: CPT

## 2022-05-23 PROCEDURE — 86038 ANTINUCLEAR ANTIBODIES: CPT

## 2022-05-23 PROCEDURE — 82962 GLUCOSE BLOOD TEST: CPT

## 2022-05-23 PROCEDURE — 82607 VITAMIN B-12: CPT

## 2022-05-23 PROCEDURE — 93306 TTE W/DOPPLER COMPLETE: CPT

## 2022-05-23 PROCEDURE — 74011636637 HC RX REV CODE- 636/637: Performed by: INTERNAL MEDICINE

## 2022-05-23 PROCEDURE — 96365 THER/PROPH/DIAG IV INF INIT: CPT

## 2022-05-23 PROCEDURE — 93970 EXTREMITY STUDY: CPT

## 2022-05-23 PROCEDURE — 74011250636 HC RX REV CODE- 250/636: Performed by: EMERGENCY MEDICINE

## 2022-05-23 PROCEDURE — 82525 ASSAY OF COPPER: CPT

## 2022-05-23 PROCEDURE — 82784 ASSAY IGA/IGD/IGG/IGM EACH: CPT

## 2022-05-23 PROCEDURE — 96374 THER/PROPH/DIAG INJ IV PUSH: CPT

## 2022-05-23 PROCEDURE — 96375 TX/PRO/DX INJ NEW DRUG ADDON: CPT

## 2022-05-23 RX ORDER — INSULIN LISPRO 100 [IU]/ML
INJECTION, SOLUTION INTRAVENOUS; SUBCUTANEOUS
Status: DISCONTINUED | OUTPATIENT
Start: 2022-05-23 | End: 2022-05-24 | Stop reason: HOSPADM

## 2022-05-23 RX ORDER — CEFTRIAXONE 1 G/1
1000 INJECTION, POWDER, FOR SOLUTION INTRAMUSCULAR; INTRAVENOUS ONCE
Status: COMPLETED | OUTPATIENT
Start: 2022-05-23 | End: 2022-05-23

## 2022-05-23 RX ORDER — METOPROLOL TARTRATE 25 MG/1
12.5 TABLET, FILM COATED ORAL EVERY 12 HOURS
Status: DISCONTINUED | OUTPATIENT
Start: 2022-05-23 | End: 2022-05-24 | Stop reason: HOSPADM

## 2022-05-23 RX ORDER — GABAPENTIN 300 MG/1
300 CAPSULE ORAL 2 TIMES DAILY
Status: DISCONTINUED | OUTPATIENT
Start: 2022-05-23 | End: 2022-05-23

## 2022-05-23 RX ORDER — POLYETHYLENE GLYCOL 3350 17 G/17G
17 POWDER, FOR SOLUTION ORAL DAILY PRN
Status: DISCONTINUED | OUTPATIENT
Start: 2022-05-23 | End: 2022-05-24 | Stop reason: HOSPADM

## 2022-05-23 RX ORDER — IPRATROPIUM BROMIDE 0.5 MG/2.5ML
0.5 SOLUTION RESPIRATORY (INHALATION)
Status: DISCONTINUED | OUTPATIENT
Start: 2022-05-23 | End: 2022-05-24 | Stop reason: HOSPADM

## 2022-05-23 RX ORDER — DIAZEPAM 10 MG/2ML
2 INJECTION INTRAMUSCULAR
Status: DISCONTINUED | OUTPATIENT
Start: 2022-05-23 | End: 2022-05-23

## 2022-05-23 RX ORDER — NITROGLYCERIN 0.4 MG/1
0.4 TABLET SUBLINGUAL
Status: DISCONTINUED | OUTPATIENT
Start: 2022-05-23 | End: 2022-05-24 | Stop reason: HOSPADM

## 2022-05-23 RX ORDER — POLYETHYLENE GLYCOL 3350 17 G/17G
17 POWDER, FOR SOLUTION ORAL DAILY
Status: DISCONTINUED | OUTPATIENT
Start: 2022-05-23 | End: 2022-05-24 | Stop reason: HOSPADM

## 2022-05-23 RX ORDER — GABAPENTIN 300 MG/1
300 CAPSULE ORAL 2 TIMES DAILY
Status: DISCONTINUED | OUTPATIENT
Start: 2022-05-23 | End: 2022-05-24 | Stop reason: HOSPADM

## 2022-05-23 RX ORDER — HEPARIN SODIUM 1000 [USP'U]/ML
80 INJECTION, SOLUTION INTRAVENOUS; SUBCUTANEOUS ONCE
Status: COMPLETED | OUTPATIENT
Start: 2022-05-23 | End: 2022-05-23

## 2022-05-23 RX ORDER — BUDESONIDE 0.25 MG/2ML
250 INHALANT ORAL
Status: DISCONTINUED | OUTPATIENT
Start: 2022-05-23 | End: 2022-05-24 | Stop reason: HOSPADM

## 2022-05-23 RX ORDER — LORAZEPAM 2 MG/ML
1 INJECTION, SOLUTION INTRAMUSCULAR; INTRAVENOUS ONCE
Status: COMPLETED | OUTPATIENT
Start: 2022-05-23 | End: 2022-05-23

## 2022-05-23 RX ORDER — IPRATROPIUM BROMIDE 0.5 MG/2.5ML
0.5 SOLUTION RESPIRATORY (INHALATION) EVERY 8 HOURS
Status: DISCONTINUED | OUTPATIENT
Start: 2022-05-23 | End: 2022-05-23

## 2022-05-23 RX ORDER — ROPINIROLE 1 MG/1
5 TABLET, FILM COATED ORAL
Status: DISCONTINUED | OUTPATIENT
Start: 2022-05-23 | End: 2022-05-23

## 2022-05-23 RX ORDER — INSULIN GLARGINE 100 [IU]/ML
0.2 INJECTION, SOLUTION SUBCUTANEOUS DAILY
Status: DISCONTINUED | OUTPATIENT
Start: 2022-05-23 | End: 2022-05-24 | Stop reason: HOSPADM

## 2022-05-23 RX ORDER — ONDANSETRON 4 MG/1
4 TABLET, ORALLY DISINTEGRATING ORAL
Status: DISCONTINUED | OUTPATIENT
Start: 2022-05-23 | End: 2022-05-24 | Stop reason: HOSPADM

## 2022-05-23 RX ORDER — BUDESONIDE AND FORMOTEROL FUMARATE DIHYDRATE 80; 4.5 UG/1; UG/1
2 AEROSOL RESPIRATORY (INHALATION)
Status: DISCONTINUED | OUTPATIENT
Start: 2022-05-23 | End: 2022-05-23

## 2022-05-23 RX ORDER — IPRATROPIUM BROMIDE 0.5 MG/2.5ML
0.5 SOLUTION RESPIRATORY (INHALATION)
Status: DISCONTINUED | OUTPATIENT
Start: 2022-05-23 | End: 2022-05-23

## 2022-05-23 RX ORDER — SODIUM CHLORIDE 0.9 % (FLUSH) 0.9 %
5-40 SYRINGE (ML) INJECTION AS NEEDED
Status: DISCONTINUED | OUTPATIENT
Start: 2022-05-23 | End: 2022-05-24 | Stop reason: HOSPADM

## 2022-05-23 RX ORDER — DEXTROSE MONOHYDRATE 100 MG/ML
0-250 INJECTION, SOLUTION INTRAVENOUS AS NEEDED
Status: DISCONTINUED | OUTPATIENT
Start: 2022-05-23 | End: 2022-05-24 | Stop reason: HOSPADM

## 2022-05-23 RX ORDER — ONDANSETRON 2 MG/ML
4 INJECTION INTRAMUSCULAR; INTRAVENOUS
Status: DISCONTINUED | OUTPATIENT
Start: 2022-05-23 | End: 2022-05-24 | Stop reason: HOSPADM

## 2022-05-23 RX ORDER — CEFTRIAXONE 250 MG/8ML
1000 INJECTION, POWDER, FOR SOLUTION INTRAMUSCULAR; INTRAVENOUS ONCE
Status: DISCONTINUED | OUTPATIENT
Start: 2022-05-23 | End: 2022-05-23

## 2022-05-23 RX ORDER — HEPARIN SODIUM 1000 [USP'U]/ML
40 INJECTION, SOLUTION INTRAVENOUS; SUBCUTANEOUS ONCE
Status: COMPLETED | OUTPATIENT
Start: 2022-05-23 | End: 2022-05-23

## 2022-05-23 RX ORDER — MAGNESIUM SULFATE 100 %
4 CRYSTALS MISCELLANEOUS AS NEEDED
Status: DISCONTINUED | OUTPATIENT
Start: 2022-05-23 | End: 2022-05-24 | Stop reason: HOSPADM

## 2022-05-23 RX ORDER — ROPINIROLE 1 MG/1
5 TABLET, FILM COATED ORAL
Status: COMPLETED | OUTPATIENT
Start: 2022-05-23 | End: 2022-05-23

## 2022-05-23 RX ORDER — SODIUM CHLORIDE 0.9 % (FLUSH) 0.9 %
5-40 SYRINGE (ML) INJECTION EVERY 8 HOURS
Status: DISCONTINUED | OUTPATIENT
Start: 2022-05-23 | End: 2022-05-24 | Stop reason: HOSPADM

## 2022-05-23 RX ORDER — DIAZEPAM 10 MG/2ML
2 INJECTION INTRAMUSCULAR
Status: COMPLETED | OUTPATIENT
Start: 2022-05-23 | End: 2022-05-23

## 2022-05-23 RX ORDER — ACETAMINOPHEN 650 MG/1
650 SUPPOSITORY RECTAL
Status: DISCONTINUED | OUTPATIENT
Start: 2022-05-23 | End: 2022-05-24 | Stop reason: HOSPADM

## 2022-05-23 RX ORDER — LEVOTHYROXINE SODIUM 25 UG/1
25 TABLET ORAL
Status: DISCONTINUED | OUTPATIENT
Start: 2022-05-23 | End: 2022-05-24 | Stop reason: HOSPADM

## 2022-05-23 RX ORDER — ACETAMINOPHEN 325 MG/1
650 TABLET ORAL
Status: DISCONTINUED | OUTPATIENT
Start: 2022-05-23 | End: 2022-05-24 | Stop reason: HOSPADM

## 2022-05-23 RX ORDER — ARFORMOTEROL TARTRATE 15 UG/2ML
15 SOLUTION RESPIRATORY (INHALATION)
Status: DISCONTINUED | OUTPATIENT
Start: 2022-05-23 | End: 2022-05-24 | Stop reason: HOSPADM

## 2022-05-23 RX ORDER — PAROXETINE HYDROCHLORIDE 20 MG/1
40 TABLET, FILM COATED ORAL EVERY EVENING
Status: DISCONTINUED | OUTPATIENT
Start: 2022-05-23 | End: 2022-05-24 | Stop reason: HOSPADM

## 2022-05-23 RX ADMIN — GABAPENTIN 300 MG: 300 CAPSULE ORAL at 12:01

## 2022-05-23 RX ADMIN — TICAGRELOR 90 MG: 90 TABLET ORAL at 20:24

## 2022-05-23 RX ADMIN — ARFORMOTEROL TARTRATE 15 MCG: 15 SOLUTION RESPIRATORY (INHALATION) at 07:40

## 2022-05-23 RX ADMIN — IPRATROPIUM BROMIDE 0.5 MG: 0.5 SOLUTION RESPIRATORY (INHALATION) at 07:40

## 2022-05-23 RX ADMIN — IOPAMIDOL 75 ML: 755 INJECTION, SOLUTION INTRAVENOUS at 00:12

## 2022-05-23 RX ADMIN — LEVOTHYROXINE SODIUM 25 MCG: 0.03 TABLET ORAL at 06:09

## 2022-05-23 RX ADMIN — LORAZEPAM 1 MG: 2 INJECTION, SOLUTION INTRAMUSCULAR; INTRAVENOUS at 03:36

## 2022-05-23 RX ADMIN — LORAZEPAM 1 MG: 2 INJECTION, SOLUTION INTRAMUSCULAR; INTRAVENOUS at 02:01

## 2022-05-23 RX ADMIN — METOPROLOL TARTRATE 12.5 MG: 25 TABLET, FILM COATED ORAL at 20:23

## 2022-05-23 RX ADMIN — Medication 4 UNITS: at 22:23

## 2022-05-23 RX ADMIN — HEPARIN SODIUM 7340 UNITS: 1000 INJECTION INTRAVENOUS; SUBCUTANEOUS at 01:31

## 2022-05-23 RX ADMIN — CEFTRIAXONE 1 G: 1 INJECTION, POWDER, FOR SOLUTION INTRAMUSCULAR; INTRAVENOUS at 09:33

## 2022-05-23 RX ADMIN — IPRATROPIUM BROMIDE 0.5 MG: 0.5 SOLUTION RESPIRATORY (INHALATION) at 20:17

## 2022-05-23 RX ADMIN — CEFTRIAXONE 1 G: 1 INJECTION, POWDER, FOR SOLUTION INTRAMUSCULAR; INTRAVENOUS at 05:58

## 2022-05-23 RX ADMIN — METOPROLOL TARTRATE 12.5 MG: 25 TABLET, FILM COATED ORAL at 09:34

## 2022-05-23 RX ADMIN — HEPARIN SODIUM 18 UNITS/KG/HR: 5000 INJECTION INTRAVENOUS; SUBCUTANEOUS at 01:31

## 2022-05-23 RX ADMIN — HEPARIN SODIUM 17 UNITS/KG/HR: 5000 INJECTION INTRAVENOUS; SUBCUTANEOUS at 20:21

## 2022-05-23 RX ADMIN — IPRATROPIUM BROMIDE 0.5 MG: 0.5 SOLUTION RESPIRATORY (INHALATION) at 14:58

## 2022-05-23 RX ADMIN — PAROXETINE HYDROCHLORIDE 40 MG: 20 TABLET, FILM COATED ORAL at 18:05

## 2022-05-23 RX ADMIN — BUDESONIDE 250 MCG: 0.25 INHALANT RESPIRATORY (INHALATION) at 20:00

## 2022-05-23 RX ADMIN — INSULIN GLARGINE 18 UNITS: 100 INJECTION, SOLUTION SUBCUTANEOUS at 09:34

## 2022-05-23 RX ADMIN — ARFORMOTEROL TARTRATE 15 MCG: 15 SOLUTION RESPIRATORY (INHALATION) at 20:17

## 2022-05-23 RX ADMIN — BUDESONIDE 250 MCG: 0.25 INHALANT RESPIRATORY (INHALATION) at 08:00

## 2022-05-23 RX ADMIN — TICAGRELOR 90 MG: 90 TABLET ORAL at 09:34

## 2022-05-23 RX ADMIN — SODIUM CHLORIDE, PRESERVATIVE FREE 10 ML: 5 INJECTION INTRAVENOUS at 18:05

## 2022-05-23 RX ADMIN — ROPINIROLE HYDROCHLORIDE 5 MG: 1 TABLET, FILM COATED ORAL at 06:20

## 2022-05-23 RX ADMIN — SODIUM CHLORIDE, PRESERVATIVE FREE 10 ML: 5 INJECTION INTRAVENOUS at 06:00

## 2022-05-23 RX ADMIN — GABAPENTIN 300 MG: 300 CAPSULE ORAL at 20:24

## 2022-05-23 RX ADMIN — SODIUM CHLORIDE, PRESERVATIVE FREE 10 ML: 5 INJECTION INTRAVENOUS at 22:22

## 2022-05-23 RX ADMIN — Medication 2 UNITS: at 18:05

## 2022-05-23 RX ADMIN — HEPARIN SODIUM 3660 UNITS: 1000 INJECTION INTRAVENOUS; SUBCUTANEOUS at 10:53

## 2022-05-23 RX ADMIN — DIAZEPAM 2 MG: 5 INJECTION, SOLUTION INTRAMUSCULAR; INTRAVENOUS at 06:02

## 2022-05-23 NOTE — PROGRESS NOTES
Reason for Admission:   Pulmonary edema [J81.1]               RUR Score:     Low, 12%             Resources/supports as identified by patient/family:       PCP: First and Last name:  Jl Moore MD    Top Challenges facing patient (as identified by patient/family and CM): Finances/Medication cost?     Medicare and [employer] or daughter  Support system or lack thereof?   daughter Ran Nieto lives w/ her  Living arrangements? Daughter lives with patient  Self-care/ADLs/Cognition? alert and oriented       Current Advanced Directive/Advance Care Plan:   yes    Healthcare Decision Maker:     Click here to complete [address] including selection of the Healthcare Decision Maker Relationship (ie \"Primary\")                          Plan for utilizing home health:    no                        Anticipated Discharge Plan:                    Initial assessment completed with patient. Cognitive status of patient:alert and oriented x 3  Face sheet information confirmed:  yes. The patient designates daughter to participate in her discharge plan and to receive any needed information. This patient lives in a home with adult daughter  Patient is able to navigate steps as needed - 15 to get into house. Prior to hospitalization, patient was considered to be independent with ADLs/IADLS : yes . Patient has a current ACP document on file: no  The daughter will be available to transport patient home upon discharge. The patient already has no DME use. Patient is notcurrently active with home health. Currently, the discharge plan is home with oral anticoagulation, follow up with MD  The patient states that she can obtain her medications from the pharmacy, and take her medications as directed.         Care Management Interventions  Support Systems: Child(carito)  Discharge Location  Patient Expects to be Discharged to[de-identified] Home

## 2022-05-23 NOTE — DIABETES MGMT
Diabetes/ Glycemic Control Plan of Care  Recommendations:   Continue basal and corrective insulin. Monitor need for adjustments to achieve glycemic targets. Assessment: Patient with a history of diabetes admitted for PE, CAD, and UTI. BG ranging 141-219 mg/dl since admission. She has been started on basal insulin. DX:   1. Multiple subsegmental pulmonary emboli without acute cor pulmonale (HCC)     2. Acute cystitis without hematuria        Recent Glucose Results:   Lab Results   Component Value Date/Time     (H) 05/22/2022 10:19 PM    GLUCPOC 219 (H) 05/23/2022 12:15 PM    GLUCPOC 141 (H) 05/23/2022 07:09 AM            BG within target range (non-ICU: <180; -180):  [] Yes    [x] No   Current insulin orders: 18 units Lantus, corrective Humalog ACHS  Total Daily Dose previous 24 hours = 18 units  Current A1c:   Lab Results   Component Value Date/Time    Hemoglobin A1c 7.9 (H) 05/22/2022 10:19 PM      equivalent  to ave Blood Glucose of 180 mg/dl for 2-3 months prior to admission  Adequate glycemic control PTA: No  Nutrition/Diet:   Active Orders   Diet    ADULT DIET Regular; 5 carb choices (75 gm/meal); Low Fat/Low Chol/High Fiber/2 gm Na; 1500 ml      Home diabetes medications:   Key Antihyperglycemic Medications             metFORMIN (GLUCOPHAGE) 500 mg tablet Take 500 mg by mouth daily (with breakfast). Plan/Goals:   Blood glucose will be within target of 70 - 180 mg/dl within 72 hours  Reinforce dietary and medication compliance at home.        Education:  [] Refer to Diabetes Education Record                       [x] Education not indicated at this time - patient sleepy and not very willing to engage    Anais Shi, [address] [place] [address]  Glycemic Control Team  [phone]    Monday-Friday   9 am - 3 pm

## 2022-05-23 NOTE — CONSULTS
[facility] Consult Note      Patient: Figueroa Coy MRN: [id number]  SSN: [id number]    YOB: 1955  Age: 77 y.o. Sex: female        Date of Consultation: 5/23/2022  Referring Physician: Dr. Keegan Bassett  Reason for Consultation: CAD and PE    HPI:  I was asked by Reynaldo Jernigan to see this patient for pulmonary embolism and recent coronary stenting. Figueroa Coy is a 26-year-old pleasant patient came to the hospital with chest pain and diagnosed with right-sided pulmonary embolism. Patient also underwent PCI on 5/5/2022 to the LAD and left circumflex artery. Patient is on Brilinta 90 mg twice daily  Patient is on heparin. Patient denied any history of bleeding, hematemesis, hemoptysis, hematuria or epistaxis. Patient also have restless leg syndrome started on gabapentin. Now she is feeling somewhat better.   Venous duplex study is negative for DVT    Past Medical History:   Diagnosis Date    Asthma     Diabetes ([facility] [address]     Hypertension     Hypertension, accelerated 4/13/2016    Positive cardiac stress test 4/14/2016    Precordial pain 4/13/2016    Psychiatric disorder     depression     Past Surgical History:   Procedure Laterality Date    HX ORTHOPAEDIC      neck surgery    HX UROLOGICAL      bladder surgery     Current Facility-Administered Medications   Medication Dose Route Frequency    heparin 25,000 units in  mL infusion  18-36 Units/kg/hr IntraVENous TITRATE    ticagrelor (BRILINTA) tablet 90 mg  90 mg Oral BID    PARoxetine (PAXIL) tablet 40 mg  40 mg Oral QPM    metoprolol tartrate (LOPRESSOR) tablet 12.5 mg  12.5 mg Oral Q12H    nitroglycerin (NITROSTAT) tablet 0.4 mg  0.4 mg SubLINGual Q5MIN PRN    polyethylene glycol (MIRALAX) packet 17 g  17 g Oral DAILY    levothyroxine (SYNTHROID) tablet 25 mcg  25 mcg Oral 6am    sodium chloride (NS) flush 5-40 mL  5-40 mL IntraVENous Q8H    sodium chloride (NS) flush 5-40 mL  5-40 mL IntraVENous PRN    acetaminophen (TYLENOL) tablet 650 mg  650 mg Oral Q6H PRN    Or    acetaminophen (TYLENOL) suppository 650 mg  650 mg Rectal Q6H PRN    polyethylene glycol (MIRALAX) packet 17 g  17 g Oral DAILY PRN    ondansetron (ZOFRAN ODT) tablet 4 mg  4 mg Oral Q8H PRN    Or    ondansetron (ZOFRAN) injection 4 mg  4 mg IntraVENous Q6H PRN    insulin lispro (HUMALOG) injection   SubCUTAneous AC&HS    glucose chewable tablet 16 g  4 Tablet Oral PRN    glucagon (GLUCAGEN) injection 1 mg  1 mg IntraMUSCular PRN    dextrose 10% infusion 0-250 mL  0-250 mL IntraVENous PRN    insulin glargine (LANTUS) injection 18 Units  0.2 Units/kg SubCUTAneous DAILY    cefTRIAXone (ROCEPHIN) 1 g in 0.9% sodium chloride (MBP/ADV) 50 mL MBP  1 g IntraVENous Q24H    arformoteroL (BROVANA) neb solution 15 mcg  15 mcg Nebulization BID RT    And    budesonide (PULMICORT) 250 mcg/2ml nebulizer susp  250 mcg Nebulization BID RT    ipratropium (ATROVENT) 0.02 % nebulizer solution 0.5 mg  0.5 mg Nebulization TID RT    gabapentin (NEURONTIN) capsule 300 mg  300 mg Oral BID       Allergies and Intolerances: Allergies   Allergen Reactions    Statins-Hmg-Coa Reductase Inhibitors Other (comments)       Family History:   History reviewed. No pertinent family history. Social History:   She  reports that she has never smoked. She has never used smokeless tobacco.  She  reports no history of alcohol use. Review of Systems:     Review of Systems  Gen: No fever, chills, malaise, weight loss/gain. Heent: No headache, rhinorrhea, epistaxis, ear pain, hearing loss, sinus pain, neck pain/stiffness, sore throat. Heart: No chest pain, palpitations, BIRD, pnd, or orthopnea. Resp: No cough, hemoptysis, wheezing and shortness of breath. GI: No nausea, vomiting, diarrhea, constipation, melena or hematochezia. : No urinary obstruction, dysuria or hematuria. Derm: No rash, new skin lesion or pruritis. Musc/skeletal: no bone or joint complains.   Vasc: No edema, cyanosis or claudication. Endo: No heat/cold intolerance, no polyuria,polydipsia or polyphagia. Neuro: No unilateral weakness, numbness, tingling. No seizures. Heme: No easy bruising or bleeding. Physical:   Patient Vitals for the past 6 hrs:   Temp Pulse Resp BP SpO2   05/23/22 1606 98.4 °F (36.9 °C) 87 15 (!) 138/51 99 %         Exam:   General Appearance: Comfortable, not using accessory muscles of respiration. HEENT: PAT. HEAD: Atraumatic  NECK: No JVD, no thyroidomeglay. LUNGS: Clear bilaterally. HEART: S1+S2     ABD: Non-tender, BS Audible    EXT: No edema, and no cysnosis. VASCULAR EXAM: Pulses are intact. PSYCHIATRIC EXAM: Mood is appropriate. MUSCULOSKELETAL: Grossly no joint deformity. NEUROLOGICAL: Motor and sensory sytem intact and Cranial nerves II-XII intact. Review of Data:   LABS:   Lab Results   Component Value Date/Time    WBC 8.3 05/22/2022 10:19 PM    HGB 12.3 05/22/2022 10:19 PM    HCT 37.2 05/22/2022 10:19 PM    PLATELET 153 78/83/6799 10:19 PM     Lab Results   Component Value Date/Time    Sodium 138 05/22/2022 10:19 PM    Potassium 4.3 05/22/2022 10:19 PM    Chloride 105 05/22/2022 10:19 PM    CO2 25 05/22/2022 10:19 PM    Glucose 205 (H) 05/22/2022 10:19 PM    BUN 22 (H) 05/22/2022 10:19 PM    Creatinine 0.89 05/22/2022 10:19 PM     Lab Results   Component Value Date/Time    Cholesterol, total 205 (H) 05/04/2022 03:39 AM    HDL Cholesterol 57 05/04/2022 03:39 AM    LDL, calculated 123.6 (H) 05/04/2022 03:39 AM    Triglyceride 122 05/04/2022 03:39 AM     No components found for: GPT  Lab Results   Component Value Date/Time    Hemoglobin A1c 7.9 (H) 05/22/2022 10:19 PM       RADIOLOGY:  CT Results  (Last 48 hours)               05/23/22 0000  CTA CHEST W OR W WO CONT Final result    Impression:      Filling defects within one right upper lobe and one right lower lobe peripheral   pulmonary artery branches suggests minimal pulmonary embolism of uncertain   acuity.        No focal lung consolidation or evidence for significant pleural effusion. Narrative:  EXAM: CTA chest       INDICATION: Pain. Shortness of breath. Concern for pulmonary embolism. COMPARISON: None       TECHNIQUE: Axial CT imaging from the thoracic inlet through the diaphragm with   intravenous contrast. Coronal and sagittal MIP reformats were generated. Dose   reduction techniques used: automated exposure control, adjustment of the mAs   and/or kVp according to patient size, and iterative reconstruction techniques. Digital imaging and communications in Medicine (DICOM) format image data are   available to nonaffiliated external healthcare facilities or entities on a   secure, media free, reciprocally searchable basis with patient authorization for   at least 12 months after this study. _______________       FINDINGS:       EXAM QUALITY: Adequate. PULMONARY ARTERIES: There are apparent filling defects within one right upper   lobe pulmonary artery branch and one right lower lobe pulmonary artery branch   peripherally. MEDIASTINUM: Normal heart size. No evidence of right heart strain. Aorta is   unremarkable. No pericardial effusion. LUNGS: No suspicious nodule or mass. No abnormal opacities. PLEURA: Normal.       AIRWAY: Normal.       LYMPH NODES: No enlarged nodes. UPPER ABDOMEN: Unremarkable. OTHER: No acute or aggressive osseous abnormalities identified. _______________               CXR Results  (Last 48 hours)               05/22/22 2228  XR CHEST PORT Final result    Impression:  --------------       No evidence for active cardiopulmonary disease. No significant interval change.                Narrative:  ---------------------------------------------------------------------------   <<<<<<<<<           [facility]           >>>>>>>>>    --------------------------------------------------------------------------- CLINICAL HISTORY:  Chest pain. COMPARISON EXAMINATIONS:  May 3, 2022. ---  SINGLE FRONTAL VIEW OF THE CHEST  ---       The cardiomediastinal silhouette is stable. There is no focal consolidation or   pleural effusion. No significant osseous abnormalities are identified.             --------------               Cardiology Procedures:   Results for orders placed or performed during the hospital encounter of 05/22/22   EKG, 12 LEAD, INITIAL   Result Value Ref Range    Ventricular Rate 94 BPM    Atrial Rate 94 BPM    P-R Interval 172 ms    QRS Duration 74 ms    Q-T Interval 364 ms    QTC Calculation (Bezet) 455 ms    Calculated P Axis 45 degrees    Calculated R Axis 39 degrees    Calculated T Axis 61 degrees    Diagnosis       Normal sinus rhythm  Normal ECG  When compared with ECG of 05-MAY-2022 15:57,  No significant change was found        Echo Results  (Last 48 hours)    None       Cardiolite (Tc-99m Sestamibi) stress test        Impression / Plan:    Patient Active Problem List   Diagnosis Code    DM II [facility]di[facility] mellitus, type II[facility] E11.9    Generalized anxiety disorder F41.1    Coronary artery disease involving native coronary artery I25.10    Non-STEMI (non-ST elevated myocardial infarction) ([facility]) I21.4    HTN (hypertension) I10    Hx of heart artery stent Z95.5    Pulmonary edema J81.1    Pulmonary embolus ([facility]) I26.99    Restless leg syndrome G25.81        A/P    Pulmonary embolism  CAD status post recent stenting to LAD and left circumflex artery on 5/5/2022  CAD with history of PCI in LAD in 2016  Hypertension  Diabetes mellitus  Restless leg syndrome      Plan.   No evidence of ACS  RV size and function is normal no evidence of RV strain due to pulmonary embolism    Continue with Brilinta 90 mg twice daily  Stop aspirin  Patient can be switched to Eliquis  Management was discussed in detail with the patient and family      Signed By: Kelly De La Cruz MD     May 23, 2022

## 2022-05-23 NOTE — PROGRESS NOTES
Comprehensive Nutrition Assessment    Type and Reason for Visit: Initial,Positive nutrition screen    Nutrition Recommendations/Plan:   1. Continue with current diet to meet needs. Malnutrition Assessment:  Malnutrition Status:  No malnutrition (05/23/22 1307)      Nutrition Assessment:    PMHx: diabetes, asthma, CAD s/p stent x2 weeks. Presented with chest pain and wosening restless leg syndrome. Admitted with Pulmonary embolus, Restless leg syndrome. Nutrition Related Findings:    No wounds. Spoke with pt- stated intake varies; states eats 2 meals/day with snacks. No ONS prior to admission. Pt reports no weight lost but has gained weight. Weight hx reviewed- no weight lost noted: 180lb 12/2020; 170lb 3/22/2021, 202lb 5/3/2022. Wound Type: None    Current Nutrition Intake & Therapies:  Average Meal Intake: Unable to assess  Average Supplement Intake: None ordered  ADULT DIET Regular; 5 carb choices (75 gm/meal); Low Fat/Low Chol/High Fiber/2 gm Na; 1500 ml    Anthropometric Measures:  Height: 5' 1\" (154.9 cm)  Ideal Body Weight (IBW): 105 lbs (48 kg)  Admission Body Weight: 202 lb  Current Body Wt:  91.6 kg (201 lb 15.1 oz), 192.3 % IBW. Stated  Current BMI (kg/m2): 38.2  Usual Body Weight: 77.1 kg (170 lb) (3/2021)  % Weight Change (Calculated): 18.8  Weight Adjustment: No adjustment                 BMI Category: Obese class 2 (BMI 35.0-39. 9)    Estimated Daily Nutrient Needs:  Energy Requirements Based On: Formula  Weight Used for Energy Requirements: Current  Energy (kcal/day): 7220-6618  Weight Used for Protein Requirements: Current  Protein (g/day): 92  Method Used for Fluid Requirements: 1 ml/kcal  Fluid (ml/day): 4802-0424    Nutrition Diagnosis:   · No nutrition diagnosis at this time    Nutrition Interventions:   Food and/or Nutrient Delivery: Continue current diet  Nutrition Education/Counseling: No recommendations at this time  Coordination of Nutrition Care: Continue to monitor while inpatient Goals:     Goals: PO intake 50% or greater,within 4-6 days       Nutrition Monitoring and Evaluation:   Behavioral-Environmental Outcomes: None identified  Food/Nutrient Intake Outcomes: Food and nutrient intake  Physical Signs/Symptoms Outcomes: Biochemical data,Meal time behavior,Skin,Weight    Discharge Planning:    Continue current diet    Radhika Triplett RD

## 2022-05-23 NOTE — ED NOTES
TRANSFER - OUT REPORT:    Verbal report given to ANAI Schaffer(name) on Alexander Mosley  being transferred to telemetry(unit) for routine progression of care       Report consisted of patients Situation, Background, Assessment and   Recommendations(SBAR). Information from the following report(s) ED Summary was reviewed with the receiving nurse. Lines:   Peripheral IV 05/23/22 Anterior;Right Wrist (Active)        Opportunity for questions and clarification was provided.       Patient transported with:   Registered Nurse

## 2022-05-23 NOTE — ED PROVIDER NOTES
70-year-old female with history of asthma, hypertension, diabetes, CAD status post NSTEMI and stenting on May 6. Presents emergency department today complaining of chest pain that started this morning. Pain is bilateral and substernal described as sharp and worse with deep breathing. She also complaining of bilateral leg pain and swelling which is of a chronic nature but she says it is worse at night. Discharged on Brilinta but no blood thinners. EKG done on arrival shows no STEMI           Past Medical History:   Diagnosis Date    Asthma     Diabetes ([facility] [address]     Hypertension     Hypertension, accelerated 4/13/2016    Positive cardiac stress test 4/14/2016    Precordial pain 4/13/2016    Psychiatric disorder     depression       Past Surgical History:   Procedure Laterality Date    HX ORTHOPAEDIC      neck surgery    HX UROLOGICAL      bladder surgery         No family history on file. Social History     Socioeconomic History    Marital status:      Spouse name: Not on file    Number of children: Not on file    Years of education: Not on file    Highest education level: Not on file   Occupational History    Not on file   Tobacco Use    Smoking status: Never Smoker    Smokeless tobacco: Never Used   Vaping Use    Vaping Use: Never used   Substance and Sexual Activity    Alcohol use: No    Drug use: Never    Sexual activity: Not on file   Other Topics Concern    Not on file   Social History Narrative    Not on file     Social Determinants of Health     Financial Resource Strain:     Difficulty of Paying Living Expenses: Not on file   Food Insecurity:     Worried About Running Out of Food in the Last Year: Not on file    Volodymyr of Food in the Last Year: Not on file   Transportation Needs:     Lack of Transportation (Medical): Not on file    Lack of Transportation (Non-Medical):  Not on file   Physical Activity:     Days of Exercise per Week: Not on file    Minutes of Exercise per Session: Not on file   Stress:     Feeling of Stress : Not on file   Social Connections:     Frequency of Communication with Friends and Family: Not on file    Frequency of Social Gatherings with Friends and Family: Not on file    Attends [employer] Services: Not on file    Active Member of Clubs or Organizations: Not on file    Attends Club or Organization Meetings: Not on file    Marital Status: Not on file   Intimate Partner Violence:     Fear of Current or Ex-Partner: Not on file    Emotionally Abused: Not on file    Physically Abused: Not on file    Sexually Abused: Not on file   Housing Stability:     Unable to Pay for Housing in the Last Year: Not on file    Number of [place] in the Last Year: Not on file    Unstable Housing in the Last Year: Not on file         ALLERGIES: Statins-hmg-coa reductase inhibitors    Review of Systems   Constitutional: Positive for activity change. Negative for appetite change, chills, diaphoresis, fatigue, fever and unexpected weight change. HENT: Negative. Eyes: Negative. Respiratory: Positive for shortness of breath. Negative for apnea, cough, choking, chest tightness, wheezing and stridor. Cardiovascular: Positive for chest pain and leg swelling. Negative for palpitations. Gastrointestinal: Negative. Endocrine: Negative. Genitourinary: Negative. Musculoskeletal: Positive for arthralgias and myalgias. Negative for back pain, gait problem, neck pain and neck stiffness. Skin: Negative. Allergic/Immunologic: Negative. Neurological: Negative. Hematological: Negative. Psychiatric/Behavioral: Negative. Vitals:    05/23/22 0042 05/23/22 0112 05/23/22 0121 05/23/22 0142   BP: (!) 141/111 (!) 132/108 (!) 115/58    Pulse:       Resp:       Temp:       SpO2: 91% 92% 92% 92%   Weight:       Height:                Physical Exam  Vitals and nursing note reviewed. Constitutional:       General: She is not in acute distress. Appearance: She is well-developed and normal weight. She is not ill-appearing, toxic-appearing or diaphoretic. Neck:      Thyroid: No thyromegaly. Vascular: No hepatojugular reflux or JVD. Cardiovascular:      Rate and Rhythm: Normal rate and regular rhythm. No extrasystoles are present. Chest Wall: PMI is displaced. Heart sounds: Heart sounds not distant. No systolic murmur is present. No diastolic murmur is present. No friction rub. No gallop. Pulmonary:      Effort: No tachypnea or accessory muscle usage. Breath sounds: Normal breath sounds. No stridor. Chest:      Chest wall: No mass, deformity, tenderness or crepitus. Abdominal:      Palpations: Abdomen is soft. Musculoskeletal:         General: Normal range of motion. Cervical back: Normal range of motion. Right lower leg: Tenderness present. Edema present. Left lower leg: Tenderness present. Edema present. Lymphadenopathy:      Cervical: No cervical adenopathy. Skin:     General: Skin is warm and dry. Capillary Refill: Capillary refill takes less than 2 seconds. Neurological:      General: No focal deficit present. Mental Status: She is alert and oriented to person, place, and time. Cranial Nerves: No cranial nerve deficit. MDM  Number of Diagnoses or Management Options  Multiple subsegmental pulmonary emboli without acute cor pulmonale (HCC)  Diagnosis management comments: 51-year-old female history of hypertension, diabetes, CAD status post stenting NSTEMI beginning of this month with 2 stents placed presents emergency room with 10 to 12 hours of chest pain. She arrives emergency department in no acute distress. EKG showed old ST nonspecific changes without evidence of STEMI. No tachycardia noted at the time of arrival.  On exam patient has lungs are clear to auscultation bilaterally heart sounds are largely unremarkable she is in no acute distress.   Speaking in full sentences and in addition to complaining of chest pain is also complaining of bilateral leg pain. Patient on monitor IV was started and labs were sent. There is high suspicion of reinfarction versus possible PE from a low activity especially in the face of lower extremity pain even though this is somewhat chronic. As ultrasound/vascular is not in [place] CTA was ordered. Initial troponin returned below threshold for ischemia. CT of the chest to rule out PE does show apparent filling defects within one right upper  lobe pulmonary artery branch and one right lower lobe pulmonary artery branch peripherally. Delta troponin is decreased from previous ACS unlikely. Duplex of the bilateral lower extremities shows no evidence of DVT.     Heparin Started  UA shows possible UTI  Rocephin ordered     Will discuss with hospitalist for admission  Admitted to Dr Sumanth Guillaume for PE         Procedures

## 2022-05-23 NOTE — CONSULTS
NEUROLOGY CONSULTATION NOTE    Patient: Diego Farmer MRN: [id number]  CSN: [id number]    YOB: 1955  Age: 77 y.o. Sex: female    DOA: 5/22/2022 LOS:  LOS: 0 days        Requesting Physician: Dr. Armida Calderon  Reason for Consultation: Restless legs and peripheral neuropathy              HISTORY OF PRESENT ILLNESS:   Diego Farmer is a 77 y.o. female with history of diabetes, hypertension, depression, asthma, coronary artery disease, who was admitted with chest pain. She says she cannot sleep due to restlessness and paresthesias on her legs. She was treated with gabapentin in the past, which helped with the symptoms. Ropinirole is not helping. Tonight, she could not sleep due to restless leg symptoms and paresthesias. She denies any upper extremity symptoms. PAST MEDICAL HISTORY:  Past Medical History:   Diagnosis Date    Asthma     Diabetes ([facility] [address]     Hypertension     Hypertension, accelerated 4/13/2016    Positive cardiac stress test 4/14/2016    Precordial pain 4/13/2016    Psychiatric disorder     depression     PAST SURGICAL HISTORY:  Past Surgical History:   Procedure Laterality Date    HX ORTHOPAEDIC      neck surgery    HX UROLOGICAL      bladder surgery     FAMILY HISTORY:  History reviewed. No pertinent family history.   SOCIAL HISTORY:  Social History     Socioeconomic History    Marital status:    Tobacco Use    Smoking status: Never Smoker    Smokeless tobacco: Never Used   Vaping Use    Vaping Use: Never used   Substance and Sexual Activity    Alcohol use: No    Drug use: Never     MEDICATIONS:  Current Facility-Administered Medications   Medication Dose Route Frequency    heparin 25,000 units in  mL infusion  18-36 Units/kg/hr IntraVENous TITRATE    ticagrelor (BRILINTA) tablet 90 mg  90 mg Oral BID    rOPINIRole (REQUIP) tablet 5 mg  5 mg Oral QHS    PARoxetine (PAXIL) tablet 40 mg  40 mg Oral QPM    metoprolol tartrate (LOPRESSOR) tablet 12.5 mg 12.5 mg Oral Q12H    nitroglycerin (NITROSTAT) tablet 0.4 mg  0.4 mg SubLINGual Q5MIN PRN    polyethylene glycol (MIRALAX) packet 17 g  17 g Oral DAILY    levothyroxine (SYNTHROID) tablet 25 mcg  25 mcg Oral 6am    sodium chloride (NS) flush 5-40 mL  5-40 mL IntraVENous Q8H    sodium chloride (NS) flush 5-40 mL  5-40 mL IntraVENous PRN    acetaminophen (TYLENOL) tablet 650 mg  650 mg Oral Q6H PRN    Or    acetaminophen (TYLENOL) suppository 650 mg  650 mg Rectal Q6H PRN    polyethylene glycol (MIRALAX) packet 17 g  17 g Oral DAILY PRN    ondansetron (ZOFRAN ODT) tablet 4 mg  4 mg Oral Q8H PRN    Or    ondansetron (ZOFRAN) injection 4 mg  4 mg IntraVENous Q6H PRN    insulin lispro (HUMALOG) injection   SubCUTAneous AC&HS    glucose chewable tablet 16 g  4 Tablet Oral PRN    glucagon (GLUCAGEN) injection 1 mg  1 mg IntraMUSCular PRN    dextrose 10% infusion 0-250 mL  0-250 mL IntraVENous PRN    insulin glargine (LANTUS) injection 18 Units  0.2 Units/kg SubCUTAneous DAILY    cefTRIAXone (ROCEPHIN) 1 g in 0.9% sodium chloride (MBP/ADV) 50 mL MBP  1 g IntraVENous Q24H    arformoteroL (BROVANA) neb solution 15 mcg  15 mcg Nebulization BID RT    And    budesonide (PULMICORT) 250 mcg/2ml nebulizer susp  250 mcg Nebulization BID RT    ipratropium (ATROVENT) 0.02 % nebulizer solution 0.5 mg  0.5 mg Nebulization TID RT    heparin (porcine) 1,000 unit/mL injection 3,660 Units  40 Units/kg IntraVENous ONCE     Prior to Admission medications    Medication Sig Start Date End Date Taking? Authorizing Provider   metoprolol tartrate (LOPRESSOR) 25 mg tablet Take 0.5 Tablets by mouth every twelve (12) hours. 5/6/22   Jovon Bui MD   nitroglycerin (NITROSTAT) 0.4 mg SL tablet 1 Tablet by SubLINGual route every five (5) minutes as needed for Chest Pain. Up to 3 doses.  5/6/22   Jovon Bui MD   levothyroxine (SYNTHROID) 25 mcg tablet 25 mcg. 2/25/20   Other, Phys, MD   rOPINIRole (Requip) 5 mg tablet Take 1 Tab by mouth nightly. 12/30/20   Dora Lopez MD   polyethylene glycol (Miralax) 17 gram/dose powder Take 17 g by mouth daily. 1 tablespoon with 8 oz of water daily 12/30/20   Dora Lopez MD   cyclobenzaprine (FLEXERIL) 10 mg tablet Take 1 Tab by mouth three (3) times daily as needed for Muscle Spasm(s). 5/1/19   Ashish Davidson MD   aspirin delayed-release 81 mg tablet Take 1 Tab by mouth daily. 4/15/16   Bruce Christopher MD   ticagrelor (BRILINTA) 90 mg tablet Take 1 Tab by mouth two (2) times a day. 4/15/16   Bruce Christopher MD   metFORMIN (GLUCOPHAGE) 500 mg tablet Take 500 mg by mouth daily (with breakfast). Other, MD Tamar   PARoxetine (PAXIL) 40 mg tablet Take 40 mg by mouth every evening. Provider, Historical   cycloSPORINE (RESTASIS) 0.05 % ophthalmic emulsion Administer 1 Drop to both eyes every twelve (12) hours. Provider, Historical   budesonide-formoterol (SYMBICORT) 80-4.5 mcg/actuation HFAA inhaler Take 2 Puffs by inhalation two (2) times a day. Provider, Historical   albuterol (PROVENTIL HFA, VENTOLIN HFA, PROAIR HFA) 90 mcg/actuation inhaler Take 2 Puffs by inhalation every four (4) hours as needed for Wheezing. Provider, Historical   vitamin E (AQUA GEMS) 400 unit capsule Take 400 Units by mouth daily. Provider, Historical   vit a,c & e-lutein-minerals (OCUVITE) tablet 1 Tab daily. Provider, Historical   potassium 99 mg tablet Take 99 mg by mouth daily. Provider, Historical       ALLERGIES:  Allergies   Allergen Reactions    Statins-Hmg-Coa Reductase Inhibitors Other (comments)       Review of Systems  GENERAL: No fevers or chills. HEENT: No change in vision, earache, tinnitus, sore throat or sinus congestion. NECK: No pain or stiffness. CARDIOVASCULAR: No chest pain or pressure. No palpitations. PULMONARY: No shortness of breath, cough or wheeze. GASTROINTESTINAL: No abdominal pain, nausea, vomiting or diarrhea.   GENITOURINARY: No urinary frequency or dysuria. MUSCULOSKELETAL: No joint or muscle pain, no back pain, no recent trauma. DERMATOLOGIC: No rash, no itching, no lesions. ENDOCRINE: No heat or cold intolerance. HEMATOLOGICAL: No anemia or easy bruising or bleeding. NEUROLOGIC: No headache, seizures. See HPI. PHYSICAL EXAMINATION:     Visit Vitals  /62   Pulse 91   Temp 98.1 °F (36.7 °C)   Resp 16   Ht 5' 1\" (1.549 m)   Wt 91.6 kg (202 lb)   SpO2 95%   BMI 38.17 kg/m²    O2 Flow Rate (L/min): 2 l/min O2 Device: Nasal cannula  GENERAL: Pleasant, in no apparent distress. HEENT: Moist mucous membranes, sclerae anicteric, scalp is atraumatic. CVS: Regular rate and rhythm, no murmurs or gallops. No carotid bruits. PULMONARY: Clear to auscultation bilaterally. No rales or rhonchi. No wheezing. EXTREMITIES: Normal range of motion at all sites. No deformities. ABDOMEN: Soft, nontender. SKIN: No rashes. Ecchymosis on the right knee. NEUROLOGIC: Alert and oriented x3. Speech is fluent without any aphasia or dysarthria. Cranial nerves: Face is symmetric with symmetric smile. Facial sensation is intact. Extraocular movements are intact with no nystagmus. Visual fields are full to confrontation. PERRL. Tongue is midline. Palate elevates symmetrically. Hearing intact to speech. Sternocleidomastoid and trapezius strengths are full bilaterally. Motor: Normal tone and normal bulk on all four extremities. Strength is full on all four segmentally. There is no pronator drift or orbiting. Sensory: There is length dependent sensory loss to vibration on both lower extremities. Coordination: Intact coordination with finger-nose-finger bilaterally. Normal fine movements. No bradykinesia detected. Deep tendon reflexes: 2+ at biceps, brachioradialis, patella and ankles bilaterally. Toes are down-going bilaterally. Gait assessment: Deferred.     Labs: Results:       Chemistry Recent Labs     05/22/22  2219   *      K 4.3    CO2 25   BUN 22*   CREA 0.89   CA 8.7   AGAP 8   BUCR 25*      TP 6.7   ALB 3.3*   GLOB 3.4   AGRAT 1.0      CBC w/Diff Recent Labs     05/22/22 2219   WBC 8.3   RBC 4.35   HGB 12.3   HCT 37.2      GRANS 70   LYMPH 19*   EOS 2      Cardiac Enzymes Recent Labs     05/22/22 2219         Coagulation Recent Labs     05/23/22  0810 05/22/22 2219   PTP  --  11.5   INR  --  0.8   APTT 72.6* 25.1       Lipid Panel Lab Results   Component Value Date/Time    Cholesterol, total 205 (H) 05/04/2022 03:39 AM    HDL Cholesterol 57 05/04/2022 03:39 AM    LDL, calculated 123.6 (H) 05/04/2022 03:39 AM    VLDL, calculated 24.4 05/04/2022 03:39 AM    Triglyceride 122 05/04/2022 03:39 AM    CHOL/HDL Ratio 3.6 05/04/2022 03:39 AM      BNP No results for input(s): BNPP in the last 72 hours. Liver Enzymes Recent Labs     05/22/22 2219   TP 6.7   ALB 3.3*         Thyroid Studies Lab Results   Component Value Date/Time    TSH 3.70 05/04/2022 03:39 AM          Radiology:  CTA CHEST W OR W WO CONT    Result Date: 5/23/2022  EXAM: CTA chest INDICATION: Pain. Shortness of breath. Concern for pulmonary embolism. COMPARISON: None TECHNIQUE: Axial CT imaging from the thoracic inlet through the diaphragm with intravenous contrast. Coronal and sagittal MIP reformats were generated. Dose reduction techniques used: automated exposure control, adjustment of the mAs and/or kVp according to patient size, and iterative reconstruction techniques. Digital imaging and communications in Medicine (DICOM) format image data are available to nonaffiliated external healthcare facilities or entities on a secure, media free, reciprocally searchable basis with patient authorization for at least 12 months after this study. _______________ FINDINGS: EXAM QUALITY: Adequate.   PULMONARY ARTERIES: There are apparent filling defects within one right upper lobe pulmonary artery branch and one right lower lobe pulmonary artery branch peripherally. MEDIASTINUM: Normal heart size. No evidence of right heart strain. Aorta is unremarkable. No pericardial effusion. LUNGS: No suspicious nodule or mass. No abnormal opacities. PLEURA: Normal. AIRWAY: Normal. LYMPH NODES: No enlarged nodes. UPPER ABDOMEN: Unremarkable. OTHER: No acute or aggressive osseous abnormalities identified. _______________     Filling defects within one right upper lobe and one right lower lobe peripheral pulmonary artery branches suggests minimal pulmonary embolism of uncertain acuity. No focal lung consolidation or evidence for significant pleural effusion. XR CHEST PORT    Result Date: 5/22/2022  --------------------------------------------------------------------------- <<<<<<<<<           [facility] Radiology  [facility]           >>>>>>>>> --------------------------------------------------------------------------- CLINICAL HISTORY:  Chest pain. COMPARISON EXAMINATIONS:  May 3, 2022. ---  SINGLE FRONTAL VIEW OF THE CHEST  --- The cardiomediastinal silhouette is stable. There is no focal consolidation or pleural effusion. No significant osseous abnormalities are identified.  --------------    -------------- No evidence for active cardiopulmonary disease. No significant interval change. XR CHEST PORT    Result Date: 5/4/2022  EXAM: XR CHEST PORT. CLINICAL INDICATION/HISTORY: chest pain. -Additional: None. TECHNIQUE: A portable erect AP radiographic view of the chest. COMPARISON: Radiograph of 03/22/2021. _______________ FINDINGS: Lungs and pleural spaces: > Mild central vascular and diffuse interstitial prominence, suggestive of mild pulmonary edema. > No pneumothorax or appreciable significant pleural effusion. Cardiomediastinal silhouette: > Mild prominence of the cardiac silhouette may be entirely artifactual secondary to AP portable technique. Bones: > No acute findings. Other: > None. _______________     Findings suggestive of mild pulmonary edema. _______________     ECHO ADULT COMPLETE    Result Date: 5/4/2022    Left Ventricle: The EF by visual approximation is 55 - 60%. Left ventricle size is normal. Mildly increased wall thickness. Findings consistent with mild concentric hypertrophy. Normal wall motion. Grade I diastolic dysfunction.   Mild tricuspid regurgitation. Estimated PASP 31 mm hg     CARDIAC PROCEDURE    Result Date: 5/6/2022  Risks, benefits, and alternatives and complications of procedure were explained to the patient and appropriate informed consent was obtained prior to the procedure. The patient was brought to the cath lab and was prepped and draped in the usual sterile manner. Moderate sedation was achieved with the appropriate medications. Lidocaine was used to secure local anesthesia. The Left radial artery was cannulated using a modified Seldinger technique and a 6F Spanish sheath was placed. LHC was performed using 5F JR 3.5 catheter under fluoroscopic guidance. After obtaining Left heart pressure catheter was pull back. RCA was cannulated with JR 3.5 catheter. RCA angiogram was done in different views. Catheter was exchanged over 0.035 inch J tip guide wire. 5F FL3.5 catheter was advanced under fluoroscopic guidance and Left main was cannulated. Angiogram of left coronary artery was performed in different views. Catheter was exchanged over 0.035 inch J tip guide wire. EBU 3 advanced under fluroscopic guidance and Left main was cannulated. After achieving therapeutic aPTT The LCx lesion was crossed with run through wire. Lesion was pre dilated with 2.5X12 mm pre dil balloon and PCI done with Resolute 3.0X18 mm stent and post dilated with 3.0X12 mm post dil ballon with good result. The run through wire was pulled from LCx and introduced in LAD. The LAD lesion was crossed with run through wire.  Lesion was pre dilated with 2.5X12 mm pre dil balloon and PCI done with Xience 3.0X18 mm stent and post dilated with 3.5X15 mm post dil ballon with good result. Post procedure angiogram done. Guide wire was removed. EBU was removed over J wire. Radial band applied and radial sheath was removed and patient transferred to recovery area. [facility], Coronary angiogram and PCI of proximal LAD and proximal LCx done via left radial approach. Coronary anatomy described below with noted coronary artery disease. PCI of pLAD and pLCx done with ALANIS with good result. LV gram was not done. LVEDP 27 mm hg. No significant gradient on pull back. Patient tolerated procedure well. Scant blood loss. No complications. No specimen removed. Recommendation: Medication considered: Aspirin, beta blocker, ACEI, Nitrates and statin Brilinta 180 mg one dose followed by 90 mg twice a day Integrilin for 6 hours Dual antiplatelet therapy for at least 12 months due to non-STEMI and use of drug-eluting stent CAD risk factor education Diet education Control cholesterol Blood pressure control Exercise education: Age and functional status appropriate. Cardiac rehab referral done    DUPLEX LOWER EXT VENOUS BILAT    Result Date: 5/23/2022  · No evidence of deep vein thrombosis in the right lower extremity. · No evidence of deep vein thrombosis in the left lower extremity. · Technically difficult study due to patient's tolerance. unable to evaluate color and pulse doppler study on lt femoral vein mid & dist, pop v and ptv. ASSESSMENT/IMPRESSION:  The clinical presentation seems to be consistent with diabetic peripheral neuropathy as well as restless leg syndrome. The patient benefited from gabapentin in the past.  I will start gabapentin 300 mg twice a day, which she was using with good control of her symptoms. RECOMMENDATIONS:  1.  Start gabapentin 300 mg twice a day  2. Discontinue ropinirole  3. The patient is going to be followed in neurology as outpatient. She may need further work-up with EMG.   I am also going to request peripheral neuropathy lab work today to rule out other possible etiologies apart from diabetes. I will follow the patient as outpatient.  Please do not hesitate to return with any questions.    ------------------------------------  Ben Wilhelm MD  5/23/2022  10:24 AM

## 2022-05-23 NOTE — H&P
History & Physical    Patient: Angeles Quinones MRN: [id number]  CSN: [id number]    YOB: 1955  Age: 77 y.o. Sex: female      DOA: 5/22/2022    Chief Complaint:   Chief Complaint   Patient presents with    Chest Pain          HPI:     Angeles Quinones is a 77 y.o.  female who has history of diabetes, asthma, vaping, coronary artery disease recent stenting 2 weeks ago presents to the emergency with worsening chest pain and worsening restless leg syndrome as well as leg pain. In the emergency room Dopplers were done of her legs which showed no DVT but a CTA was positive for bilateral PE. Patient states she has been compliant with her Brilinta. In the emergency room she was given 3 mg of Ativan and is somewhat dyspneic and lethargic at this time she states that for the last 3 days she has not been able to sleep due to her legs moving so much she takes Requip 5 mg at bedtime no new medicines were introduced according to patient. Past Medical History:   Diagnosis Date    Asthma     Diabetes ([address]     Hypertension     Hypertension, accelerated 4/13/2016    Positive cardiac stress test 4/14/2016    Precordial pain 4/13/2016    Psychiatric disorder     depression       Past Surgical History:   Procedure Laterality Date    HX ORTHOPAEDIC      neck surgery    HX UROLOGICAL      bladder surgery       No family history on file. Social History     Socioeconomic History    Marital status:    Tobacco Use    Smoking status: Never Smoker    Smokeless tobacco: Never Used   Vaping Use    Vaping Use: Never used   Substance and Sexual Activity    Alcohol use: No    Drug use: Never       Prior to Admission medications    Medication Sig Start Date End Date Taking? Authorizing Provider   metoprolol tartrate (LOPRESSOR) 25 mg tablet Take 0.5 Tablets by mouth every twelve (12) hours.  5/6/22   Silke Allison MD   nitroglycerin (NITROSTAT) 0.4 mg SL tablet 1 Tablet by SubLINGual route every five (5) minutes as needed for Chest Pain. Up to 3 doses. 5/6/22   Alina Gonzalez MD   levothyroxine (SYNTHROID) 25 mcg tablet 25 mcg. 2/25/20   Tamar Panchal MD   rOPINIRole (Requip) 5 mg tablet Take 1 Tab by mouth nightly. 12/30/20   Taylor Jefferson MD   polyethylene glycol (Miralax) 17 gram/dose powder Take 17 g by mouth daily. 1 tablespoon with 8 oz of water daily 12/30/20   Taylor Jefferson MD   cyclobenzaprine (FLEXERIL) 10 mg tablet Take 1 Tab by mouth three (3) times daily as needed for Muscle Spasm(s). 5/1/19   Marcin Davidson MD   aspirin delayed-release 81 mg tablet Take 1 Tab by mouth daily. 4/15/16   Alla Barrera MD   ticagrelor (BRILINTA) 90 mg tablet Take 1 Tab by mouth two (2) times a day. 4/15/16   Alla Barrera MD   metFORMIN (GLUCOPHAGE) 500 mg tablet Take 500 mg by mouth daily (with breakfast). OtherTamar MD   PARoxetine (PAXIL) 40 mg tablet Take 40 mg by mouth every evening. Provider, Historical   cycloSPORINE (RESTASIS) 0.05 % ophthalmic emulsion Administer 1 Drop to both eyes every twelve (12) hours. Provider, Historical   budesonide-formoterol (SYMBICORT) 80-4.5 mcg/actuation HFAA inhaler Take 2 Puffs by inhalation two (2) times a day. Provider, Historical   albuterol (PROVENTIL HFA, VENTOLIN HFA, PROAIR HFA) 90 mcg/actuation inhaler Take 2 Puffs by inhalation every four (4) hours as needed for Wheezing. Provider, Historical   vitamin E (AQUA GEMS) 400 unit capsule Take 400 Units by mouth daily. Provider, Historical   vit a,c & e-lutein-minerals (OCUVITE) tablet 1 Tab daily. Provider, Historical   potassium 99 mg tablet Take 99 mg by mouth daily. Provider, Historical       Allergies   Allergen Reactions    Statins-Hmg-Coa Reductase Inhibitors Other (comments)         Review of Systems  GENERAL: Patient alert, awake and oriented times 3, able to communicate full sentences and not in distress.   But she is moving around a lot and picking at her IV  And kicking her legs  HEENT: No change in vision, no earache, tinnitus, sore throat or sinus congestion. NECK: No pain or stiffness. PULMONARY: No shortness of breath, cough or wheeze. Cardiovascular: no pnd or orthopnea, n+CP  GASTROINTESTINAL: No abdominal pain, nausea, vomiting or diarrhea, melena or bright red blood per rectum. GENITOURINARY: No urinary frequency, urgency, hesitancy or dysuria. MUSCULOSKELETAL: No joint or muscle pain, no back pain, no recent trauma. DERMATOLOGIC: No rash, no itching, no lesions. ENDOCRINE: No polyuria, polydipsia, no heat or cold intolerance. No recent change in weight. HEMATOLOGICAL: No anemia or easy bruising or bleeding. NEUROLOGIC: No headache, seizures, numbness, tingling or weakness. Physical Exam:     Physical Exam:  Visit Vitals  /78   Pulse 97   Temp 98.4 °F (36.9 °C)   Resp 21   Ht 5' 1\" (1.549 m)   Wt 91.7 kg (202 lb 3.2 oz)   SpO2 92%   BMI 38.21 kg/m²      O2 Device: None (Room air)    Temp (24hrs), Av.4 °F (36.9 °C), Min:98.4 °F (36.9 °C), Max:98.4 °F (36.9 °C)    No intake/output data recorded. No intake/output data recorded. General:  Alert, cooperative, no distress, appears stated age. Head: Normocephalic, without obvious abnormality, atraumatic. Eyes:  Conjunctivae/corneas clear. PERRL, EOMs intact. Nose: Nares normal. No drainage or sinus tenderness. Neck: Supple, symmetrical, trachea midline, no adenopathy, thyroid: no enlargement, no carotid bruit and no JVD. Lungs:   Clear to auscultation bilaterally. Heart:  Regular rate and rhythm, S1, S2 normal.     Abdomen: Soft, non-tender. Bowel sounds normal.    Extremities: Extremities normal, atraumatic, no cyanosis or edema. Pulses: 2+ and symmetric all extremities. Skin:  No rashes or lesions   Neurologic: AAOx3, No focal motor or sensory deficit.   Akathisia with frequent movement of her legs lethargic but answers questions and arousable somewhat sedated from Ativan       Labs Reviewed: All lab results for the last 24 hours reviewed. and EKG    Procedures/imaging: see electronic medical records for all procedures/Xrays and details which were not copied into this note but were reviewed prior to creation of Plan      Assessment/Plan     Active Problems:    1. Pulmonary Embolism  Heparin drip  For now change to NOAC    2. CAD s/p stenting  Cont Brillinta   Trop neg     3. HTN  Resume meds    4. DM  Sliding scale insulin  Hold oral agents     5. Ashtma  Continue home inhaler  No steroids    6. UTI  Check cultures  IV Rocephin    7.  RLS  requip now  And Valium now  I will get a neurology consult      DVT/GI Prophylaxis: heparin and Protonix        Hermes Powell MD  5/23/2022 4:15 AM

## 2022-05-23 NOTE — PROGRESS NOTES
Hospitalist Progress Note    Patient: Fortunato Reddy MRN: [id number]  CSN: [id number]    YOB: 1955  Age: 77 y.o. Sex: female    DOA: 5/22/2022 LOS:  LOS: 0 days          Chief Complaint:    Chest pain      Assessment/Plan     Pulmonary Embolism, age indeterminate  No DVT on US  Heparin drip    CAD s/p stenting, beginning of this month  Continue Brillinta   Trop negative  Apparently cannot take statins  Consult with cardiology     HTN  Resume meds-metoprolol     IDDM  Sliding scale insulin  Basal insulin  Hold oral agents      Asthma  Continue home inhaler  stable     UTI  Check cultures  IV Rocephin     RLS  Neurology recommends neurontin    PT evaluation  Tele monitoring      Disposition :home 2-3 days  Patient Active Problem List   Diagnosis Code    DM II (diabetes mellitus, type II), controlled ([facility] E11.9    Generalized anxiety disorder F41.1    Coronary artery disease involving native coronary artery I25.10    Non-STEMI (non-ST elevated myocardial infarction) ([facility] I21.4    HTN (hypertension) I10    Hx of heart artery stent Z95.5    Pulmonary edema J81.1    Pulmonary embolus (HCC) I26.99    Restless leg syndrome G25.81       Subjective:    Feeling better  Was very tired  Did not sleep well last night  No CP or SOB this am    Review of systems:    Constitutional: denies fevers, chills  Respiratory: denies SOB, cough  Cardiovascular: denies chest pain, palpitations  Gastrointestinal: denies nausea, vomiting      Vital signs/Intake and Output:  Visit Vitals  /62   Pulse 91   Temp 98.1 °F (36.7 °C)   Resp 16   Ht 5' 1\" (1.549 m)   Wt 91.7 kg (202 lb 3.2 oz)   SpO2 95%   BMI 38.21 kg/m²     Current Shift:  No intake/output data recorded.   Last three shifts:  05/21 1901 - 05/23 0700  In: 50.1 [I.V.:50.1]  Out: -     Exam:    General: OW female, alert, NAD, OX3  Head/Neck: NCAT, supple, No masses, No lymphadenopathy  CVS:Regular rate and rhythm, no M/R/G, S1/S2 heard, no thrill  Lungs:Clear to auscultation bilaterally, no wheezes, rhonchi, or rales  Abdomen: Soft, Nontender, No distention, Normal Bowel sounds  Extremities: No C/C/E, pulses palpable 2+  Neuro:grossly normal , follows commands  Psych:appropriate                Labs: Results:       Chemistry Recent Labs     05/22/22 2219   *      K 4.3      CO2 25   BUN 22*   CREA 0.89   CA 8.7   AGAP 8   BUCR 25*      TP 6.7   ALB 3.3*   GLOB 3.4   AGRAT 1.0      CBC w/Diff Recent Labs     05/22/22 2219   WBC 8.3   RBC 4.35   HGB 12.3   HCT 37.2      GRANS 70   LYMPH 19*   EOS 2      Cardiac Enzymes Recent Labs     05/22/22 2219         Coagulation Recent Labs     05/22/22 2219   PTP 11.5   INR 0.8   APTT 25.1       Lipid Panel Lab Results   Component Value Date/Time    Cholesterol, total 205 (H) 05/04/2022 03:39 AM    HDL Cholesterol 57 05/04/2022 03:39 AM    LDL, calculated 123.6 (H) 05/04/2022 03:39 AM    VLDL, calculated 24.4 05/04/2022 03:39 AM    Triglyceride 122 05/04/2022 03:39 AM    CHOL/HDL Ratio 3.6 05/04/2022 03:39 AM      BNP No results for input(s): BNPP in the last 72 hours.    Liver Enzymes Recent Labs     05/22/22 2219   TP 6.7   ALB 3.3*         Thyroid Studies Lab Results   Component Value Date/Time    TSH 3.70 05/04/2022 03:39 AM        Procedures/imaging: see electronic medical records for all procedures/Xrays and details which were not copied into this note but were reviewed prior to creation of Marvin Funez MD

## 2022-05-23 NOTE — ED TRIAGE NOTES
Pt arrive to ed via ems with c/o chest pain. Per report from ems, chest started this evening. PMHX of cardiac stents. 2 stent was place 2 weeks ago, pt have 2 prior stents (4 total  ). Pt in ed bed, able to make needs known. Pt was given 324 mg aspirin, 20 g IV was started.

## 2022-05-23 NOTE — PROGRESS NOTES
Problem: Diabetes Self-Management  Goal: *Disease process and treatment process  Description: Define diabetes and identify own type of diabetes; list 3 options for treating diabetes. Outcome: Progressing Towards Goal  Goal: *Incorporating nutritional management into lifestyle  Description: Describe effect of type, amount and timing of food on blood glucose; list 3 methods for planning meals. Outcome: Progressing Towards Goal  Goal: *Incorporating physical activity into lifestyle  Description: State effect of exercise on blood glucose levels. Outcome: Progressing Towards Goal  Goal: *Developing strategies to promote health/change behavior  Description: Define the ABC's of diabetes; identify appropriate screenings, schedule and personal plan for screenings. Outcome: Progressing Towards Goal  Goal: *Using medications safely  Description: State effect of diabetes medications on diabetes; name diabetes medication taking, action and side effects. Outcome: Progressing Towards Goal  Goal: *Monitoring blood glucose, interpreting and using results  Description: Identify recommended blood glucose targets  and personal targets. Outcome: Progressing Towards Goal  Goal: *Prevention, detection, treatment of acute complications  Description: List symptoms of hyper- and hypoglycemia; describe how to treat low blood sugar and actions for lowering  high blood glucose level. Outcome: Progressing Towards Goal  Goal: *Prevention, detection and treatment of chronic complications  Description: Define the natural course of diabetes and describe the relationship of blood glucose levels to long term complications of diabetes.   Outcome: Progressing Towards Goal  Goal: *Developing strategies to address psychosocial issues  Description: Describe feelings about living with diabetes; identify support needed and support network  Outcome: Progressing Towards Goal  Goal: *Insulin pump training  Outcome: Progressing Towards Goal  Goal: *Sick day guidelines  Outcome: Progressing Towards Goal  Goal: *Patient Specific Goal (EDIT GOAL, INSERT TEXT)  Outcome: Progressing Towards Goal     Problem: Patient Education: Go to Patient Education Activity  Goal: Patient/Family Education  Outcome: Progressing Towards Goal     Problem: Falls - Risk of  Goal: *Absence of Falls  Description: Document Karlie Landon Fall Risk and appropriate interventions in the flowsheet. Outcome: Progressing Towards Goal  Note: Fall Risk Interventions:  Mobility Interventions: Patient to call before getting OOB,OT consult for ADLs,PT Consult for mobility concerns         Medication Interventions: Teach patient to arise slowly,Patient to call before getting OOB    Elimination Interventions: Bed/chair exit alarm,Call light in reach,Patient to call for help with toileting needs,Toileting schedule/hourly rounds              Problem: Patient Education: Go to Patient Education Activity  Goal: Patient/Family Education  Outcome: Progressing Towards Goal     Problem: Airway Clearance - Ineffective  Goal: *Patent airway  Outcome: Progressing Towards Goal  Goal: *Absence of airway secretions  Outcome: Progressing Towards Goal  Goal: *Able to cough effectively  Outcome: Progressing Towards Goal  Goal: *PALLIATIVE CARE:  Alleviation of secretions, cough and/or nasal congestion  Outcome: Progressing Towards Goal     Problem: Patient Education: Go to Patient Education Activity  Goal: Patient/Family Education  Outcome: Progressing Towards Goal     Problem:  Activity Intolerance  Goal: *Oxygen saturation during activity within specified parameters  Outcome: Progressing Towards Goal  Goal: *Able to remain out of bed as prescribed  Outcome: Progressing Towards Goal     Problem: Patient Education: Go to Patient Education Activity  Goal: Patient/Family Education  Outcome: Progressing Towards Goal     Problem: Anxiety  Goal: *Alleviation of anxiety  Outcome: Progressing Towards Goal  Goal: *Alleviation of anxiety (Palliative Care)  Outcome: Progressing Towards Goal     Problem: Patient Education: Go to Patient Education Activity  Goal: Patient/Family Education  Outcome: Progressing Towards Goal

## 2022-05-24 VITALS
RESPIRATION RATE: 16 BRPM | HEIGHT: 61 IN | HEART RATE: 69 BPM | OXYGEN SATURATION: 100 % | DIASTOLIC BLOOD PRESSURE: 68 MMHG | WEIGHT: 202 LBS | BODY MASS INDEX: 38.14 KG/M2 | TEMPERATURE: 98.2 F | SYSTOLIC BLOOD PRESSURE: 143 MMHG

## 2022-05-24 LAB
ALBUMIN SERPL-MCNC: 3.3 G/DL (ref 3.4–5)
ALBUMIN/GLOB SERPL: 1 {RATIO} (ref 0.8–1.7)
ALP SERPL-CCNC: 82 U/L (ref 45–117)
ALT SERPL-CCNC: 31 U/L (ref 13–56)
ANA SER QL: NEGATIVE
ANION GAP SERPL CALC-SCNC: 6 MMOL/L (ref 3–18)
APTT PPP: 105.8 SEC (ref 23–36.4)
APTT PPP: 121.5 SEC (ref 23–36.4)
AST SERPL-CCNC: 20 U/L (ref 10–38)
ATRIAL RATE: 94 BPM
BASOPHILS # BLD: 0.1 K/UL (ref 0–0.1)
BASOPHILS NFR BLD: 1 % (ref 0–2)
BILIRUB SERPL-MCNC: 0.3 MG/DL (ref 0.2–1)
BUN SERPL-MCNC: 17 MG/DL (ref 7–18)
BUN/CREAT SERPL: 22 (ref 12–20)
CALCIUM SERPL-MCNC: 8.9 MG/DL (ref 8.5–10.1)
CALCULATED P AXIS, ECG09: 45 DEGREES
CALCULATED R AXIS, ECG10: 39 DEGREES
CALCULATED T AXIS, ECG11: 61 DEGREES
CHLORIDE SERPL-SCNC: 107 MMOL/L (ref 100–111)
CO2 SERPL-SCNC: 28 MMOL/L (ref 21–32)
COPPER SERPL-MCNC: 112 UG/DL (ref 80–158)
CREAT SERPL-MCNC: 0.78 MG/DL (ref 0.6–1.3)
DIAGNOSIS, 93000: NORMAL
DIFFERENTIAL METHOD BLD: NORMAL
EOSINOPHIL # BLD: 0.2 K/UL (ref 0–0.4)
EOSINOPHIL NFR BLD: 3 % (ref 0–5)
ERYTHROCYTE [DISTWIDTH] IN BLOOD BY AUTOMATED COUNT: 14 % (ref 11.6–14.5)
GLOBULIN SER CALC-MCNC: 3.3 G/DL (ref 2–4)
GLUCOSE BLD STRIP.AUTO-MCNC: 138 MG/DL (ref 70–110)
GLUCOSE BLD STRIP.AUTO-MCNC: 197 MG/DL (ref 70–110)
GLUCOSE SERPL-MCNC: 146 MG/DL (ref 74–99)
HCT VFR BLD AUTO: 37.9 % (ref 35–45)
HGB BLD-MCNC: 12.1 G/DL (ref 12–16)
IMM GRANULOCYTES # BLD AUTO: 0 K/UL (ref 0–0.04)
IMM GRANULOCYTES NFR BLD AUTO: 0 % (ref 0–0.5)
LYMPHOCYTES # BLD: 1.7 K/UL (ref 0.9–3.6)
LYMPHOCYTES NFR BLD: 29 % (ref 21–52)
MAGNESIUM SERPL-MCNC: 2.2 MG/DL (ref 1.6–2.6)
MCH RBC QN AUTO: 28 PG (ref 24–34)
MCHC RBC AUTO-ENTMCNC: 31.9 G/DL (ref 31–37)
MCV RBC AUTO: 87.7 FL (ref 78–100)
MONOCYTES # BLD: 0.5 K/UL (ref 0.05–1.2)
MONOCYTES NFR BLD: 9 % (ref 3–10)
NEUTS SEG # BLD: 3.4 K/UL (ref 1.8–8)
NEUTS SEG NFR BLD: 58 % (ref 40–73)
NRBC # BLD: 0 K/UL (ref 0–0.01)
NRBC BLD-RTO: 0 PER 100 WBC
P-R INTERVAL, ECG05: 172 MS
PLATELET # BLD AUTO: 270 K/UL (ref 135–420)
PMV BLD AUTO: 9.4 FL (ref 9.2–11.8)
POTASSIUM SERPL-SCNC: 4.1 MMOL/L (ref 3.5–5.5)
PROT SERPL-MCNC: 6.6 G/DL (ref 6.4–8.2)
Q-T INTERVAL, ECG07: 364 MS
QRS DURATION, ECG06: 74 MS
QTC CALCULATION (BEZET), ECG08: 455 MS
RBC # BLD AUTO: 4.32 M/UL (ref 4.2–5.3)
SODIUM SERPL-SCNC: 141 MMOL/L (ref 136–145)
TSH SERPL DL<=0.05 MIU/L-ACNC: 1.48 UIU/ML (ref 0.36–3.74)
VENTRICULAR RATE, ECG03: 94 BPM
WBC # BLD AUTO: 5.8 K/UL (ref 4.6–13.2)

## 2022-05-24 PROCEDURE — 74011250636 HC RX REV CODE- 250/636: Performed by: INTERNAL MEDICINE

## 2022-05-24 PROCEDURE — 94640 AIRWAY INHALATION TREATMENT: CPT

## 2022-05-24 PROCEDURE — 85025 COMPLETE CBC W/AUTO DIFF WBC: CPT

## 2022-05-24 PROCEDURE — 94760 N-INVAS EAR/PLS OXIMETRY 1: CPT

## 2022-05-24 PROCEDURE — 84443 ASSAY THYROID STIM HORMONE: CPT

## 2022-05-24 PROCEDURE — 74011000258 HC RX REV CODE- 258: Performed by: INTERNAL MEDICINE

## 2022-05-24 PROCEDURE — 74011636637 HC RX REV CODE- 636/637: Performed by: INTERNAL MEDICINE

## 2022-05-24 PROCEDURE — 85730 THROMBOPLASTIN TIME PARTIAL: CPT

## 2022-05-24 PROCEDURE — 36415 COLL VENOUS BLD VENIPUNCTURE: CPT

## 2022-05-24 PROCEDURE — 82962 GLUCOSE BLOOD TEST: CPT

## 2022-05-24 PROCEDURE — 80053 COMPREHEN METABOLIC PANEL: CPT

## 2022-05-24 PROCEDURE — 74011250637 HC RX REV CODE- 250/637: Performed by: FAMILY MEDICINE

## 2022-05-24 PROCEDURE — 83735 ASSAY OF MAGNESIUM: CPT

## 2022-05-24 PROCEDURE — 74011250637 HC RX REV CODE- 250/637: Performed by: INTERNAL MEDICINE

## 2022-05-24 PROCEDURE — 74011000250 HC RX REV CODE- 250: Performed by: INTERNAL MEDICINE

## 2022-05-24 PROCEDURE — 74011250637 HC RX REV CODE- 250/637: Performed by: PSYCHIATRY & NEUROLOGY

## 2022-05-24 RX ORDER — CIPROFLOXACIN 250 MG/1
250 TABLET, FILM COATED ORAL 2 TIMES DAILY
Qty: 12 TABLET | Refills: 0 | Status: SHIPPED | OUTPATIENT
Start: 2022-05-24 | End: 2022-05-30

## 2022-05-24 RX ORDER — GABAPENTIN 300 MG/1
300 CAPSULE ORAL 2 TIMES DAILY
Qty: 60 CAPSULE | Refills: 0 | Status: SHIPPED | OUTPATIENT
Start: 2022-05-24

## 2022-05-24 RX ADMIN — ARFORMOTEROL TARTRATE 15 MCG: 15 SOLUTION RESPIRATORY (INHALATION) at 07:35

## 2022-05-24 RX ADMIN — SODIUM CHLORIDE, PRESERVATIVE FREE 5 ML: 5 INJECTION INTRAVENOUS at 05:16

## 2022-05-24 RX ADMIN — IPRATROPIUM BROMIDE 0.5 MG: 0.5 SOLUTION RESPIRATORY (INHALATION) at 14:52

## 2022-05-24 RX ADMIN — SODIUM CHLORIDE, PRESERVATIVE FREE 10 ML: 5 INJECTION INTRAVENOUS at 14:00

## 2022-05-24 RX ADMIN — IPRATROPIUM BROMIDE 0.5 MG: 0.5 SOLUTION RESPIRATORY (INHALATION) at 07:35

## 2022-05-24 RX ADMIN — INSULIN GLARGINE 18 UNITS: 100 INJECTION, SOLUTION SUBCUTANEOUS at 08:46

## 2022-05-24 RX ADMIN — Medication 2 UNITS: at 12:10

## 2022-05-24 RX ADMIN — METOPROLOL TARTRATE 12.5 MG: 25 TABLET, FILM COATED ORAL at 08:43

## 2022-05-24 RX ADMIN — CEFTRIAXONE 1 G: 1 INJECTION, POWDER, FOR SOLUTION INTRAMUSCULAR; INTRAVENOUS at 06:23

## 2022-05-24 RX ADMIN — ACETAMINOPHEN 650 MG: 325 TABLET ORAL at 16:18

## 2022-05-24 RX ADMIN — GABAPENTIN 300 MG: 300 CAPSULE ORAL at 08:44

## 2022-05-24 RX ADMIN — TICAGRELOR 90 MG: 90 TABLET ORAL at 08:44

## 2022-05-24 RX ADMIN — LEVOTHYROXINE SODIUM 25 MCG: 0.03 TABLET ORAL at 06:22

## 2022-05-24 RX ADMIN — APIXABAN 10 MG: 5 TABLET, FILM COATED ORAL at 10:33

## 2022-05-24 RX ADMIN — BUDESONIDE 250 MCG: 0.25 INHALANT RESPIRATORY (INHALATION) at 07:35

## 2022-05-24 NOTE — DISCHARGE SUMMARY
Discharge Summary    Patient: Kirsten Gottlieb MRN: [id number]  CSN: [id number]    YOB: 1955  Age: 77 y.o.   Sex: female    DOA: 5/22/2022 LOS:  LOS: 1 day   Discharge Date:      Primary Care Provider:  Titi Lobato MD    Admission Diagnoses: Pulmonary edema [J81.1]    Discharge Diagnoses:    Problem List as of 5/24/2022 Date Reviewed: 5/23/2022          Codes Class Noted - Resolved    Pulmonary edema ICD-10-CM: J81.1  ICD-9-CM: 419  5/23/2022 - Present        * (Principal) Pulmonary embolus ([facility] ICD-10-CM: I26.99  ICD-9-CM: 415.19  5/23/2022 - Present        Restless leg syndrome ICD-10-CM: G25.81  ICD-9-CM: 333.94  5/23/2022 - Present        Non-STEMI (non-ST elevated myocardial infarction) ([facility] ICD-10-CM: I21.4  ICD-9-CM: 410.70  5/4/2022 - Present        HTN (hypertension) ICD-10-CM: I10  ICD-9-CM: 401.9  5/4/2022 - Present        Hx of heart artery stent ICD-10-CM: Z95.5  ICD-9-CM: V45.82  5/4/2022 - Present        Coronary artery disease involving native coronary artery ICD-10-CM: I25.10  ICD-9-CM: 414.01  4/15/2016 - Present    Overview Signed 4/15/2016 11:52 AM by Rd Jones MD     ALANIS stent to proximal-mid left anterior descending                  DM II (diabetes mellitus, type II), controlled ([facility] ICD-10-CM: E11.9  ICD-9-CM: 250.00  4/13/2016 - Present        Generalized anxiety disorder ICD-10-CM: F41.1  ICD-9-CM: 300.02  4/13/2016 - Present        RESOLVED: Positive cardiac stress test ICD-10-CM: R94.39  ICD-9-CM: 794.39  4/14/2016 - 5/4/2022        RESOLVED: Precordial pain ICD-10-CM: R07.2  ICD-9-CM: 786.51  4/13/2016 - 5/4/2022        RESOLVED: Hypertension, accelerated ICD-10-CM: I10  ICD-9-CM: 401.0  4/13/2016 - 5/4/2022              Discharge Medications:     Current Discharge Medication List      START taking these medications    Details   apixaban (ELIQUIS) 5 mg (74 tabs) starter pack Take 10 mg (two 5 mg tablets) by mouth twice a day for 7 days Followed by 5 mg (one 5 mg tablet) by mouth twice a day  Qty: 1 Dose Pack, Refills: 0  Start date: 5/24/2022      ciprofloxacin HCl (Cipro) 250 mg tablet Take 1 Tablet by mouth two (2) times a day for 6 days. Qty: 12 Tablet, Refills: 0  Start date: 5/24/2022, End date: 5/30/2022      gabapentin (NEURONTIN) 300 mg capsule Take 1 Capsule by mouth two (2) times a day. Max Daily Amount: 600 mg. Qty: 60 Capsule, Refills: 0  Start date: 5/24/2022    Associated Diagnoses: Restless leg syndrome      apixaban (Eliquis) 5 mg tablet Take 1 Tablet by mouth two (2) times a day. Qty: 60 Tablet, Refills: 0  Start date: 5/24/2022         CONTINUE these medications which have NOT CHANGED    Details   metoprolol tartrate (LOPRESSOR) 25 mg tablet Take 0.5 Tablets by mouth every twelve (12) hours. Qty: 60 Tablet, Refills: 0      nitroglycerin (NITROSTAT) 0.4 mg SL tablet 1 Tablet by SubLINGual route every five (5) minutes as needed for Chest Pain. Up to 3 doses. Qty: 20 Each, Refills: 0      levothyroxine (SYNTHROID) 25 mcg tablet 25 mcg.      polyethylene glycol (Miralax) 17 gram/dose powder Take 17 g by mouth daily. 1 tablespoon with 8 oz of water daily  Qty: 300 g, Refills: 0      cyclobenzaprine (FLEXERIL) 10 mg tablet Take 1 Tab by mouth three (3) times daily as needed for Muscle Spasm(s). Qty: 20 Tab, Refills: 0      aspirin delayed-release 81 mg tablet Take 1 Tab by mouth daily. Qty: 30 Tab, Refills: 0      ticagrelor (BRILINTA) 90 mg tablet Take 1 Tab by mouth two (2) times a day. Qty: 60 Tab, Refills: 0      metFORMIN (GLUCOPHAGE) 500 mg tablet Take 500 mg by mouth daily (with breakfast). PARoxetine (PAXIL) 40 mg tablet Take 40 mg by mouth every evening. cycloSPORINE (RESTASIS) 0.05 % ophthalmic emulsion Administer 1 Drop to both eyes every twelve (12) hours. budesonide-formoterol (SYMBICORT) 80-4.5 mcg/actuation HFAA inhaler Take 2 Puffs by inhalation two (2) times a day.       albuterol (PROVENTIL HFA, VENTOLIN HFA, PROAIR HFA) 90 mcg/actuation inhaler Take 2 Puffs by inhalation every four (4) hours as needed for Wheezing. vitamin E (AQUA GEMS) 400 unit capsule Take 400 Units by mouth daily. vit a,c & e-lutein-minerals (OCUVITE) tablet 1 Tab daily. potassium 99 mg tablet Take 99 mg by mouth daily. STOP taking these medications       rOPINIRole (Requip) 5 mg tablet Comments:   Reason for Stopping:               Discharge Condition: improved    Procedures : none     Consults: cardiology, neurology       PHYSICAL EXAM   Visit Vitals  BP (!) 143/68   Pulse 69   Temp 98.2 °F (36.8 °C)   Resp 16   Ht 5' 1\" (1.549 m)   Wt 91.6 kg (202 lb)   SpO2 100%   BMI 38.17 kg/m²     General: Awake, cooperative, no acute distress    HEENT: NC, Atraumatic. PERRLA, EOMI. Anicteric sclerae. Lungs:  CTA Bilaterally. No Wheezing/Rhonchi/Rales. Heart:  Regular  rhythm,  No murmur, No Rubs, No Gallops  Abdomen: Soft, Non distended, Non tender. +Bowel sounds,   Extremities: No c/c/e  Psych:   Not anxious or agitated. Neurologic:  No acute neurological deficits. Admission HPI : Fortunato Reddy is a 77 y.o.  female who has history of diabetes, asthma, vaping, coronary artery disease recent stenting 2 weeks ago presents to the emergency with worsening chest pain and worsening restless leg syndrome as well as leg pain. In the emergency room Dopplers were done of her legs which showed no DVT but a CTA was positive for bilateral PE. Patient states she has been compliant with her Brilinta. In the emergency room she was given 3 mg of Ativan and is somewhat dyspneic and lethargic at this time she states that for the last 3 days she has not been able to sleep due to her legs moving so much she takes Requip 5 mg at bedtime no new medicines were introduced according to patient.     Hospital Course :   Pulmonary Embolism, age indeterminate  No DVT on [address]  Cardiology ok with switching to Eliquis  Will only do Eliquis 5mg po bid, not 10mg dose for 7 days, re-wrote rx  No evidence of ACS  RV size and function is normal no evidence of RV strain due to pulmonary embolism     CAD s/p stenting, beginning of this month  Continue Brillinta   Trop negative  Apparently cannot take statins     HTN  Resume meds-metoprolol     IDDM  Sliding scale insulin  Basal insulin  Hold oral agents while inpatient  Hgba1c 7.9, better control of DM needed    Asthma  Continue home inhaler  stable     UTI  Will noitfy pt if urine culture positive      RLS  Start gabapentin 300 mg twice a day  Discontinue ropinirole  The patient is going to be followed in neurology as outpatient. She may need further work-up with EMG per neurology     Needs to see PCP in 3-5 days for MANAGEMENT OF ELEVATED BLOOD GLUCOSE LEVELS AND UNCONTROLLED DIABETES. Scheduled appt with [facility] in 1-2 weeks for RLS. Activity: as tolerated    Diet: ADA    Follow-up: scheduled f/u with neurology and PCP    Disposition: Home    Minutes spent on discharge: 45 minutes       Labs: Results:       Chemistry Recent Labs     05/24/22 0055 05/22/22 2219   * 205*    138   K 4.1 4.3    105   CO2 28 25   BUN 17 22*   CREA 0.78 0.89   CA 8.9 8.7   AGAP 6 8   BUCR 22* 25*   AP 82 111   TP 6.6 6.7   ALB 3.3* 3.3*   GLOB 3.3 3.4   AGRAT 1.0 1.0      CBC w/Diff Recent Labs     05/24/22 0055 05/22/22 2219   WBC 5.8 8.3   RBC 4.32 4.35   HGB 12.1 12.3   HCT 37.9 37.2    282   GRANS 58 70   LYMPH 29 19*   EOS 3 2      Cardiac Enzymes Recent Labs     05/22/22 2219         Coagulation Recent Labs     05/24/22  0745 05/24/22  0055 05/23/22  0810 05/22/22 2219   PTP  --   --   --  11.5   INR  --   --   --  0.8   APTT 105.8* 121.5*   < > 25.1    < > = values in this interval not displayed.        Lipid Panel Lab Results   Component Value Date/Time    Cholesterol, total 205 (H) 05/04/2022 03:39 AM    HDL Cholesterol 57 05/04/2022 03:39 AM    LDL, calculated 123.6 (H) 05/04/2022 03:39 AM    VLDL, calculated 24.4 05/04/2022 03:39 AM    Triglyceride 122 05/04/2022 03:39 AM    CHOL/HDL Ratio 3.6 05/04/2022 03:39 AM      BNP No results for input(s): BNPP in the last 72 hours. Liver Enzymes Recent Labs     05/24/22  0055   TP 6.6   ALB 3.3*   AP 82      Thyroid Studies Lab Results   Component Value Date/Time    TSH 1.48 05/24/2022 12:55 AM            Significant Diagnostic Studies: CTA CHEST W OR W WO CONT    Result Date: 5/23/2022  EXAM: CTA chest INDICATION: Pain. Shortness of breath. Concern for pulmonary embolism. COMPARISON: None TECHNIQUE: Axial CT imaging from the thoracic inlet through the diaphragm with intravenous contrast. Coronal and sagittal MIP reformats were generated. Dose reduction techniques used: automated exposure control, adjustment of the mAs and/or kVp according to patient size, and iterative reconstruction techniques. Digital imaging and communications in Medicine (DICOM) format image data are available to nonaffiliated external healthcare facilities or entities on a secure, media free, reciprocally searchable basis with patient authorization for at least 12 months after this study. _______________ FINDINGS: EXAM QUALITY: Adequate. PULMONARY ARTERIES: There are apparent filling defects within one right upper lobe pulmonary artery branch and one right lower lobe pulmonary artery branch peripherally. MEDIASTINUM: Normal heart size. No evidence of right heart strain. Aorta is unremarkable. No pericardial effusion. LUNGS: No suspicious nodule or mass. No abnormal opacities. PLEURA: Normal. AIRWAY: Normal. LYMPH NODES: No enlarged nodes. UPPER ABDOMEN: Unremarkable. OTHER: No acute or aggressive osseous abnormalities identified. _______________     Filling defects within one right upper lobe and one right lower lobe peripheral pulmonary artery branches suggests minimal pulmonary embolism of uncertain acuity.  No focal lung consolidation or evidence for significant pleural effusion. XR CHEST PORT    Result Date: 5/22/2022  --------------------------------------------------------------------------- <<<<<<<<<           [facility]           >>>>>>>>> --------------------------------------------------------------------------- CLINICAL HISTORY:  Chest pain. COMPARISON EXAMINATIONS:  May 3, 2022. ---  SINGLE FRONTAL VIEW OF THE CHEST  --- The cardiomediastinal silhouette is stable. There is no focal consolidation or pleural effusion. No significant osseous abnormalities are identified.  --------------    -------------- No evidence for active cardiopulmonary disease. No significant interval change. XR CHEST PORT    Result Date: 5/4/2022  EXAM: XR CHEST PORT. CLINICAL INDICATION/HISTORY: chest pain. -Additional: None. TECHNIQUE: A portable erect AP radiographic view of the chest. COMPARISON: Radiograph of 03/22/2021. _______________ FINDINGS: Lungs and pleural spaces: > Mild central vascular and diffuse interstitial prominence, suggestive of mild pulmonary edema. > No pneumothorax or appreciable significant pleural effusion. Cardiomediastinal silhouette: > Mild prominence of the cardiac silhouette may be entirely artifactual secondary to AP portable technique. Bones: > No acute findings. Other: > None. _______________     Findings suggestive of mild pulmonary edema. _______________     ECHO ADULT COMPLETE    Result Date: 5/23/2022    Left Ventricle: Normal left ventricular systolic function with a visually estimated EF of 55 - 60%. Left ventricle is mildly dilated. Moderate septal thickening. Moderate posterior thickening. Normal wall motion. Normal diastolic function.   Mitral Valve: Mildly thickened leaflet, at the posterior leaflet. Mildly calcified leaflet, at the posterior leaflet.   RV size and function is normal.   RVSP 27 mmHg. ECHO ADULT COMPLETE    Result Date: 5/4/2022    Left Ventricle:  The EF by visual approximation is 55 - 60%. Left ventricle size is normal. Mildly increased wall thickness. Findings consistent with mild concentric hypertrophy. Normal wall motion. Grade I diastolic dysfunction.   Mild tricuspid regurgitation. Estimated PASP 31 mm hg     CARDIAC PROCEDURE    Result Date: 5/6/2022  Risks, benefits, and alternatives and complications of procedure were explained to the patient and appropriate informed consent was obtained prior to the procedure. The patient was brought to the cath lab and was prepped and draped in the usual sterile manner. Moderate sedation was achieved with the appropriate medications. Lidocaine was used to secure local anesthesia. The Left radial artery was cannulated using a modified Seldinger technique and a 6F Cameroonian sheath was placed. LHC was performed using 5F JR 3.5 catheter under fluoroscopic guidance. After obtaining Left heart pressure catheter was pull back. RCA was cannulated with JR 3.5 catheter. RCA angiogram was done in different views. Catheter was exchanged over 0.035 inch J tip guide wire. 5F FL3.5 catheter was advanced under fluoroscopic guidance and Left main was cannulated. Angiogram of left coronary artery was performed in different views. Catheter was exchanged over 0.035 inch J tip guide wire. EBU 3 advanced under fluroscopic guidance and Left main was cannulated. After achieving therapeutic aPTT The LCx lesion was crossed with run through wire. Lesion was pre dilated with 2.5X12 mm pre dil balloon and PCI done with Resolute 3.0X18 mm stent and post dilated with 3.0X12 mm post dil ballon with good result. The run through wire was pulled from LCx and introduced in LAD. The LAD lesion was crossed with run through wire. Lesion was pre dilated with 2.5X12 mm pre dil balloon and PCI done with Xience 3.0X18 mm stent and post dilated with 3.5X15 mm post dil ballon with good result. Post procedure angiogram done. Guide wire was removed. EBU was removed over J wire.  Radial band applied and radial sheath was removed and patient transferred to recovery area. [facility], Coronary angiogram and PCI of proximal LAD and proximal LCx done via left radial approach. Coronary anatomy described below with noted coronary artery disease. PCI of pLAD and pLCx done with ALANIS with good result. LV gram was not done. LVEDP 27 mm hg. No significant gradient on pull back. Patient tolerated procedure well. Scant blood loss. No complications. No specimen removed. Recommendation: Medication considered: Aspirin, beta blocker, ACEI, Nitrates and statin Brilinta 180 mg one dose followed by 90 mg twice a day Integrilin for 6 hours Dual antiplatelet therapy for at least 12 months due to non-STEMI and use of drug-eluting stent CAD risk factor education Diet education Control cholesterol Blood pressure control Exercise education: Age and functional status appropriate. Cardiac rehab referral done    DUPLEX LOWER EXT VENOUS BILAT    Result Date: 5/23/2022  · No evidence of deep vein thrombosis in the right lower extremity. · No evidence of deep vein thrombosis in the left lower extremity. · Technically difficult study due to patient's tolerance. unable to evaluate color and pulse doppler study on lt femoral vein mid & dist, pop v and ptv. No results found for this or any previous visit.         CC: Rabia Wilson MD

## 2022-05-24 NOTE — PROGRESS NOTES
Cardiology Progress Note        Patient: Larissa Gaspar        Sex: female          DOA: 5/22/2022  YOB: 1955      Age:  77 y.o.        LOS:  LOS: 1 day   Assessment/Plan     Principal Problem:    Pulmonary embolus ([address] (5/23/2022)    Active Problems:    Pulmonary edema (5/23/2022)      Restless leg syndrome (5/23/2022)        Plan:    CAD recent PCI  Pulmonary embolism    I have discussed in detail with patient and grandson  I have also discussed with Dr. Caridad Schaeffer  I have recommended Eliquis 5 mg twice daily rather than 10 mg started for 1 week to avoid bleeding with Brilinta 90 mg twice daily  Pulmonary embolism is relatively small no evidence of RV strain  Will continue Eliquis 5 mg twice daily  Continue Brilinta 90 mg twice daily  Follow-up with me in cardiology clinic. Subjective:    cc:  CAD  Pulmonary embolism        REVIEW OF SYSTEMS:     General: No fevers or chills. Cardiovascular: No chest pain or pressure. No palpitations. No ankle swelling  Pulmonary: No SOB, orthopnea, PND  Gastrointestinal: No nausea, vomiting or diarrhea      Objective:      Visit Vitals  BP (!) 143/68   Pulse 69   Temp 98.2 °F (36.8 °C)   Resp 16   Ht 5' 1\" (1.549 m)   Wt 91.6 kg (202 lb)   SpO2 100%   BMI 38.17 kg/m²     Body mass index is 38.17 kg/m². Physical Exam:  General Appearance: Comfortable, not using accessory muscles of respiration. NECK: No JVD, no thyroidomeglay. LUNGS: Clear bilaterally. HEART: S1+S2 audible,    ABD: Non-tender, BS Audible    EXT: No edema, and no cysnosis. VASCULAR EXAM: Pulses are intact. PSYCHIATRIC EXAM: Mood is appropriate.     Medication:  Current Facility-Administered Medications   Medication Dose Route Frequency    apixaban (ELIQUIS) tablet 10 mg  10 mg Oral Q12H    ticagrelor (BRILINTA) tablet 90 mg  90 mg Oral BID    PARoxetine (PAXIL) tablet 40 mg  40 mg Oral QPM    metoprolol tartrate (LOPRESSOR) tablet 12.5 mg  12.5 mg Oral Q12H    nitroglycerin (NITROSTAT) tablet 0.4 mg  0.4 mg SubLINGual Q5MIN PRN    polyethylene glycol (MIRALAX) packet 17 g  17 g Oral DAILY    levothyroxine (SYNTHROID) tablet 25 mcg  25 mcg Oral 6am    sodium chloride (NS) flush 5-40 mL  5-40 mL IntraVENous Q8H    sodium chloride (NS) flush 5-40 mL  5-40 mL IntraVENous PRN    acetaminophen (TYLENOL) tablet 650 mg  650 mg Oral Q6H PRN    Or    acetaminophen (TYLENOL) suppository 650 mg  650 mg Rectal Q6H PRN    polyethylene glycol (MIRALAX) packet 17 g  17 g Oral DAILY PRN    ondansetron (ZOFRAN ODT) tablet 4 mg  4 mg Oral Q8H PRN    Or    ondansetron (ZOFRAN) injection 4 mg  4 mg IntraVENous Q6H PRN    insulin lispro (HUMALOG) injection   SubCUTAneous AC&HS    glucose chewable tablet 16 g  4 Tablet Oral PRN    glucagon (GLUCAGEN) injection 1 mg  1 mg IntraMUSCular PRN    dextrose 10% infusion 0-250 mL  0-250 mL IntraVENous PRN    insulin glargine (LANTUS) injection 18 Units  0.2 Units/kg SubCUTAneous DAILY    cefTRIAXone (ROCEPHIN) 1 g in 0.9% sodium chloride (MBP/ADV) 50 mL MBP  1 g IntraVENous Q24H    arformoteroL (BROVANA) neb solution 15 mcg  15 mcg Nebulization BID RT    And    budesonide (PULMICORT) 250 mcg/2ml nebulizer susp  250 mcg Nebulization BID RT    ipratropium (ATROVENT) 0.02 % nebulizer solution 0.5 mg  0.5 mg Nebulization TID RT    gabapentin (NEURONTIN) capsule 300 mg  300 mg Oral BID               Lab/Data Reviewed:  Procedures/imaging: see electronic medical records for all procedures/Xrays   and details which were not copied into this note but were reviewed prior to creation of Plan       All lab results for the last 24 hours reviewed.      Recent Labs     05/24/22 0055 05/22/22 2219   WBC 5.8 8.3   HGB 12.1 12.3   HCT 37.9 37.2    282     Recent Labs     05/24/22 0055 05/22/22 2219    138   K 4.1 4.3    105   CO2 28 25   * 205*   BUN 17 22*   CREA 0.78 0.89   CA 8.9 8.7 RADIOLOGY:  CT Results  (Last 48 hours)               05/23/22 0000  CTA CHEST W OR W WO CONT Final result    Impression:      Filling defects within one right upper lobe and one right lower lobe peripheral   pulmonary artery branches suggests minimal pulmonary embolism of uncertain   acuity. No focal lung consolidation or evidence for significant pleural effusion. Narrative:  EXAM: CTA chest       INDICATION: Pain. Shortness of breath. Concern for pulmonary embolism. COMPARISON: None       TECHNIQUE: Axial CT imaging from the thoracic inlet through the diaphragm with   intravenous contrast. Coronal and sagittal MIP reformats were generated. Dose   reduction techniques used: automated exposure control, adjustment of the mAs   and/or kVp according to patient size, and iterative reconstruction techniques. Digital imaging and communications in Medicine (DICOM) format image data are   available to nonaffiliated external healthcare facilities or entities on a   secure, media free, reciprocally searchable basis with patient authorization for   at least 12 months after this study. _______________       FINDINGS:       EXAM QUALITY: Adequate. PULMONARY ARTERIES: There are apparent filling defects within one right upper   lobe pulmonary artery branch and one right lower lobe pulmonary artery branch   peripherally. MEDIASTINUM: Normal heart size. No evidence of right heart strain. Aorta is   unremarkable. No pericardial effusion. LUNGS: No suspicious nodule or mass. No abnormal opacities. PLEURA: Normal.       AIRWAY: Normal.       LYMPH NODES: No enlarged nodes. UPPER ABDOMEN: Unremarkable. OTHER: No acute or aggressive osseous abnormalities identified.        _______________               CXR Results  (Last 48 hours)               05/22/22 2228  XR CHEST PORT Final result    Impression:  --------------       No evidence for active cardiopulmonary disease. No significant interval change. Narrative:  ---------------------------------------------------------------------------   <<<<<<<<<           [facility]           >>>>>>>>>    ---------------------------------------------------------------------------       CLINICAL HISTORY:  Chest pain. COMPARISON EXAMINATIONS:  May 3, 2022. ---  SINGLE FRONTAL VIEW OF THE CHEST  ---       The cardiomediastinal silhouette is stable. There is no focal consolidation or   pleural effusion.  No significant osseous abnormalities are identified.             --------------               Cardiology Procedures:   Results for orders placed or performed during the hospital encounter of 05/22/22   EKG, 12 LEAD, INITIAL   Result Value Ref Range    Ventricular Rate 94 BPM    Atrial Rate 94 BPM    P-R Interval 172 ms    QRS Duration 74 ms    Q-T Interval 364 ms    QTC Calculation (Bezet) 455 ms    Calculated P Axis 45 degrees    Calculated R Axis 39 degrees    Calculated T Axis 61 degrees    Diagnosis       Normal sinus rhythm  Normal ECG  When compared with ECG of 05-MAY-2022 15:57,  No significant change was found        Echo Results  (Last 48 hours)    None       Cardiolite (Tc-99m Sestamibi) stress test    Signed By: Kelly De La Cruz MD     May 24, 2022

## 2022-05-24 NOTE — PROGRESS NOTES
Hospitalist Progress Note    Patient: Maryann Baker MRN: [id number]  CSN: [id number]    YOB: 1955  Age: 77 y.o. Sex: female    DOA: 5/22/2022 LOS:  LOS: 1 day          Chief Complaint:    Chest pain      Assessment/Plan     Pulmonary Embolism, age indeterminate  No DVT on [address]  Cardiology ok with switching to Eliquis  DC heparin    CAD s/p stenting, beginning of this month  Continue Brillinta   Trop negative  Apparently cannot take statins     HTN  Resume meds-metoprolol     IDDM  Sliding scale insulin  Basal insulin  Hold oral agents      Asthma  Continue home inhaler  stable     UTI  Check cultures  IV Rocephin     RLS  Neurology recommends neurontin    PT evaluation  Tele monitoring      Disposition: today   Patient Active Problem List   Diagnosis Code    DM II (diabetes mellitus, type II), controlled ([facility] E11.9    Generalized anxiety disorder F41.1    Coronary artery disease involving native coronary artery I25.10    Non-STEMI (non-ST elevated myocardial infarction) ([facility] I21.4    HTN (hypertension) I10    Hx of heart artery stent Z95.5    Pulmonary edema J81.1    Pulmonary embolus (HCC) I26.99    Restless leg syndrome G25.81       Subjective:    Feeling better  Was very tired  Did not sleep well last night  No CP or SOB this am    Review of systems:    Constitutional: denies fevers, chills  Respiratory: denies SOB, cough  Cardiovascular: denies chest pain, palpitations  Gastrointestinal: denies nausea, vomiting      Vital signs/Intake and Output:  Visit Vitals  /65   Pulse 74   Temp 98.4 °F (36.9 °C)   Resp 16   Ht 5' 1\" (1.549 m)   Wt 91.6 kg (202 lb)   SpO2 99%   BMI 38.17 kg/m²     Current Shift:  05/24 0701 - 05/24 1900  In: 240 [P.O.:240]  Out: -   Last three shifts:  05/22 1901 - 05/24 0700  In: 350.1 [P.O.:300;  I.V.:50.1]  Out: -     Exam:    General: OW female, alert, NAD, OX3  Head/Neck: NCAT, supple, No masses, No lymphadenopathy  CVS:Regular rate and rhythm, no M/R/G, S1/S2 heard, no thrill  Lungs:Clear to auscultation bilaterally, no wheezes, rhonchi, or rales  Abdomen: Soft, Nontender, No distention, Normal Bowel sounds  Extremities: No C/C/E, pulses palpable 2+  Neuro:grossly normal , follows commands  Psych:appropriate                Labs: Results:       Chemistry Recent Labs     05/24/22 0055 05/22/22 2219   * 205*    138   K 4.1 4.3    105   CO2 28 25   BUN 17 22*   CREA 0.78 0.89   CA 8.9 8.7   AGAP 6 8   BUCR 22* 25*   AP 82 111   TP 6.6 6.7   ALB 3.3* 3.3*   GLOB 3.3 3.4   AGRAT 1.0 1.0      CBC w/Diff Recent Labs     05/24/22 0055 05/22/22 2219   WBC 5.8 8.3   RBC 4.32 4.35   HGB 12.1 12.3   HCT 37.9 37.2    282   GRANS 58 70   LYMPH 29 19*   EOS 3 2      Cardiac Enzymes Recent Labs     05/22/22 2219         Coagulation Recent Labs     05/24/22  0745 05/24/22 0055 05/23/22  0810 05/22/22 2219   PTP  --   --   --  11.5   INR  --   --   --  0.8   APTT 105.8* 121.5*   < > 25.1    < > = values in this interval not displayed. Lipid Panel Lab Results   Component Value Date/Time    Cholesterol, total 205 (H) 05/04/2022 03:39 AM    HDL Cholesterol 57 05/04/2022 03:39 AM    LDL, calculated 123.6 (H) 05/04/2022 03:39 AM    VLDL, calculated 24.4 05/04/2022 03:39 AM    Triglyceride 122 05/04/2022 03:39 AM    CHOL/HDL Ratio 3.6 05/04/2022 03:39 AM      BNP No results for input(s): BNPP in the last 72 hours.    Liver Enzymes Recent Labs     05/24/22 0055   TP 6.6   ALB 3.3*   AP 82      Thyroid Studies Lab Results   Component Value Date/Time    TSH 1.48 05/24/2022 12:55 AM        Procedures/imaging: see electronic medical records for all procedures/Xrays and details which were not copied into this note but were reviewed prior to creation of Henrry Jackman MD

## 2022-05-24 NOTE — PROGRESS NOTES
1750- Pt DC instructions reviewed with pt, prescriptions x 3 given to patient in written form for cipro, eliquis, and nerontin. Pt verbalized understanding of instructions and importance of filling scripts tonight. Reiterated this to daughter as well. No voiced questions or concerns.

## 2022-05-24 NOTE — ACP (ADVANCE CARE PLANNING)
Advance Care Planning   Advance Care Planning Inpatient Note  [facility] Department    Today's Date: 5/24/2022  Unit: THE [address] CARDIAC/MEDICAL    Received request from [facility]. Upon review of chart and communication with care team, patient's decision making abilities are not in question. Patient was/were present in the room during visit. Goals of ACP Conversation:  Discuss Advance Care planning documents    Health Care Decision Makers:    No healthcare decision makers have been documented. Click here to complete [place] including selection of the Healthcare Decision Maker Relationship (ie \"Primary\")    Summary:  No Decision Maker named by patient at this time    Advance Care Planning Documents (Patient Wishes) on file:  Patient stated she has an ACP at home. Encourage her to upload a copy onto My chart.      Interventions:  Requested patient/family submit existing document(s) for our records: [address] of /Advance Directive appointment of Health care agent  Living Will/ Advance Directive    Care Preferences Communicated:  No    Outcomes/Plan:  ACP Discussion Completed    Kate Benson on 5/24/2022 at 2:06 PM

## 2022-05-24 NOTE — PROGRESS NOTES
conducted a Follow up consultation and Spiritual Assessment for Chucky Nino, who is a 77 y.o.,female. The  provided the following Interventions:  Continued the relationship of care and support. Listened empathically. Offered prayer and assurance of continued prayer on patients behalf. Chart reviewed. The following outcomes were achieved:  Patient expressed gratitude for pastoral care visit. Assessment:  There are no further spiritual or Congregational issues which require Spiritual Care Services interventions at this time. Plan:  Chaplains will continue to follow and will provide pastoral care on an as needed/requested basis.  recommends bedside caregivers page  on duty if patient shows signs of acute spiritual or emotional distress. Rev.  Juan C Gibbs,Certified Respecting Choices Advance Care   Coordinator [place]  [phone]

## 2022-05-25 LAB
BACTERIA SPEC CULT: NORMAL
SERVICE CMNT-IMP: NORMAL

## 2022-05-27 LAB
ALBUMIN SERPL ELPH-MCNC: 3.6 G/DL (ref 2.9–4.4)
ALBUMIN/GLOB SERPL: 1.3 {RATIO} (ref 0.7–1.7)
ALPHA1 GLOB SERPL ELPH-MCNC: 0.2 G/DL (ref 0–0.4)
ALPHA2 GLOB SERPL ELPH-MCNC: 0.8 G/DL (ref 0.4–1)
B-GLOBULIN SERPL ELPH-MCNC: 1 G/DL (ref 0.7–1.3)
GAMMA GLOB SERPL ELPH-MCNC: 1 G/DL (ref 0.4–1.8)
GLOBULIN SER-MCNC: 2.9 G/DL (ref 2.2–3.9)
IGA SERPL-MCNC: 162 MG/DL (ref 87–352)
IGG SERPL-MCNC: 1007 MG/DL (ref 586–1602)
IGM SERPL-MCNC: 76 MG/DL (ref 26–217)
INTERPRETATION SERPL IEP-IMP: NORMAL
M PROTEIN SERPL ELPH-MCNC: NORMAL G/DL
PROT SERPL-MCNC: 6.5 G/DL (ref 6–8.5)

## 2022-05-31 ENCOUNTER — HOSPITAL ENCOUNTER (EMERGENCY)
Age: 67
Discharge: HOME | End: 2022-05-31
Attending: PHYSICIAN ASSISTANT
Payer: MEDICARE

## 2022-05-31 ENCOUNTER — APPOINTMENT (OUTPATIENT)
Dept: CT IMAGING | Age: 67
End: 2022-05-31
Attending: PHYSICIAN ASSISTANT
Payer: MEDICARE

## 2022-05-31 VITALS
RESPIRATION RATE: 15 BRPM | TEMPERATURE: 97.3 F | DIASTOLIC BLOOD PRESSURE: 61 MMHG | BODY MASS INDEX: 38.17 KG/M2 | OXYGEN SATURATION: 98 % | SYSTOLIC BLOOD PRESSURE: 124 MMHG | WEIGHT: 202 LBS | HEART RATE: 83 BPM

## 2022-05-31 DIAGNOSIS — I25.10 CORONARY ARTERY DISEASE INVOLVING NATIVE HEART WITHOUT ANGINA PECTORIS, UNSPECIFIED VESSEL OR LESION TYPE: ICD-10-CM

## 2022-05-31 DIAGNOSIS — Z86.711 HISTORY OF PULMONARY EMBOLISM: ICD-10-CM

## 2022-05-31 DIAGNOSIS — R10.10 UPPER ABDOMINAL PAIN: ICD-10-CM

## 2022-05-31 DIAGNOSIS — K62.5 BRBPR (BRIGHT RED BLOOD PER RECTUM): ICD-10-CM

## 2022-05-31 DIAGNOSIS — R11.2 NAUSEA AND VOMITING, UNSPECIFIED VOMITING TYPE: Primary | ICD-10-CM

## 2022-05-31 DIAGNOSIS — Z79.01 CHRONIC ANTICOAGULATION: ICD-10-CM

## 2022-05-31 LAB
ABO + RH BLD: NORMAL
ALBUMIN SERPL-MCNC: 4.1 G/DL (ref 3.4–5)
ALBUMIN/GLOB SERPL: 1 {RATIO} (ref 0.8–1.7)
ALP SERPL-CCNC: 90 U/L (ref 45–117)
ALT SERPL-CCNC: 40 U/L (ref 13–56)
ANION GAP SERPL CALC-SCNC: 6 MMOL/L (ref 3–18)
APTT PPP: 31.7 SEC (ref 23–36.4)
AST SERPL-CCNC: 23 U/L (ref 10–38)
BASOPHILS # BLD: 0 K/UL (ref 0–0.1)
BASOPHILS NFR BLD: 1 % (ref 0–2)
BILIRUB SERPL-MCNC: 0.7 MG/DL (ref 0.2–1)
BLOOD GROUP ANTIBODIES SERPL: NORMAL
BUN SERPL-MCNC: 15 MG/DL (ref 7–18)
BUN/CREAT SERPL: 18 (ref 12–20)
CALCIUM SERPL-MCNC: 9.8 MG/DL (ref 8.5–10.1)
CHLORIDE SERPL-SCNC: 102 MMOL/L (ref 100–111)
CO2 SERPL-SCNC: 30 MMOL/L (ref 21–32)
CREAT SERPL-MCNC: 0.82 MG/DL (ref 0.6–1.3)
DIFFERENTIAL METHOD BLD: ABNORMAL
EOSINOPHIL # BLD: 0.1 K/UL (ref 0–0.4)
EOSINOPHIL NFR BLD: 1 % (ref 0–5)
ERYTHROCYTE [DISTWIDTH] IN BLOOD BY AUTOMATED COUNT: 13.8 % (ref 11.6–14.5)
GLOBULIN SER CALC-MCNC: 4 G/DL (ref 2–4)
GLUCOSE SERPL-MCNC: 152 MG/DL (ref 74–99)
HCT VFR BLD AUTO: 44.8 % (ref 35–45)
HEMOCCULT STL QL: POSITIVE
HGB BLD-MCNC: 14.4 G/DL (ref 12–16)
IMM GRANULOCYTES # BLD AUTO: 0 K/UL (ref 0–0.04)
IMM GRANULOCYTES NFR BLD AUTO: 0 % (ref 0–0.5)
INR PPP: 1.1 (ref 0.8–1.2)
LIPASE SERPL-CCNC: 182 U/L (ref 73–393)
LYMPHOCYTES # BLD: 1.4 K/UL (ref 0.9–3.6)
LYMPHOCYTES NFR BLD: 18 % (ref 21–52)
MCH RBC QN AUTO: 28 PG (ref 24–34)
MCHC RBC AUTO-ENTMCNC: 32.1 G/DL (ref 31–37)
MCV RBC AUTO: 87 FL (ref 78–100)
MONOCYTES # BLD: 0.5 K/UL (ref 0.05–1.2)
MONOCYTES NFR BLD: 6 % (ref 3–10)
NEUTS SEG # BLD: 5.8 K/UL (ref 1.8–8)
NEUTS SEG NFR BLD: 74 % (ref 40–73)
NRBC # BLD: 0 K/UL (ref 0–0.01)
NRBC BLD-RTO: 0 PER 100 WBC
PLATELET # BLD AUTO: 318 K/UL (ref 135–420)
PMV BLD AUTO: 9 FL (ref 9.2–11.8)
POTASSIUM SERPL-SCNC: 3.9 MMOL/L (ref 3.5–5.5)
PROT SERPL-MCNC: 8.1 G/DL (ref 6.4–8.2)
PROTHROMBIN TIME: 14.4 SEC (ref 11.5–15.2)
RBC # BLD AUTO: 5.15 M/UL (ref 4.2–5.3)
SODIUM SERPL-SCNC: 138 MMOL/L (ref 136–145)
SPECIMEN EXP DATE BLD: NORMAL
TROPONIN-HIGH SENSITIVITY: 24 NG/L (ref 0–54)
WBC # BLD AUTO: 7.8 K/UL (ref 4.6–13.2)

## 2022-05-31 PROCEDURE — 84484 ASSAY OF TROPONIN QUANT: CPT

## 2022-05-31 PROCEDURE — 96374 THER/PROPH/DIAG INJ IV PUSH: CPT

## 2022-05-31 PROCEDURE — 80053 COMPREHEN METABOLIC PANEL: CPT

## 2022-05-31 PROCEDURE — 96375 TX/PRO/DX INJ NEW DRUG ADDON: CPT

## 2022-05-31 PROCEDURE — 86900 BLOOD TYPING SEROLOGIC ABO: CPT

## 2022-05-31 PROCEDURE — 93005 ELECTROCARDIOGRAM TRACING: CPT

## 2022-05-31 PROCEDURE — C9113 INJ PANTOPRAZOLE SODIUM, VIA: HCPCS | Performed by: PHYSICIAN ASSISTANT

## 2022-05-31 PROCEDURE — 82272 OCCULT BLD FECES 1-3 TESTS: CPT

## 2022-05-31 PROCEDURE — 85025 COMPLETE CBC W/AUTO DIFF WBC: CPT

## 2022-05-31 PROCEDURE — 70450 CT HEAD/BRAIN W/O DYE: CPT

## 2022-05-31 PROCEDURE — 85730 THROMBOPLASTIN TIME PARTIAL: CPT

## 2022-05-31 PROCEDURE — 85610 PROTHROMBIN TIME: CPT

## 2022-05-31 PROCEDURE — 96361 HYDRATE IV INFUSION ADD-ON: CPT

## 2022-05-31 PROCEDURE — 83690 ASSAY OF LIPASE: CPT

## 2022-05-31 PROCEDURE — 74011250636 HC RX REV CODE- 250/636: Performed by: PHYSICIAN ASSISTANT

## 2022-05-31 PROCEDURE — 99284 EMERGENCY DEPT VISIT MOD MDM: CPT

## 2022-05-31 RX ORDER — OMEPRAZOLE 20 MG/1
20 CAPSULE, DELAYED RELEASE ORAL DAILY
Qty: 30 CAPSULE | Refills: 0 | Status: SHIPPED | OUTPATIENT
Start: 2022-05-31

## 2022-05-31 RX ORDER — MORPHINE SULFATE 4 MG/ML
4 INJECTION INTRAVENOUS
Status: COMPLETED | OUTPATIENT
Start: 2022-05-31 | End: 2022-05-31

## 2022-05-31 RX ORDER — ONDANSETRON 4 MG/1
4 TABLET, ORALLY DISINTEGRATING ORAL
Qty: 15 TABLET | Refills: 0 | Status: SHIPPED | OUTPATIENT
Start: 2022-05-31

## 2022-05-31 RX ORDER — SODIUM CHLORIDE 9 MG/ML
125 INJECTION, SOLUTION INTRAVENOUS ONCE
Status: COMPLETED | OUTPATIENT
Start: 2022-05-31 | End: 2022-05-31

## 2022-05-31 RX ORDER — PANTOPRAZOLE SODIUM 40 MG/10ML
40 INJECTION, POWDER, LYOPHILIZED, FOR SOLUTION INTRAVENOUS
Status: COMPLETED | OUTPATIENT
Start: 2022-05-31 | End: 2022-05-31

## 2022-05-31 RX ORDER — ONDANSETRON 2 MG/ML
4 INJECTION INTRAMUSCULAR; INTRAVENOUS
Status: COMPLETED | OUTPATIENT
Start: 2022-05-31 | End: 2022-05-31

## 2022-05-31 RX ADMIN — SODIUM CHLORIDE 125 ML/HR: 9 INJECTION, SOLUTION INTRAVENOUS at 15:06

## 2022-05-31 RX ADMIN — ONDANSETRON 4 MG: 2 INJECTION INTRAMUSCULAR; INTRAVENOUS at 15:05

## 2022-05-31 RX ADMIN — PANTOPRAZOLE SODIUM 40 MG: 40 INJECTION, POWDER, FOR SOLUTION INTRAVENOUS at 15:06

## 2022-05-31 RX ADMIN — MORPHINE SULFATE 4 MG: 4 INJECTION INTRAVENOUS at 16:54

## 2022-05-31 NOTE — ED PROVIDER NOTES
EMERGENCY DEPARTMENT HISTORY AND PHYSICAL EXAM    Date: 5/31/2022  Patient Name: Figueroa Coy    History of Presenting Illness     Time Seen: 3:00 PM    Chief Complaint   Patient presents with    Vomiting       History Provided By: Patient and Patient's Daughter    Additional History (Context):   Figueroa Coy is a 77 y.o. female with a very complicated recent medical history presents emergency room with her daughter with complaints of severe headache. Global headache. Throbbing pounding headache. No blurry vision loss of vision. Patient attempted to take some additional aspirin for her headache but really did not help. Patient is currently on multiple blood thinners due to recent diagnosis of pulmonary embolism and having cardiac disease. No recent falls. Patient also has had daily vomiting. This has been an ongoing issue since she was discharged from the hospital.  Patient has been admitted to the hospital multiple times in the last month. Admitted to the hospital on May 3, 2022 for NSTEMI. Was hospitalized here for 2 days. Underwent cardiac angiogram.  Multiple stents placed. Under the care of Dr. Sachin Wood. Taking Brilinta and Eliquis. Also takes aspirin. Does not complain of any chest pain or shortness of breath along with vomiting. No vomiting blood but she does not really look at it. Unable to hold anything on her stomach. On May 22, 2022, patient was admitted to this hospital for multiple subsegmental pulmonary emboli. Admitted for 1 day. Evidently the pulmonary emboli were very small with no evidence of RV strain. She was continued on Eliquis and Brilinta. Current dose Eliquis 5 mg twice daily and Brilinta 90 mg twice daily. To patient daughter, patient has been sick ever since that particular discharge with her vomiting episodes. Complains of pain in the upper abdomen. No melanotic stools. Bright red blood per rectum. No burgundy stools.   No history of any lower GI bleeding in the past.    PCP: Lara Montesinos MD    Current Outpatient Medications   Medication Sig Dispense Refill    omeprazole (PRILOSEC) 20 mg capsule Take 1 Capsule by mouth daily. 30 Capsule 0    ondansetron (ZOFRAN ODT) 4 mg disintegrating tablet Take 1 Tablet by mouth every eight (8) hours as needed for Nausea or Vomiting. 15 Tablet 0    apixaban (ELIQUIS) 5 mg (74 tabs) starter pack Take 10 mg (two 5 mg tablets) by mouth twice a day for 7 days Followed by 5 mg (one 5 mg tablet) by mouth twice a day 1 Dose Pack 0    gabapentin (NEURONTIN) 300 mg capsule Take 1 Capsule by mouth two (2) times a day. Max Daily Amount: 600 mg. 60 Capsule 0    apixaban (Eliquis) 5 mg tablet Take 1 Tablet by mouth two (2) times a day. 60 Tablet 0    metoprolol tartrate (LOPRESSOR) 25 mg tablet Take 0.5 Tablets by mouth every twelve (12) hours. 60 Tablet 0    nitroglycerin (NITROSTAT) 0.4 mg SL tablet 1 Tablet by SubLINGual route every five (5) minutes as needed for Chest Pain. Up to 3 doses. 20 Each 0    levothyroxine (SYNTHROID) 25 mcg tablet 25 mcg.  polyethylene glycol (Miralax) 17 gram/dose powder Take 17 g by mouth daily. 1 tablespoon with 8 oz of water daily 300 g 0    aspirin delayed-release 81 mg tablet Take 1 Tab by mouth daily. 30 Tab 0    ticagrelor (BRILINTA) 90 mg tablet Take 1 Tab by mouth two (2) times a day. 60 Tab 0    metFORMIN (GLUCOPHAGE) 500 mg tablet Take 500 mg by mouth daily (with breakfast).  PARoxetine (PAXIL) 40 mg tablet Take 40 mg by mouth every evening.  cycloSPORINE (RESTASIS) 0.05 % ophthalmic emulsion Administer 1 Drop to both eyes every twelve (12) hours.  budesonide-formoterol (SYMBICORT) 80-4.5 mcg/actuation HFAA inhaler Take 2 Puffs by inhalation two (2) times a day.  albuterol (PROVENTIL HFA, VENTOLIN HFA, PROAIR HFA) 90 mcg/actuation inhaler Take 2 Puffs by inhalation every four (4) hours as needed for Wheezing.       vitamin E (AQUA GEMS) 400 unit capsule Take 400 Units by mouth daily.  vit a,c & e-lutein-minerals (OCUVITE) tablet 1 Tab daily.  potassium 99 mg tablet Take 99 mg by mouth daily. Past History     Past Medical History:  Past Medical History:   Diagnosis Date    Asthma     Diabetes ([address]     Hypertension     Hypertension, accelerated 4/13/2016    Positive cardiac stress test 4/14/2016    Precordial pain 4/13/2016    Psychiatric disorder     depression       Past Surgical History:  Past Surgical History:   Procedure Laterality Date    HX ORTHOPAEDIC      neck surgery    HX UROLOGICAL      bladder surgery       Family History:  History reviewed. No pertinent family history. Social History:  Social History     Tobacco Use    Smoking status: Never Smoker    Smokeless tobacco: Never Used   Vaping Use    Vaping Use: Never used   Substance Use Topics    Alcohol use: No    Drug use: Never       Allergies: Allergies   Allergen Reactions    Statins-Hmg-Coa Reductase Inhibitors Other (comments)         Review of Systems   Review of Systems   Constitutional: Positive for appetite change and fatigue. Negative for chills and fever. Respiratory: Negative for cough, chest tightness and shortness of breath. Cardiovascular: Negative for chest pain and palpitations. Gastrointestinal: Positive for abdominal pain, blood in stool, nausea and vomiting. Negative for constipation and diarrhea. Genitourinary: Negative for decreased urine volume, dysuria, flank pain, frequency, hematuria and urgency. Musculoskeletal: Negative for back pain. Skin: Negative for pallor. Neurological: Positive for headaches. Negative for dizziness, syncope and light-headedness. Hematological: Does not bruise/bleed easily. Physical Exam     Vitals:    05/31/22 1445   BP: 124/61   Pulse: 83   Resp: 15   Temp: 97.3 °F (36.3 °C)   SpO2: 98%   Weight: 91.6 kg (202 lb)     Physical Exam  Vitals and nursing note reviewed.    Constitutional:       General: She is not in acute distress. Appearance: Normal appearance. She is well-developed, well-groomed and overweight. She is not ill-appearing. Comments: XT 10year-old female no apparent distress. Vital signs are stable. Cooperative. HENT:      Mouth/Throat:      Mouth: Mucous membranes are moist.      Pharynx: Oropharynx is clear. Eyes:      Extraocular Movements: Extraocular movements intact. Conjunctiva/sclera: Conjunctivae normal.      Pupils: Pupils are equal, round, and reactive to light. Cardiovascular:      Rate and Rhythm: Normal rate and regular rhythm. Heart sounds: Normal heart sounds. Pulmonary:      Effort: Pulmonary effort is normal.      Breath sounds: Normal breath sounds and air entry. Abdominal:      General: Bowel sounds are normal.      Palpations: Abdomen is soft. There is no hepatomegaly or splenomegaly. Tenderness: There is abdominal tenderness in the right upper quadrant, right lower quadrant and epigastric area. There is no right CVA tenderness, left CVA tenderness, guarding or rebound. Hernia: No hernia is present. Comments: Minimally rounded abdomen  Tenderness extends from the epigastric region along the right upper quadrant and ultimately down towards her right lower quadrant. Musculoskeletal:         General: No swelling or tenderness. Cervical back: Neck supple. Right lower leg: Edema present. Left lower leg: No edema. Skin:     General: Skin is warm and dry. Capillary Refill: Capillary refill takes less than 2 seconds. Neurological:      Mental Status: She is alert and oriented to person, place, and time. Psychiatric:         Mood and Affect: Mood normal.         Behavior: Behavior normal. Behavior is cooperative. Nursing note and vitals reviewed         Diagnostic Study Results     Labs -     No results found for this or any previous visit (from the past 12 hour(s)).     Radiologic Studies   CT HEAD WO CONT   Final Result      No acute intracranial abnormality. CT Results  (Last 48 hours)               05/31/22 1600  CT HEAD WO CONT Final result    Impression:      No acute intracranial abnormality. Narrative:  EXAM: CT head       INDICATION: Headache. COMPARISON: None. TECHNIQUE: Axial CT imaging of the head was performed without intravenous   contrast. Standard multiplanar coronal and sagittal reformatted images were   obtained and are included in interpretation. One or more dose reduction techniques were used on this CT: automated exposure   control, adjustment of the mAs and/or kVp according to patient size, and   iterative reconstruction techniques. The specific techniques used on this CT   exam have been documented in the patient's electronic medical record. Digital   Imaging and Communications in Medicine (DICOM) format image data are available   to nonaffiliated external healthcare facilities or entities on a secure, media   free, reciprocally searchable basis with patient authorization for at least a   12-month period after this study. _______________       FINDINGS:       BRAIN AND POSTERIOR FOSSA: No evidence of acute large vessel transcortical   infarct or acute parenchymal hemorrhage. No midline shift or hydrocephalus. EXTRA-AXIAL SPACES AND MENINGES: There are no abnormal extra-axial fluid   collections. CALVARIUM: Intact. SINUSES: Clear. OTHER: None.       _______________               CXR Results  (Last 48 hours)    None            Medical Decision Making   I am the first provider for this patient. I reviewed the vital signs, available nursing notes, past medical history, past surgical history, family history and social history. Vital Signs-Reviewed the patient's vital signs.     Pulse Oximetry Analysis 98% on room air    Records Reviewed: Nursing Notes, Old Medical Records, Previous Radiology Studies and Previous Laboratory Studies    DDX:  Abdominal pain, nausea with vomiting -gastritis, duodenitis, peptic ulcer disease, gastroparesis  Bright red blood per rectum -hemorrhoids, diverticular, arteriovenous malformation, colitis  Blood thinners due to coronary artery disease, pulmonary embolism      Procedures:  Procedures    ED Course:   Initial assessment performed. The patients presenting problems have been discussed, and they are in agreement with the care plan formulated and outlined with them. I have encouraged them to ask questions as they arise throughout their visit. ED Physician Discussion Note:   Patient with significant medical issues over the last month. Vital signs are stable -this is good based on the fact that she has had so much illness this month. She was started on IV fluids normal saline at 125 cc/h. Protonix was ordered by IV. She was kept n.p.o.    CBC stable  Coags normal  Chemistry show mildly elevated glucose 152 otherwise normal  Liver function test negative  Lipase negative  Troponin 24  Occult stool positive  CT head negative  EKG negative    Patient was given Protonix and Zofran upon arrival due to abdominal complaints, vomiting. Low suspicion of upper GI bleed. Some fluids. Medication seemed to help. No complaints. Actually got hungry. Gave morphine for headache    Spoke to Dr. Jak Borrego. Patient has a schedule appointment for colonoscopy at the end of August, 2022. He was advised patient's last colonoscopy was done 10 years ago. No issues back then. Suspects bleeding from rectum is most likely hemorrhoidal. However, he does believe that she does need colonoscopy. No other recommendations at this time. Plan is to discharge patient home with PPI as well as medication for nausea. She could take her regular medication for headache at home. Again, recommended follow up with Gastroenterology for colonoscopy.     DC            Diagnosis and Disposition       DISCHARGE NOTE:  Raheem Bolivar results have been reviewed with her. She has been counseled regarding her diagnosis, treatment, and plan. She verbally conveys understanding and agreement of the signs, symptoms, diagnosis, treatment and prognosis and additionally agrees to follow up as discussed. She also agrees with the care-plan and conveys that all of her questions have been answered. I have also provided discharge instructions for her that include: educational information regarding their diagnosis and treatment, and list of reasons why they would want to return to the ED prior to their follow-up appointment, should her condition change. She has been provided with education for proper emergency department utilization. CLINICAL IMPRESSION:    1. Nausea and vomiting, unspecified vomiting type    2. Upper abdominal pain    3. BRBPR (bright red blood per rectum)    4. Coronary artery disease involving native heart without angina pectoris, unspecified vessel or lesion type    5. History of pulmonary embolism    6. Chronic anticoagulation        PLAN:  1. D/C Home  2. Discharge Medication List as of 5/31/2022  5:46 PM      START taking these medications    Details   omeprazole (PRILOSEC) 20 mg capsule Take 1 Capsule by mouth daily. , Normal, Disp-30 Capsule, R-0      ondansetron (ZOFRAN ODT) 4 mg disintegrating tablet Take 1 Tablet by mouth every eight (8) hours as needed for Nausea or Vomiting., Normal, Disp-15 Tablet, R-0         CONTINUE these medications which have NOT CHANGED    Details   apixaban (ELIQUIS) 5 mg (74 tabs) starter pack Take 10 mg (two 5 mg tablets) by mouth twice a day for 7 days Followed by 5 mg (one 5 mg tablet) by mouth twice a day, Print, Disp-1 Dose Pack, R-0      gabapentin (NEURONTIN) 300 mg capsule Take 1 Capsule by mouth two (2) times a day.  Max Daily Amount: 600 mg., Print, Disp-60 Capsule, R-0      apixaban (Eliquis) 5 mg tablet Take 1 Tablet by mouth two (2) times a day., Print, Disp-60 Tablet, R-0 metoprolol tartrate (LOPRESSOR) 25 mg tablet Take 0.5 Tablets by mouth every twelve (12) hours. , Normal, Disp-60 Tablet, R-0      nitroglycerin (NITROSTAT) 0.4 mg SL tablet 1 Tablet by SubLINGual route every five (5) minutes as needed for Chest Pain. Up to 3 doses. , Normal, Disp-20 Each, R-0      levothyroxine (SYNTHROID) 25 mcg tablet 25 mcg., Historical Med      polyethylene glycol (Miralax) 17 gram/dose powder Take 17 g by mouth daily. 1 tablespoon with 8 oz of water daily, Print, Disp-300 g, R-0      aspirin delayed-release 81 mg tablet Take 1 Tab by mouth daily. , Print, Disp-30 Tab, R-0      ticagrelor (BRILINTA) 90 mg tablet Take 1 Tab by mouth two (2) times a day., Print, Disp-60 Tab, R-0      metFORMIN (GLUCOPHAGE) 500 mg tablet Take 500 mg by mouth daily (with breakfast). , Historical Med      PARoxetine (PAXIL) 40 mg tablet Take 40 mg by mouth every evening., Historical Med      cycloSPORINE (RESTASIS) 0.05 % ophthalmic emulsion Administer 1 Drop to both eyes every twelve (12) hours. , Historical Med      budesonide-formoterol (SYMBICORT) 80-4.5 mcg/actuation HFAA inhaler Take 2 Puffs by inhalation two (2) times a day., Historical Med      albuterol (PROVENTIL HFA, VENTOLIN HFA, PROAIR HFA) 90 mcg/actuation inhaler Take 2 Puffs by inhalation every four (4) hours as needed for Wheezing., Historical Med      vitamin E (AQUA GEMS) 400 unit capsule Take 400 Units by mouth daily. , Historical Med      vit a,c & e-lutein-minerals (OCUVITE) tablet 1 Tab daily. , Historical Med      potassium 99 mg tablet Take 99 mg by mouth daily. , Historical Med         STOP taking these medications       ciprofloxacin HCl (Cipro) 250 mg tablet Comments:   Reason for Stopping:         cyclobenzaprine (FLEXERIL) 10 mg tablet Comments:   Reason for Stopping:             3.   Follow-up Information     Follow up With Specialties Details Why Contact Info    Julián Arenas MD Family Medicine Call  Call your primary care provider for follow-up care [address] [facility]  [phone]      Conchita Simmons MD Gastroenterology Go to  Follow-up with gastroenterology [address] Naveed Lora [address] [facility] [id number] [place] [address]      [facility] EMERGENCY DEPT Emergency Medicine  If symptoms worsen, As needed [address]   [place] [id number]  [phone]        ____________________________________     Please note that this dictation was completed with [employer], the computer voice recognition software. Quite often unanticipated grammatical, syntax, homophones, and other interpretive errors are inadvertently transcribed by the computer software. Please disregard these errors. Please excuse any errors that have escaped final proofreading.

## 2022-05-31 NOTE — DISCHARGE INSTRUCTIONS
Clear liquids. Try to keep well-hydrated. [place] diet. Advance as tolerated  Medications as prescribed  Keep all appointments with gastroenterology  Worse?   Return to ER immediately  Tylenol for headache

## 2022-05-31 NOTE — ED TRIAGE NOTES
Pt presents for 3 days of vomiting, melena, and HA. Recent NSTEMI with cath/stent placement by Caroline Marie and YUMIKO Roa on Brilinta and Eliquis.  Denies fever, abdominal pain

## 2022-06-01 LAB
ATRIAL RATE: 77 BPM
CALCULATED P AXIS, ECG09: 35 DEGREES
CALCULATED R AXIS, ECG10: 31 DEGREES
CALCULATED T AXIS, ECG11: 37 DEGREES
DIAGNOSIS, 93000: NORMAL
P-R INTERVAL, ECG05: 174 MS
Q-T INTERVAL, ECG07: 392 MS
QRS DURATION, ECG06: 80 MS
QTC CALCULATION (BEZET), ECG08: 443 MS
VENTRICULAR RATE, ECG03: 77 BPM

## 2022-06-05 ENCOUNTER — HOSPITAL ENCOUNTER (EMERGENCY)
Age: 67
Discharge: HOME | End: 2022-06-05
Attending: EMERGENCY MEDICINE | Admitting: EMERGENCY MEDICINE
Payer: MEDICARE

## 2022-06-05 VITALS
SYSTOLIC BLOOD PRESSURE: 134 MMHG | HEART RATE: 72 BPM | HEIGHT: 61 IN | RESPIRATION RATE: 20 BRPM | OXYGEN SATURATION: 97 % | DIASTOLIC BLOOD PRESSURE: 53 MMHG | WEIGHT: 195 LBS | BODY MASS INDEX: 36.82 KG/M2

## 2022-06-05 DIAGNOSIS — K13.79 BLEEDING FROM MOUTH: Primary | ICD-10-CM

## 2022-06-05 DIAGNOSIS — Z79.02 LONG TERM CURRENT USE OF ANTITHROMBOTICS/ANTIPLATELETS: ICD-10-CM

## 2022-06-05 DIAGNOSIS — Z79.01 ANTICOAGULATED: ICD-10-CM

## 2022-06-05 LAB
ABO + RH BLD: NORMAL
ALBUMIN SERPL-MCNC: 3.5 G/DL (ref 3.4–5)
ALBUMIN/GLOB SERPL: 0.9 {RATIO} (ref 0.8–1.7)
ALP SERPL-CCNC: 101 U/L (ref 45–117)
ALT SERPL-CCNC: 29 U/L (ref 13–56)
ANION GAP SERPL CALC-SCNC: 7 MMOL/L (ref 3–18)
APTT PPP: 29.5 SEC (ref 23–36.4)
AST SERPL-CCNC: 17 U/L (ref 10–38)
BASOPHILS # BLD: 0 K/UL (ref 0–0.1)
BASOPHILS NFR BLD: 1 % (ref 0–2)
BILIRUB SERPL-MCNC: 0.3 MG/DL (ref 0.2–1)
BLOOD GROUP ANTIBODIES SERPL: NORMAL
BUN SERPL-MCNC: 11 MG/DL (ref 7–18)
BUN/CREAT SERPL: 12 (ref 12–20)
CALCIUM SERPL-MCNC: 8.5 MG/DL (ref 8.5–10.1)
CHLORIDE SERPL-SCNC: 103 MMOL/L (ref 100–111)
CO2 SERPL-SCNC: 26 MMOL/L (ref 21–32)
CREAT SERPL-MCNC: 0.94 MG/DL (ref 0.6–1.3)
DIFFERENTIAL METHOD BLD: NORMAL
EOSINOPHIL # BLD: 0.1 K/UL (ref 0–0.4)
EOSINOPHIL NFR BLD: 3 % (ref 0–5)
ERYTHROCYTE [DISTWIDTH] IN BLOOD BY AUTOMATED COUNT: 13.4 % (ref 11.6–14.5)
GLOBULIN SER CALC-MCNC: 4 G/DL (ref 2–4)
GLUCOSE SERPL-MCNC: 284 MG/DL (ref 74–99)
HCT VFR BLD AUTO: 38.1 % (ref 35–45)
HGB BLD-MCNC: 12.4 G/DL (ref 12–16)
IMM GRANULOCYTES # BLD AUTO: 0 K/UL (ref 0–0.04)
IMM GRANULOCYTES NFR BLD AUTO: 0 % (ref 0–0.5)
INR PPP: 1 (ref 0.8–1.2)
LYMPHOCYTES # BLD: 1.4 K/UL (ref 0.9–3.6)
LYMPHOCYTES NFR BLD: 26 % (ref 21–52)
MCH RBC QN AUTO: 28.3 PG (ref 24–34)
MCHC RBC AUTO-ENTMCNC: 32.5 G/DL (ref 31–37)
MCV RBC AUTO: 87 FL (ref 78–100)
MONOCYTES # BLD: 0.4 K/UL (ref 0.05–1.2)
MONOCYTES NFR BLD: 8 % (ref 3–10)
NEUTS SEG # BLD: 3.5 K/UL (ref 1.8–8)
NEUTS SEG NFR BLD: 63 % (ref 40–73)
NRBC # BLD: 0 K/UL (ref 0–0.01)
NRBC BLD-RTO: 0 PER 100 WBC
PLATELET # BLD AUTO: 289 K/UL (ref 135–420)
PMV BLD AUTO: 9.4 FL (ref 9.2–11.8)
POTASSIUM SERPL-SCNC: 3.6 MMOL/L (ref 3.5–5.5)
PROT SERPL-MCNC: 7.5 G/DL (ref 6.4–8.2)
PROTHROMBIN TIME: 13.6 SEC (ref 11.5–15.2)
RBC # BLD AUTO: 4.38 M/UL (ref 4.2–5.3)
SODIUM SERPL-SCNC: 136 MMOL/L (ref 136–145)
SPECIMEN EXP DATE BLD: NORMAL
WBC # BLD AUTO: 5.5 K/UL (ref 4.6–13.2)

## 2022-06-05 PROCEDURE — 86900 BLOOD TYPING SEROLOGIC ABO: CPT

## 2022-06-05 PROCEDURE — 80053 COMPREHEN METABOLIC PANEL: CPT

## 2022-06-05 PROCEDURE — 85025 COMPLETE CBC W/AUTO DIFF WBC: CPT

## 2022-06-05 PROCEDURE — 99284 EMERGENCY DEPT VISIT MOD MDM: CPT

## 2022-06-05 PROCEDURE — 96374 THER/PROPH/DIAG INJ IV PUSH: CPT

## 2022-06-05 PROCEDURE — 85610 PROTHROMBIN TIME: CPT

## 2022-06-05 PROCEDURE — 74011250636 HC RX REV CODE- 250/636: Performed by: PHYSICIAN ASSISTANT

## 2022-06-05 PROCEDURE — 85730 THROMBOPLASTIN TIME PARTIAL: CPT

## 2022-06-05 PROCEDURE — 74011000250 HC RX REV CODE- 250: Performed by: PHYSICIAN ASSISTANT

## 2022-06-05 RX ORDER — ONDANSETRON 2 MG/ML
4 INJECTION INTRAMUSCULAR; INTRAVENOUS
Status: COMPLETED | OUTPATIENT
Start: 2022-06-05 | End: 2022-06-05

## 2022-06-05 RX ADMIN — WATER 10 ML: 1 INJECTION INTRAMUSCULAR; INTRAVENOUS; SUBCUTANEOUS at 16:49

## 2022-06-05 RX ADMIN — ONDANSETRON 4 MG: 2 INJECTION INTRAMUSCULAR; INTRAVENOUS at 16:50

## 2022-06-05 NOTE — ED TRIAGE NOTES
Ambulatory patient arrives with complaints of bleeding from mouth x3 hours after waking up from nap this afternoon, on blood thinners

## 2022-06-05 NOTE — ED PROVIDER NOTES
EMERGENCY DEPARTMENT HISTORY AND PHYSICAL EXAM    Date: 6/5/2022  Patient Name: Fortunato Reddy    History of Presenting Illness     Chief Complaint   Patient presents with    Other         History Provided By: Patient    Chief Complaint: bleeding in mouth    HPI(Context):   4:08 PM  Fortunato Reddy is a 77 y.o. female with PMHX of HTN, asthma, DM, CAD with stents, PE, who presents to the emergency department C/O blood in mouth. Pt took a nap this afternoon and awoke with bleeding in mouth. Pt is on Eliquis due to hx of PE and Brilinta due to hx of cardiac stents. Pt denies trauma or tough meals Pt denies fever, vomiting, and any other sxs or complaints. PCP: Fabiano Tee MD    Current Outpatient Medications   Medication Sig Dispense Refill    omeprazole (PRILOSEC) 20 mg capsule Take 1 Capsule by mouth daily. 30 Capsule 0    ondansetron (ZOFRAN ODT) 4 mg disintegrating tablet Take 1 Tablet by mouth every eight (8) hours as needed for Nausea or Vomiting. 15 Tablet 0    apixaban (ELIQUIS) 5 mg (74 tabs) starter pack Take 10 mg (two 5 mg tablets) by mouth twice a day for 7 days Followed by 5 mg (one 5 mg tablet) by mouth twice a day 1 Dose Pack 0    gabapentin (NEURONTIN) 300 mg capsule Take 1 Capsule by mouth two (2) times a day. Max Daily Amount: 600 mg. 60 Capsule 0    apixaban (Eliquis) 5 mg tablet Take 1 Tablet by mouth two (2) times a day. 60 Tablet 0    metoprolol tartrate (LOPRESSOR) 25 mg tablet Take 0.5 Tablets by mouth every twelve (12) hours. 60 Tablet 0    nitroglycerin (NITROSTAT) 0.4 mg SL tablet 1 Tablet by SubLINGual route every five (5) minutes as needed for Chest Pain. Up to 3 doses. 20 Each 0    levothyroxine (SYNTHROID) 25 mcg tablet 25 mcg.  polyethylene glycol (Miralax) 17 gram/dose powder Take 17 g by mouth daily. 1 tablespoon with 8 oz of water daily 300 g 0    aspirin delayed-release 81 mg tablet Take 1 Tab by mouth daily.  30 Tab 0    ticagrelor (BRILINTA) 90 mg tablet Take 1 Tab by mouth two (2) times a day. 60 Tab 0    metFORMIN (GLUCOPHAGE) 500 mg tablet Take 500 mg by mouth daily (with breakfast).  PARoxetine (PAXIL) 40 mg tablet Take 40 mg by mouth every evening.  cycloSPORINE (RESTASIS) 0.05 % ophthalmic emulsion Administer 1 Drop to both eyes every twelve (12) hours.  budesonide-formoterol (SYMBICORT) 80-4.5 mcg/actuation HFAA inhaler Take 2 Puffs by inhalation two (2) times a day.  albuterol (PROVENTIL HFA, VENTOLIN HFA, PROAIR HFA) 90 mcg/actuation inhaler Take 2 Puffs by inhalation every four (4) hours as needed for Wheezing.  vitamin E (AQUA GEMS) 400 unit capsule Take 400 Units by mouth daily.  vit a,c & e-lutein-minerals (OCUVITE) tablet 1 Tab daily.  potassium 99 mg tablet Take 99 mg by mouth daily. Past History     Past Medical History:  Past Medical History:   Diagnosis Date    Asthma     Diabetes ([facility] [address]     Hypertension     Hypertension, accelerated 4/13/2016    Positive cardiac stress test 4/14/2016    Precordial pain 4/13/2016    Psychiatric disorder     depression       Past Surgical History:  Past Surgical History:   Procedure Laterality Date    HX ORTHOPAEDIC      neck surgery    HX UROLOGICAL      bladder surgery       Family History:  History reviewed. No pertinent family history. Social History:  Social History     Tobacco Use    Smoking status: Never Smoker    Smokeless tobacco: Never Used   Vaping Use    Vaping Use: Never used   Substance Use Topics    Alcohol use: No    Drug use: Never       Allergies: Allergies   Allergen Reactions    Statins-Hmg-Coa Reductase Inhibitors Other (comments)         Review of Systems   Review of Systems   Constitutional: Negative for chills. HENT:        Bleeding in mouth     Respiratory: Negative for cough and shortness of breath. Cardiovascular: Negative for chest pain. Gastrointestinal: Negative for anal bleeding.    Neurological: Negative for dizziness and light-headedness. Hematological: Bruises/bleeds easily. All other systems reviewed and are negative. Physical Exam     Vitals:    06/05/22 1701 06/05/22 1717 06/05/22 1732 06/05/22 1747   BP:  (!) 116/53 123/68 (!) 134/53   Pulse:  76 73 72   Resp:  19 23 20   SpO2: 99% 96% 95% 97%   Weight:       Height:         Physical Exam  Vitals and nursing note reviewed. Constitutional:       General: She is not in acute distress. Appearance: She is well-developed. She is not diaphoretic. Comments:  female in NAD. Alert. Blood around mouth. Daughter at bedside   HENT:      Head: Normocephalic and atraumatic. Jaw: No trismus. Right Ear: External ear normal.      Left Ear: External ear normal.      Nose: Nose normal.      Mouth/Throat:      Mouth: No oral lesions. Dentition: No dental abscesses or gum lesions. Tongue: No lesions. Pharynx: Uvula midline. No oropharyngeal exudate, posterior oropharyngeal erythema or uvula swelling. Tonsils: No tonsillar abscesses. Eyes:      General: No scleral icterus. Right eye: No discharge. Left eye: No discharge. Conjunctiva/sclera: Conjunctivae normal.   Cardiovascular:      Rate and Rhythm: Normal rate and regular rhythm. Heart sounds: Normal heart sounds. No murmur heard. No friction rub. No gallop. Pulmonary:      Effort: Pulmonary effort is normal. No tachypnea, accessory muscle usage or respiratory distress. Breath sounds: Normal breath sounds. No decreased breath sounds, wheezing, rhonchi or rales. Musculoskeletal:         General: Normal range of motion. Cervical back: Normal range of motion. Skin:     General: Skin is warm and dry. Neurological:      Mental Status: She is alert and oriented to person, place, and time.    Psychiatric:         Judgment: Judgment normal.             Diagnostic Study Results     Labs -     Recent Results (from the past 12 hour(s))   CBC WITH AUTOMATED DIFF    Collection Time: 06/05/22  4:20 PM   Result Value Ref Range    WBC 5.5 4.6 - 13.2 K/uL    RBC 4.38 4.20 - 5.30 M/uL    HGB 12.4 12.0 - 16.0 g/dL    HCT 38.1 35.0 - 45.0 %    MCV 87.0 78.0 - 100.0 FL    MCH 28.3 24.0 - 34.0 PG    MCHC 32.5 31.0 - 37.0 g/dL    RDW 13.4 11.6 - 14.5 %    PLATELET 387 941 - 055 K/uL    MPV 9.4 9.2 - 11.8 FL    NRBC 0.0 0  WBC    ABSOLUTE NRBC 0.00 0.00 - 0.01 K/uL    NEUTROPHILS 63 40 - 73 %    LYMPHOCYTES 26 21 - 52 %    MONOCYTES 8 3 - 10 %    EOSINOPHILS 3 0 - 5 %    BASOPHILS 1 0 - 2 %    IMMATURE GRANULOCYTES 0 0.0 - 0.5 %    ABS. NEUTROPHILS 3.5 1.8 - 8.0 K/UL    ABS. LYMPHOCYTES 1.4 0.9 - 3.6 K/UL    ABS. MONOCYTES 0.4 0.05 - 1.2 K/UL    ABS. EOSINOPHILS 0.1 0.0 - 0.4 K/UL    ABS. BASOPHILS 0.0 0.0 - 0.1 K/UL    ABS. IMM. GRANS. 0.0 0.00 - 0.04 K/UL    DF AUTOMATED     METABOLIC PANEL, COMPREHENSIVE    Collection Time: 06/05/22  4:20 PM   Result Value Ref Range    Sodium 136 136 - 145 mmol/L    Potassium 3.6 3.5 - 5.5 mmol/L    Chloride 103 100 - 111 mmol/L    CO2 26 21 - 32 mmol/L    Anion gap 7 3.0 - 18 mmol/L    Glucose 284 (H) 74 - 99 mg/dL    BUN 11 7.0 - 18 MG/DL    Creatinine 0.94 0.6 - 1.3 MG/DL    BUN/Creatinine ratio 12 12 - 20      GFR est AA >60 >60 ml/min/1.73m2    GFR est non-AA 60 (L) >60 ml/min/1.73m2    Calcium 8.5 8.5 - 10.1 MG/DL    Bilirubin, total 0.3 0.2 - 1.0 MG/DL    ALT (SGPT) 29 13 - 56 U/L    AST (SGOT) 17 10 - 38 U/L    Alk.  phosphatase 101 45 - 117 U/L    Protein, total 7.5 6.4 - 8.2 g/dL    Albumin 3.5 3.4 - 5.0 g/dL    Globulin 4.0 2.0 - 4.0 g/dL    A-G Ratio 0.9 0.8 - 1.7     TYPE & SCREEN    Collection Time: 06/05/22  4:20 PM   Result Value Ref Range    Crossmatch Expiration 06/08/2022,2359     ABO/Rh(D) B POSITIVE     Antibody screen NEG    PROTHROMBIN TIME + INR    Collection Time: 06/05/22  4:20 PM   Result Value Ref Range    Prothrombin time 13.6 11.5 - 15.2 sec    INR 1.0 0.8 - 1.2     PTT    Collection Time: 06/05/22  4:20 PM Result Value Ref Range    aPTT 29.5 23.0 - 36.4 SEC         No orders to display     CT Results  (Last 48 hours)    None        CXR Results  (Last 48 hours)    None          Medications given in the ED-  Medications   ondansetron (ZOFRAN) injection 4 mg (4 mg IntraVENous Given 6/5/22 0470)   tranexamic acid (CYKLOKAPRON) 5% in sterile water oral solution (10 mL Swish and Spit Given by Provider 6/5/22 2010)         Medical Decision Making   I am the first provider for this patient. I reviewed the vital signs, available nursing notes, past medical history, past surgical history, family history and social history. Vital Signs-Reviewed the patient's vital signs. Pulse Oximetry Analysis - 97% on RA. NORMAL    Records Reviewed: Nursing Notes, Old Medical Records, Previous Radiology Studies and Previous Laboratory Studies    Provider Notes (Medical Decision Making): bleeding from right lateral aspect of base of tongue. No sublingual hematoma. Will check labs and appy dental sponges soaked in TXA. VSS    Procedures:  Procedures    ED Course:   4:08 PM Initial assessment performed. The patients presenting problems have been discussed, and they are in agreement with the care plan formulated and outlined with them. I have encouraged them to ask questions as they arise throughout their visit. Diagnosis and Disposition       Successful hemostasis after soaking dental gauze in TXA and direct applications. Pt observed for another one hour with no resumption of bleeding. Labs baseline. Discussed soft meals. Cold beverages. No hard brushing with toothbrush. Reasons to RTED discussed with pt. All questions answered. Pt feels comfortable going home at this time. Pt expressed understanding and she agrees with plan. 1. Bleeding from mouth    2. Long term current use of antithrombotics/antiplatelets    3. Anticoagulated        PLAN:  1. D/C Home  2. Discharge Medication List as of 6/5/2022  6:11 PM        3. Follow-up Information     Follow up With Specialties Details [facility] [place], [place], [address] [facility] [address] [phone]      [facility] EMERGENCY DEPT Emergency Medicine   [address] [facility]  [phone]        _______________________________    Attestations: This note is prepared by Alida Weaver PA-C.  _______________________________          Please note that this dictation was completed with Est[employer], the computer voice recognition software. Quite often unanticipated grammatical, syntax, homophones, and other interpretive errors are inadvertently transcribed by the computer software. Please disregard these errors. Please excuse any errors that have escaped final proofreading.

## 2022-08-29 RX ORDER — DIPHENHYDRAMINE HYDROCHLORIDE 50 MG/ML
50 INJECTION, SOLUTION INTRAMUSCULAR; INTRAVENOUS ONCE
Status: CANCELLED | OUTPATIENT
Start: 2022-08-29 | End: 2022-08-29

## 2022-08-29 RX ORDER — ATROPINE SULFATE 0.1 MG/ML
0.5 INJECTION INTRAVENOUS
Status: CANCELLED | OUTPATIENT
Start: 2022-08-29 | End: 2022-08-30

## 2022-08-29 RX ORDER — EPINEPHRINE 0.1 MG/ML
1 INJECTION INTRACARDIAC; INTRAVENOUS
Status: CANCELLED | OUTPATIENT
Start: 2022-08-29 | End: 2022-08-30

## 2022-08-29 RX ORDER — DEXTROMETHORPHAN/PSEUDOEPHED 2.5-7.5/.8
1.2 DROPS ORAL
Status: CANCELLED | OUTPATIENT
Start: 2022-08-29

## 2022-08-29 NOTE — H&P
Assessment/Plan  # Detail Type Description    1. Assessment Encounter for screening for cancer of colon (Z12.11). Impression Pt of Dr. Hillary Hernandez, here overdue for 10yr Recall, for screening colonoscopy.   _________________________________  *Last colonoscopy (1st exam) was done on 8/24/2009 by Dr. Marlin Mary @[facility]: Internal hemorrhoids noted. Normal entire colon. Normal mucosa. 10yr Recall recommended.   _________________________________  Average risk, no FHx of colon cancer. Asymptomatic of GI complaints. BM: 3/day. *PM/SH: HTN, HLD, Asthma, Multinodular Goiter, DM2, RLS, CAD (See below), STEVE (No Treatment: D/C'd from  Lung & Sleep), Monopolar Depression. S/p Cystocele repair w/mesh (2010), s/p Breast biopsy (2014). No reported hx of abdominal surgeries, strokes, or seizure disorders. Patient Plan *C-scope Plan:  Second Colonoscopy ordered with Dr. Marlin Mary with Miralax bowel prep, and Mag Citrate 2 days before prep, and Miralax and stool softeners starting 3 days before prep.  -Patient is advised that they should take their aspirin (if prescribed) up until the day of procedure.  -Patient is advised to take Thyroid meds, BP meds, beta blockers, and any cardiac meds the AM of procedure with sip clear liquid after prep. -Diabetic medication instructions (See Below). -Bring inhalers to procedure. *C-scope Risks:  Stressed importance of following all bowel preparation instructions. Explained the procedure to the patient including all risks and benefits. These risks consist of missed lesions on exam, bleeding, and bowel perforation with possible need for admission to the hospital, and in the most extensive of  circumstances, the patient may require surgery. Pt verbalized understanding of these risks and is agreeable with this procedure. Plan Orders Further diagnostic evaluations ordered today include(s) DIAGNOSTIC COLONOSCOPY to be performed.          2. Assessment Coronary artery disease of native coronary artery w/ other form of angina pectoris (I25.118). Impression Hx of CAD, CP, HTN and HLD - s/p Cardiac Stent placement (2016). No longer taking Brilinta for anticoagulation. Followed by Dr. Emmanuel Beltran (Cardiology) for this, last seen on 7/23/2019, no f/u interval specified. Nuclear Stress Test 2019, Negative with EF of 77%. 3. Assessment Type 2 diabetes mellitus w/ hyperglycemia (E11.65). Impression Diabetic pt, on oral medication: Metformin. Patient Plan -Patient is advised not to take pills for diabetes the day ahead of the procedure. Patient is advised (if insulin dependent) to take half of the usual long acting insulin the day before procedure (or as advised by the prescribing provider), and to follow accuchecks closely, 'rescuing' for a blood sugar below 90         4. Assessment Body mass index [BMI] 36.0-36.9, adult (Z68.36). Impression BMI: 36.8, Pt is Obese w/a Very Short Frame, and untreated STEVE. These factors increase risks of procedural complications with sedation, and compromise airway maintenance. Special attention to airway management. This 77year old  patient was referred by Olimpia Brown. This 77year old female presents for Colon Cancer Screening. History of Present Illness  1. Colon Cancer Screening   Prior screening:  colonoscopy. Denies risk factors. Pertinent negatives include abdominal pain, change in bowel habits, change in stool caliber, constipation, decreased appetite, diarrhea, melena, nausea, rectal bleeding, vomiting, weight gain and weight loss. Additional information: No family history of colon cancer, Last colonoscopy was 8/24/09 and BM 3x daily.           Problem List  Problem Description Onset Date Chronic Clinical Status Notes   Lateral epicondylitis of left elbow  N     Restless legs syndrome (RLS)  N     Screening for malignant neoplasm of colon done 04/26/2022 N     Body mass index 30+ - obesity 04/26/2022 N     Severe hyperglycemia due to diabetes mellitus 2022 N     Angina co-occurrent and due to coronary arteriosclerosis 2022 N       Past Medical/Surgical History   (Detailed)  Disease/disorder Onset Date Management Date Comments   RLS       Diabetes       Thyroid disease       High cholesterol       Asthma       Sleep apnea       Hypertension             Family History   (Detailed)    Relationship Family Member Name  Age at Death Condition Onset Age Cause of Death       Family history of Family history of kidney disease  N       No family history of Sleep apnea  N   Sister    cancer  N   Sister    High Cholesterol  N     Social History  (Detailed)  Tobacco use reviewed. Preferred language is [place]. Marital Status/Family/Social Support  Marital status:      Tobacco use status: Never smoked tobacco.    Smoking status: Never smoker. Tobacco Screening  Patient has never used tobacco. Patient has not used tobacco in the last 30 days. Patient has not used smokeless tobacco in the last 30 days. Smoking Status  Type Smoking Status Usage Per Day Years Used Pack Years Total Pack Years    Never smoker         Alcohol  There is no history of alcohol use. Caffeine  The patient uses caffeine: coffee - 1 cup a day.       Medications (active prior to today)  Medication Instructions Start Date Stop Date Refilled Elsewhere   One Touch Ultra System Kit .00 test 1x daily 2013   N   vitamin E 400 unit capsule  2015   N   lancets use as directed; 1-2 times a day 10/12/2015  10/12/2015 N   ONETOUCH ULTRA TEST STRIPS TEST one to two times a day 10/15/2015  10/15/2015 N   aspirin 81 mg tablet,delayed release take 1 tablet by oral route  every day //   Y   Repatha SureClick 977 mg/mL subcutaneous pen injector inject 1 milliliter by subcutaneous route  every 2 weeks in the abdomen, thigh, or outer area of upper arm (rotate sites) 2019   N   gabapentin 300 mg capsule take 1 - 2  capsules by oral route nightly  at bedtime 11/08/2019 11/08/2019 N   clonazepam 0.5 mg tablet take 1 tablet by oral route nightly at bedtime 12/01/2020 11/12/2025 12/01/2020 N   Zetia 10 mg tablet take 1 tablet by oral route  every day 01/06/2021   N   LISINOPRIL-HCTZ 20-25 MG TAB take 1 tablet by mouth once daily 02/16/2021 02/16/2021 N   hydroxyzine HCl 25 mg tablet take 1 - 2 tablet by oral route at bedtime 04/05/2021 04/05/2021 N   ProAir HFA 90 mcg/actuation aerosol inhaler inhale 2 puff by inhalation route  every 4 - 6 hours as needed 09/22/2021 09/22/2021 N   Singulair 10 mg tablet take 1 tablet by oral route  every day in the evening 10/04/2021   N   Trelegy Ellipta 100 mcg-62.5 mcg-25 mcg powder for inhalation inhale 1 puff by inhalation route  every day at the same time each day 12/16/2021 12/16/2021 N   FUROSEMIDE 20 MG TABLET take 1 tablet by mouth twice a day if needed for FLIUD RETENTION 12/29/2021 12/29/2021 N   levothyroxine 25 mcg tablet take 1 tablet by oral route  every day 01/21/2022 01/21/2022 N   METFORMIN  MG TABLET take 1 tablet by mouth twice a day with food 02/02/2022 02/02/2022 N   PAROXETINE HCL 40 MG TABLET take 1 tablet by mouth once daily 03/07/2022 03/07/2022 N   ROPINIROLE HCL 5 MG TABLET take 1 tablet by mouth twice a day 04/11/2022 04/11/2022 N     Patient Status   Completed with information received for patient in a summary of care record. Medication Reconciliation  Medications reconciled today.     Medication Reviewed  Adherence Medication Name Sig Desc Elsewhere Status   taking as directed One Touch Ultra System Kit .00 test 1x daily N Verified   taking as directed vitamin E 400 unit capsule  N Verified   taking as directed lancets use as directed; 1-2 times a day N Verified   taking as directed ONETOUCH ULTRA TEST STRIPS TEST one to two times a day N Verified   taking as directed aspirin 81 mg tablet,delayed release take 1 tablet by oral route  every day Y Verified taking as directed Glenn Mtz 413 mg/mL subcutaneous pen injector inject 1 milliliter by subcutaneous route  every 2 weeks in the abdomen, thigh, or outer area of upper arm (rotate sites) N Verified   taking as directed gabapentin 300 mg capsule take 1 - 2  capsules by oral route nightly  at bedtime N Verified   taking as directed clonazepam 0.5 mg tablet take 1 tablet by oral route nightly at bedtime N Verified   taking as directed Zetia 10 mg tablet take 1 tablet by oral route  every day N Verified   taking as directed LISINOPRIL-HCTZ 20-25 MG TAB take 1 tablet by mouth once daily N Verified   taking as directed hydroxyzine HCl 25 mg tablet take 1 - 2 tablet by oral route at bedtime N Verified   taking as directed ProAir HFA 90 mcg/actuation aerosol inhaler inhale 2 puff by inhalation route  every 4 - 6 hours as needed N Verified   taking as directed Singulair 10 mg tablet take 1 tablet by oral route  every day in the evening N Verified   taking as directed Trelegy Ellipta 100 mcg-62.5 mcg-25 mcg powder for inhalation inhale 1 puff by inhalation route  every day at the same time each day N Verified   taking as directed FUROSEMIDE 20 MG TABLET take 1 tablet by mouth twice a day if needed for FLIUD RETENTION N Verified   taking as directed levothyroxine 25 mcg tablet take 1 tablet by oral route  every day N Verified   taking as directed METFORMIN  MG TABLET take 1 tablet by mouth twice a day with food N Verified   taking as directed PAROXETINE HCL 40 MG TABLET take 1 tablet by mouth once daily N Verified   taking as directed ROPINIROLE HCL 5 MG TABLET take 1 tablet by mouth twice a day N Verified     Medications (Added, Continued or Stopped today)  Start Date Medication Directions PRN Status PRN Reason Instruction Stop Date    aspirin 81 mg tablet,delayed release take 1 tablet by oral route  every day N      12/01/2020 clonazepam 0.5 mg tablet take 1 tablet by oral route nightly at bedtime N 11/12/2025 12/29/2021 FUROSEMIDE 20 MG TABLET take 1 tablet by mouth twice a day if needed for FLIUD RETENTION N      11/08/2019 gabapentin 300 mg capsule take 1 - 2  capsules by oral route nightly  at bedtime N      04/05/2021 hydroxyzine HCl 25 mg tablet take 1 - 2 tablet by oral route at bedtime N      10/12/2015 lancets use as directed; 1-2 times a day N  TRUETRACK TEST STRIP    01/21/2022 levothyroxine 25 mcg tablet take 1 tablet by oral route  every day N      02/16/2021 LISINOPRIL-HCTZ 20-25 MG TAB take 1 tablet by mouth once daily N      02/02/2022 METFORMIN  MG TABLET take 1 tablet by mouth twice a day with food N      05/30/2013 One Touch Ultra System Kit .00 test 1x daily N      10/15/2015 ONETOUCH ULTRA TEST STRIPS TEST one to two times a day N      03/07/2022 PAROXETINE HCL 40 MG TABLET take 1 tablet by mouth once daily N      09/22/2021 ProAir HFA 90 mcg/actuation aerosol inhaler inhale 2 puff by inhalation route  every 4 - 6 hours as needed N      84/49/3205 Repatha SureClick 331 mg/mL subcutaneous pen injector inject 1 milliliter by subcutaneous route  every 2 weeks in the abdomen, thigh, or outer area of upper arm (rotate sites) N      04/11/2022 ROPINIROLE HCL 5 MG TABLET take 1 tablet by mouth twice a day N      10/04/2021 Singulair 10 mg tablet take 1 tablet by oral route  every day in the evening N      12/16/2021 Trelegy Ellipta 100 mcg-62.5 mcg-25 mcg powder for inhalation inhale 1 puff by inhalation route  every day at the same time each day N      05/13/2015 vitamin E 400 unit capsule  N      01/06/2021 Zetia 10 mg tablet take 1 tablet by oral route  every day N        Allergies  Ingredient Reaction (Severity) Medication Name Comment   OXYCODONE HCL hallucinations and nightmares  OxyContin   PRAVASTATIN SODIUM myalgias, fatigue  Pravachol     Reviewed, no changes. Review of Systems  System Neg/Pos Details   Constitutional Negative Fever, Weight gain and Weight loss. ENMT Negative Sinus Infection. Eyes Negative Double vision. Respiratory Negative Asthma, Chronic cough and Dyspnea. Cardio Negative Chest pain, Edema and Irregular heartbeat/palpitations. GI Negative Abdominal pain, Change in bowel habits, Change in stool caliber, Constipation, Decreased appetite, Diarrhea, Dysphagia, Heartburn, Hematemesis, Hematochezia, Melena, Nausea, Rectal bleeding, Reflux and Vomiting.  Negative Dysuria and Hematuria. Endocrine Negative Cold intolerance and Heat intolerance. Neuro Negative Dizziness, Headache, Numbness and Tremors. Psych Negative Anxiety, Depression and Increased stress. Integumentary Negative Hives, Pruritus and Rash. MS Negative Back pain, Joint pain and Myalgia. Hema/Lymph Negative Easy bleeding, Easy bruising and Lymphadenopathy. Allergic/Immuno Negative Food allergies and Immunosuppression. Vital Signs   Height  Time ft in cm Last Measured Height Position   12:58 PM 5.0 0.50 153.67 04/26/2022 0     Weight/BSA/BMI  Time lb oz kg Context BMI kg/m2 BSA m2   12:58 .00  87.090 dressed with shoes 36.88 1.93     Date/Time Temp Pulse BP Cardiac Rhythm Who   08/30/22 1012 98.2 °F (36.8 °C) 76 151/69 Abnormal  -- CS     Respiratory Therapy (last day)    Date/Time Resp SpO2 O2 Device O2 Flow Rate (L/min) Who   08/30/22 1012 16 99 % None (Room air) -- CS     Restore  Close  Cancel Previous  Next     Physical  Exam  Exam Findings Details   Female GI Quick Visit Comments Very Short Frame. Constitutional Normal Well developed. Eyes Normal Conjunctiva - Right: Normal, Left: Normal. Sclera - Right: Normal, Left: Normal.   Nasopharynx Normal Lips/teeth/gums - Normal.   Neck Exam Normal Inspection - Normal.   Respiratory Normal Inspection - Normal.   Cardiovascular Normal Regular rate and rhythm. No murmurs, gallops, or rubs. Vascular Normal Pulses - Brachial: Normal.   Abdomen * Obese.    Skin Normal Inspection - Normal.   Musculoskeletal Normal Hands/Wrist - Right: Normal, Left: Normal.   Extremity Normal No edema. Neurological Normal Fine motor skills - Normal.   Psychiatric Normal Orientation - Oriented to time, place, person & situation. Appropriate mood and affect. Patient Education  # Patient Education   1.  Colon Cancer Screening: Care Instructions         Active Patient Care Team Members  Name Contact Agency Type Support Role Relationship Active Date Inactive Date Specialty   rJ Taylor   encounter provider    Pulmonary Medicine   Celester Nephew   Patient provider PCP   [facility]   [phone] [facility]   encounter provider    Gastroenterology   Alia Freed    Spouse        No change in H&P

## 2022-08-30 ENCOUNTER — HOSPITAL ENCOUNTER (OUTPATIENT)
Age: 67
Setting detail: OUTPATIENT SURGERY
Discharge: HOME | End: 2022-08-30
Attending: INTERNAL MEDICINE | Admitting: INTERNAL MEDICINE
Payer: MEDICARE

## 2022-08-30 VITALS
RESPIRATION RATE: 16 BRPM | HEART RATE: 75 BPM | DIASTOLIC BLOOD PRESSURE: 56 MMHG | BODY MASS INDEX: 35.53 KG/M2 | HEIGHT: 61 IN | OXYGEN SATURATION: 95 % | TEMPERATURE: 98.2 F | WEIGHT: 188.2 LBS | SYSTOLIC BLOOD PRESSURE: 124 MMHG

## 2022-08-30 LAB — GLUCOSE BLD STRIP.AUTO-MCNC: 170 MG/DL (ref 70–110)

## 2022-08-30 PROCEDURE — 76040000007: Performed by: INTERNAL MEDICINE

## 2022-08-30 PROCEDURE — 99153 MOD SED SAME PHYS/QHP EA: CPT | Performed by: INTERNAL MEDICINE

## 2022-08-30 PROCEDURE — 74011250636 HC RX REV CODE- 250/636: Performed by: INTERNAL MEDICINE

## 2022-08-30 PROCEDURE — 77030013991 HC SNR POLYP ENDOSC BSC -A: Performed by: INTERNAL MEDICINE

## 2022-08-30 PROCEDURE — 2709999900 HC NON-CHARGEABLE SUPPLY: Performed by: INTERNAL MEDICINE

## 2022-08-30 PROCEDURE — G0500 MOD SEDAT ENDO SERVICE >5YRS: HCPCS | Performed by: INTERNAL MEDICINE

## 2022-08-30 PROCEDURE — 88305 TISSUE EXAM BY PATHOLOGIST: CPT

## 2022-08-30 PROCEDURE — 77030040361 HC SLV COMPR DVT MDII -B: Performed by: INTERNAL MEDICINE

## 2022-08-30 PROCEDURE — 82962 GLUCOSE BLOOD TEST: CPT

## 2022-08-30 RX ORDER — CLONAZEPAM 0.5 MG/1
TABLET ORAL
COMMUNITY

## 2022-08-30 RX ORDER — PANTOPRAZOLE SODIUM 40 MG/1
40 TABLET, DELAYED RELEASE ORAL DAILY
COMMUNITY

## 2022-08-30 RX ORDER — SODIUM CHLORIDE 0.9 % (FLUSH) 0.9 %
5-40 SYRINGE (ML) INJECTION EVERY 8 HOURS
Status: DISCONTINUED | OUTPATIENT
Start: 2022-08-30 | End: 2022-08-30 | Stop reason: HOSPADM

## 2022-08-30 RX ORDER — FUROSEMIDE 20 MG/1
TABLET ORAL DAILY
COMMUNITY

## 2022-08-30 RX ORDER — SODIUM CHLORIDE 0.9 % (FLUSH) 0.9 %
5-40 SYRINGE (ML) INJECTION AS NEEDED
Status: DISCONTINUED | OUTPATIENT
Start: 2022-08-30 | End: 2022-08-30 | Stop reason: HOSPADM

## 2022-08-30 RX ORDER — MIDAZOLAM HYDROCHLORIDE 1 MG/ML
.25-5 INJECTION, SOLUTION INTRAMUSCULAR; INTRAVENOUS
Status: DISCONTINUED | OUTPATIENT
Start: 2022-08-30 | End: 2022-08-30 | Stop reason: HOSPADM

## 2022-08-30 RX ORDER — NALOXONE HYDROCHLORIDE 0.4 MG/ML
0.4 INJECTION, SOLUTION INTRAMUSCULAR; INTRAVENOUS; SUBCUTANEOUS
Status: DISCONTINUED | OUTPATIENT
Start: 2022-08-30 | End: 2022-08-30 | Stop reason: HOSPADM

## 2022-08-30 RX ORDER — SODIUM CHLORIDE 9 MG/ML
1000 INJECTION, SOLUTION INTRAVENOUS CONTINUOUS
Status: DISCONTINUED | OUTPATIENT
Start: 2022-08-30 | End: 2022-08-30 | Stop reason: HOSPADM

## 2022-08-30 RX ORDER — FENTANYL CITRATE 50 UG/ML
100 INJECTION, SOLUTION INTRAMUSCULAR; INTRAVENOUS
Status: DISCONTINUED | OUTPATIENT
Start: 2022-08-30 | End: 2022-08-30 | Stop reason: HOSPADM

## 2022-08-30 RX ORDER — EVOLOCUMAB 140 MG/ML
INJECTION, SOLUTION SUBCUTANEOUS
COMMUNITY

## 2022-08-30 RX ORDER — ROPINIROLE 5 MG/1
TABLET, FILM COATED ORAL
COMMUNITY

## 2022-08-30 RX ORDER — FLUMAZENIL 0.1 MG/ML
0.2 INJECTION INTRAVENOUS
Status: DISCONTINUED | OUTPATIENT
Start: 2022-08-30 | End: 2022-08-30 | Stop reason: HOSPADM

## 2022-08-30 RX ORDER — CLOPIDOGREL BISULFATE 75 MG/1
TABLET ORAL
COMMUNITY

## 2022-08-30 RX ADMIN — SODIUM CHLORIDE 1000 ML: 9 INJECTION, SOLUTION INTRAVENOUS at 10:32

## 2022-08-30 NOTE — PROCEDURES
[facility]  Colonoscopy Procedure Report  _______________________________________________________  Patient: Alondra Schofield                                        Attending Physician: Janette Ford MD    Patient ID: [id number]                                    Referring Physician: Dionne Richardson MD    Exam Date: 8/30/2022      Introduction: A  77 y.o. female patient, presents for inpatient Colonoscopy    Indications: Screening for colon cancer average risk and asymptomatic. Pt of Dr. Vikki Barrientos, here overdue for 10yr Recall, for screening colonoscopy. Last colonoscopy (1st exam) was done on 8/24/2009 by Dr. Maame Ji @[facility]: Internal hemorrhoids noted. Normal entire colon. Normal mucosa. 10yr Recall recommended. No FHx of colon cancer. Asymptomatic of GI complaints. BM: 3/day. PM/SH: HTN, HLD, Asthma, Multinodular Goiter, DM2, RLS, CAD (See below), STEVE (No Treatment: D/C'd from  Lung & Sleep), Monopolar Depression. S/p Cystocele repair w/mesh (2010), s/p Breast biopsy (2014). Consent: The benefits, risks, and alternatives to the procedure were discussed and informed consent was obtained from the patient. Preparation: EKG, pulse, pulse oximetry and blood pressure were monitored throughout the procedure. ASA Classification: Class III- . The heart is an S1-S2 and regular heart rate and rhythm. Lungs are clear to auscultation and percussion. Abdomen is soft, nondistended, and nontender. Mental Status: awake, alert, and oriented to person, place, and time    Medications:  Fentanyl 100 mcg IV before procedure. Versed 3 mg IV throughout the procedure. Rectal Exam: Normal Rectal Exam. No Blood. Pathology Specimens:  1    Procedure: The colonoscope was passed with ease through the anus under direct visualization and advanced to the cecum and 5 cm inside the terminal ileum. The scope was withdrawn and the mucosa was carefully examined. The quality of the preparation was very good.  The views were excellent. The patient's toleration of the procedure was excellent. The exam was done twice to the cecum. Total time is 19 minutes and withdrawal time is 16 minutes. Findings:    Rectum:   Small internal hemorrhoids. Sigmoid:   normal  Descending Colon:   Normal   Transverse Colon:   5 mm sessile polyp in the proximal transverse colon hot snared with extra cautery because of the Plavix. Ascending Colon:   Normal  Cecum:   Normal  Terminal Ileum:   Normal.     Unplanned Events: There were no unplanned events. Estimated Blood Loss: None  IMPLANTS: * No implants in log *  Impressions: Small internal hemorrhoids. Slightly tortuous sigmoid colon. 5 mm sessile polyp in the proximal transverse colon hot snared with extra cautery. Normal Mucosa. No blood, diverticula or AVM found. Complications: None; patient tolerated the procedure well. Recommendations:  Discharge home when standard parameters are met. Resume a high fiber diet. Resume own medications. Avoid all NSAID's. Resume the Eliquis in 2 days and Plavix in 3 days. Colonoscopy recommendation in 10 years.   Take Miralax and/ or Colace 100 mg on regular basis if constipated    Procedure Codes:    Vallery Nyhan [[id number]]    Endoscope Information:  Model Number(s)    [id number]   Assistant: None  Signed By: Disha Robin MD Date: 8/30/2022

## 2022-08-30 NOTE — PERIOP NOTES
Patient arrived to post-op with heart monitor still in tact in the left mediastinal region. No complications  noted.  Dr Jos Marie pt to hold  elliquis and plavix for 2 days post procedure and to resume it on Thurs Sept 1, 2022

## 2022-08-30 NOTE — DISCHARGE INSTRUCTIONS
Kavin Kessler  [id number]  1955    COLON DISCHARGE INSTRUCTIONS    Discomfort:  Redness at IV site- apply warm compress to area; if redness or soreness persist- contact your physician  There may be a slight amount of blood passed from the rectum  Gaseous discomfort- walking, belching will help relieve any discomfort  You may not operate a vehicle til the next day. You may not engage in an occupation involving machinery or appliances til the next day. You may not drink alcoholic beverages til the next day. DIET:   High fiber diet. ACTIVITY:  You may not  resume your normal daily activities til the next day. it is recommended that you spend the remainder of the day resting -  avoid any strenuous activity. CALL M.D.  IF ANY SIGN OF:   Increasing pain, nausea, vomiting  Abdominal distension (swelling)  New increased bleeding (oral or rectal)  Fever (chills)  Pain in chest area  Bloody discharge from nose or mouth  Shortness of breath    You may not  take any Advil, Aspirin, Ibuprofen, Motrin, Aleve, or Goodys ONLY  Tylenol as needed for pain. Resume Eliquis in 2 days and Plavix in 3 days    Post procedure diagnosis:  POLYP;    Follow-up Instructions: Your follow up colonoscopy will be in 10 years. We will notify you the results of your biopsy through the portal within 2 weeks. Kevin Lam MD  August 30, 2022   Patient armband removed and shredded    DISCHARGE SUMMARY from Nurse    PATIENT INSTRUCTIONS:    After general anesthesia or intravenous sedation, for 24 hours or while taking prescription Narcotics:  Limit your activities  Do not drive and operate hazardous machinery  Do not make important personal or business decisions  Do  not drink alcoholic beverages  If you have not urinated within 8 hours after discharge, please contact your surgeon on call.     Report the following to your surgeon:  Excessive pain, swelling, redness or odor of or around the surgical area  Temperature over 100.5  Nausea and vomiting lasting longer than 4 hours or if unable to take medications  Any signs of decreased circulation or nerve impairment to extremity: change in color, persistent  numbness, tingling, coldness or increase pain  Any questions    What to do at Home:  Recommended activity: Activity as tolerated, as above    If you experience any of the following symptoms as above, please follow up with Dr. Anna Bradford. *  Please give a list of your current medications to your Primary Care Provider. *  Please update this list whenever your medications are discontinued, doses are      changed, or new medications (including over-the-counter products) are added. *  Please carry medication information at all times in case of emergency situations. These are general instructions for a healthy lifestyle:    No smoking/ No tobacco products/ Avoid exposure to second hand smoke  Surgeon General's Warning:  Quitting smoking now greatly reduces serious risk to your health. Obesity, smoking, and sedentary lifestyle greatly increases your risk for illness    A healthy diet, regular physical exercise & weight monitoring are important for maintaining a healthy lifestyle    You may be retaining fluid if you have a history of heart failure or if you experience any of the following symptoms:  Weight gain of 3 pounds or more overnight or 5 pounds in a week, increased swelling in our hands or feet or shortness of breath while lying flat in bed. Please call your doctor as soon as you notice any of these symptoms; do not wait until your next office visit. The discharge information has been reviewed with the patient and daughter. The patient and daughter verbalized understanding.   Discharge medications reviewed with the patient and appropriate educational materials and side effects teaching were provided.   ___________________________________________________________________________________________________________________________________

## 2024-06-07 ENCOUNTER — HOSPITAL ENCOUNTER (OUTPATIENT)
Facility: HOSPITAL | Age: 69
End: 2024-06-07
Payer: MEDICARE

## 2024-06-07 ENCOUNTER — TRANSCRIBE ORDERS (OUTPATIENT)
Facility: HOSPITAL | Age: 69
End: 2024-06-07

## 2024-06-07 DIAGNOSIS — R41.3 AMNESIA: ICD-10-CM

## 2024-06-07 DIAGNOSIS — R41.82 ALTERED MENTAL STATUS, UNSPECIFIED ALTERED MENTAL STATUS TYPE: ICD-10-CM

## 2024-06-07 DIAGNOSIS — R41.3 AMNESIA: Primary | ICD-10-CM

## 2024-06-07 LAB
TSH SERPL DL<=0.05 MIU/L-ACNC: 1.62 UIU/ML (ref 0.36–3.74)
VIT B12 SERPL-MCNC: 450 PG/ML (ref 211–911)

## 2024-06-07 PROCEDURE — 36415 COLL VENOUS BLD VENIPUNCTURE: CPT

## 2024-06-07 PROCEDURE — 84443 ASSAY THYROID STIM HORMONE: CPT

## 2024-06-07 PROCEDURE — 82607 VITAMIN B-12: CPT

## 2024-12-16 ENCOUNTER — HOSPITAL ENCOUNTER (OUTPATIENT)
Facility: HOSPITAL | Age: 69
Discharge: HOME | End: 2024-12-19
Attending: PSYCHIATRY & NEUROLOGY
Payer: MEDICARE

## 2024-12-16 DIAGNOSIS — R41.3 MEMORY LOSS: ICD-10-CM

## 2024-12-16 PROCEDURE — 70551 MRI BRAIN STEM W/O DYE: CPT

## (undated) DEVICE — NDL PRT INJ NSAF BLNT 18GX1.5 --

## (undated) DEVICE — CATH ANGI BLLN DIL 3.0X12MM -- NC EUPHORA

## (undated) DEVICE — DEVICE INFL W/ HEM VLV TORQ

## (undated) DEVICE — NDL FLTR TIP 5 MIC 18GX1.5IN --

## (undated) DEVICE — Device

## (undated) DEVICE — CATH IV SAFE STR 22GX1IN BLU -- PROTECTIV PLUS

## (undated) DEVICE — SPONGE GZ W4XL4IN RAYON POLY 4 PLY NONWOVEN FASTER WICKING

## (undated) DEVICE — KENDALL RADIOLUCENT FOAM MONITORING ELECTRODE RECTANGULAR SHAPE: Brand: KENDALL

## (undated) DEVICE — DRAPE,ANGIO,BRACH,STERILE,38X44: Brand: MEDLINE

## (undated) DEVICE — SNARE POLYP SM W13MMXL240CM SHTH DIA2.4MM OVL FLX DISP

## (undated) DEVICE — ANGIOGRAPHIC CATHETER: Brand: EXPO™

## (undated) DEVICE — TRNQT TEXT 1X18IN BLU LF DISP -- CONVERT TO ITEM 362165

## (undated) DEVICE — TUBING PRSS MON L24IN PVC RIG NONEXPANDING M TO FEM CONN

## (undated) DEVICE — CATH BLLN DIL 2.5 X12MM RX -- EUPHORA

## (undated) DEVICE — PACK PROCEDURE SURG CATH CUST

## (undated) DEVICE — GARMENT,MEDLINE,DVT,INT,CALF,MED, GEN2: Brand: MEDLINE

## (undated) DEVICE — CATH GUID COR EB30 6FR 100CM -- LAUNCHER

## (undated) DEVICE — SINGLE PORT MANIFOLD: Brand: NEPTUNE 2

## (undated) DEVICE — SYRINGE 50ML E/T

## (undated) DEVICE — SYR 5ML 1/5 GRAD LL NSAF LF --

## (undated) DEVICE — MAJ-1414 SINGLE USE ADPATER BIOPSY VALV: Brand: SINGLE USE ADAPTOR BIOPSY VALVE

## (undated) DEVICE — TRAP SPEC COLL POLYP POLYSTYR --

## (undated) DEVICE — PROCEDURE KIT FLUID MGMT 10 FR CUST MAINFOLD

## (undated) DEVICE — ERBE NESSY®PLATE 70 SPLIT; 72CM²; CABLE 3M: Brand: ERBE

## (undated) DEVICE — CATH ANGI BLLN DIL 3.5X15MM -- NC EUPHORA

## (undated) DEVICE — SPLINT WR POS F/ARTERIAL ACC -- BX/10

## (undated) DEVICE — GUIDEWIRE VASC L260CM DIA0.035IN RAD 3MM J TIP L7CM PTFE

## (undated) DEVICE — SYR 3ML LL TIP 1/10ML GRAD --

## (undated) DEVICE — CATH SUC CTRL PRT TRIFLO 14FR --

## (undated) DEVICE — SENSOR PLSE OXMTR AD CBL L36IN ADH FRM FIT SPO2 DISP

## (undated) DEVICE — STOPCOCK TRNSDUC 500PSI 3 W ROT M LUER LT BLU OFF HNDL R

## (undated) DEVICE — GLIDESHEATH SLENDER STAINLESS STEEL KIT: Brand: GLIDESHEATH SLENDER

## (undated) DEVICE — TUBING, SUCTION, 1/4" X 12', STRAIGHT: Brand: MEDLINE

## (undated) DEVICE — RUNTHROUGH NS EXTRA FLOPPY PTCA GUIDEWIRE: Brand: RUNTHROUGH

## (undated) DEVICE — WRISTBAND ID AD W2.5XL9.5CM RED VYN ADH CLSR UNI-PRINT

## (undated) DEVICE — CANNULA CUSH AD W/ 14FT TBG

## (undated) DEVICE — Z DUPLICATE USE 2103554 VALVE HEMOSTATIC BLEEDBK CTRL COPILOT

## (undated) DEVICE — SOLUTION IV 500ML 0.9% SOD CHL FLX CONT

## (undated) DEVICE — SET ADMIN 16ML TBNG L100IN 2 Y INJ SITE IV PIGGY BK DISP

## (undated) DEVICE — SPONGE GZ W4XL4IN COT 12 PLY TYP VII WVN C FLD DSGN

## (undated) DEVICE — PRESSURE MONITORING SET: Brand: TRUWAVE

## (undated) DEVICE — SHIELD RAD 14X16 IN W/ SCOOP ABSORBER